# Patient Record
Sex: FEMALE | Race: WHITE | Employment: OTHER | URBAN - METROPOLITAN AREA
[De-identification: names, ages, dates, MRNs, and addresses within clinical notes are randomized per-mention and may not be internally consistent; named-entity substitution may affect disease eponyms.]

---

## 2017-05-17 ENCOUNTER — APPOINTMENT (OUTPATIENT)
Dept: CT IMAGING | Facility: HOSPITAL | Age: 77
DRG: 444 | End: 2017-05-17
Attending: EMERGENCY MEDICINE
Payer: MEDICARE

## 2017-05-17 ENCOUNTER — HOSPITAL ENCOUNTER (INPATIENT)
Facility: HOSPITAL | Age: 77
LOS: 4 days | Discharge: HOME | DRG: 444 | End: 2017-05-22
Attending: EMERGENCY MEDICINE | Admitting: INTERNAL MEDICINE
Payer: MEDICARE

## 2017-05-17 ENCOUNTER — APPOINTMENT (OUTPATIENT)
Dept: ULTRASOUND IMAGING | Facility: HOSPITAL | Age: 77
DRG: 444 | End: 2017-05-17
Attending: EMERGENCY MEDICINE
Payer: MEDICARE

## 2017-05-17 DIAGNOSIS — K80.50 CHOLEDOCHOLITHIASIS: ICD-10-CM

## 2017-05-17 DIAGNOSIS — K85.10 ACUTE BILIARY PANCREATITIS, UNSPECIFIED COMPLICATION STATUS: Primary | ICD-10-CM

## 2017-05-17 PROCEDURE — 80048 BASIC METABOLIC PNL TOTAL CA: CPT | Performed by: EMERGENCY MEDICINE

## 2017-05-17 PROCEDURE — 80076 HEPATIC FUNCTION PANEL: CPT | Performed by: EMERGENCY MEDICINE

## 2017-05-17 PROCEDURE — 99285 EMERGENCY DEPT VISIT HI MDM: CPT

## 2017-05-17 PROCEDURE — 93005 ELECTROCARDIOGRAM TRACING: CPT

## 2017-05-17 PROCEDURE — 96376 TX/PRO/DX INJ SAME DRUG ADON: CPT

## 2017-05-17 PROCEDURE — 96365 THER/PROPH/DIAG IV INF INIT: CPT

## 2017-05-17 PROCEDURE — 86850 RBC ANTIBODY SCREEN: CPT | Performed by: EMERGENCY MEDICINE

## 2017-05-17 PROCEDURE — 83690 ASSAY OF LIPASE: CPT | Performed by: EMERGENCY MEDICINE

## 2017-05-17 PROCEDURE — 86900 BLOOD TYPING SEROLOGIC ABO: CPT | Performed by: EMERGENCY MEDICINE

## 2017-05-17 PROCEDURE — 74177 CT ABD & PELVIS W/CONTRAST: CPT | Performed by: EMERGENCY MEDICINE

## 2017-05-17 PROCEDURE — 86901 BLOOD TYPING SEROLOGIC RH(D): CPT | Performed by: EMERGENCY MEDICINE

## 2017-05-17 PROCEDURE — 96375 TX/PRO/DX INJ NEW DRUG ADDON: CPT

## 2017-05-17 PROCEDURE — 93010 ELECTROCARDIOGRAM REPORT: CPT | Performed by: EMERGENCY MEDICINE

## 2017-05-17 PROCEDURE — 76705 ECHO EXAM OF ABDOMEN: CPT | Performed by: EMERGENCY MEDICINE

## 2017-05-17 PROCEDURE — 85025 COMPLETE CBC W/AUTO DIFF WBC: CPT | Performed by: EMERGENCY MEDICINE

## 2017-05-17 PROCEDURE — 96361 HYDRATE IV INFUSION ADD-ON: CPT

## 2017-05-17 RX ORDER — SODIUM CHLORIDE, SODIUM LACTATE, POTASSIUM CHLORIDE, CALCIUM CHLORIDE 600; 310; 30; 20 MG/100ML; MG/100ML; MG/100ML; MG/100ML
INJECTION, SOLUTION INTRAVENOUS ONCE
Status: COMPLETED | OUTPATIENT
Start: 2017-05-17 | End: 2017-05-18

## 2017-05-17 RX ORDER — ONDANSETRON 2 MG/ML
4 INJECTION INTRAMUSCULAR; INTRAVENOUS ONCE
Status: COMPLETED | OUTPATIENT
Start: 2017-05-17 | End: 2017-05-17

## 2017-05-17 RX ORDER — HYDROMORPHONE HYDROCHLORIDE 1 MG/ML
0.5 INJECTION, SOLUTION INTRAMUSCULAR; INTRAVENOUS; SUBCUTANEOUS ONCE
Status: COMPLETED | OUTPATIENT
Start: 2017-05-17 | End: 2017-05-17

## 2017-05-17 RX ORDER — HYDROMORPHONE HYDROCHLORIDE 1 MG/ML
0.5 INJECTION, SOLUTION INTRAMUSCULAR; INTRAVENOUS; SUBCUTANEOUS EVERY 30 MIN PRN
Status: DISCONTINUED | OUTPATIENT
Start: 2017-05-17 | End: 2017-05-18

## 2017-05-17 RX ORDER — MORPHINE SULFATE 4 MG/ML
4 INJECTION, SOLUTION INTRAMUSCULAR; INTRAVENOUS ONCE
Status: COMPLETED | OUTPATIENT
Start: 2017-05-17 | End: 2017-05-17

## 2017-05-18 PROBLEM — K85.10 ACUTE BILIARY PANCREATITIS, UNSPECIFIED COMPLICATION STATUS: Status: ACTIVE | Noted: 2017-05-18

## 2017-05-18 PROBLEM — K85.10 ACUTE BILIARY PANCREATITIS, UNSPECIFIED COMPLICATION STATUS (HCC): Status: ACTIVE | Noted: 2017-05-18

## 2017-05-18 PROCEDURE — 82150 ASSAY OF AMYLASE: CPT | Performed by: INTERNAL MEDICINE

## 2017-05-18 PROCEDURE — C9113 INJ PANTOPRAZOLE SODIUM, VIA: HCPCS | Performed by: INTERNAL MEDICINE

## 2017-05-18 PROCEDURE — 85025 COMPLETE CBC W/AUTO DIFF WBC: CPT | Performed by: INTERNAL MEDICINE

## 2017-05-18 PROCEDURE — 80053 COMPREHEN METABOLIC PANEL: CPT | Performed by: INTERNAL MEDICINE

## 2017-05-18 PROCEDURE — 83690 ASSAY OF LIPASE: CPT | Performed by: INTERNAL MEDICINE

## 2017-05-18 RX ORDER — CLONAZEPAM 2 MG/1
1.5 TABLET ORAL NIGHTLY
COMMUNITY
End: 2018-08-07

## 2017-05-18 RX ORDER — ROSUVASTATIN CALCIUM 10 MG/1
10 TABLET, COATED ORAL DAILY
COMMUNITY
End: 2018-08-01

## 2017-05-18 RX ORDER — ONDANSETRON 2 MG/ML
4 INJECTION INTRAMUSCULAR; INTRAVENOUS ONCE
Status: COMPLETED | OUTPATIENT
Start: 2017-05-18 | End: 2017-05-18

## 2017-05-18 RX ORDER — HYDROMORPHONE HYDROCHLORIDE 1 MG/ML
0.5 INJECTION, SOLUTION INTRAMUSCULAR; INTRAVENOUS; SUBCUTANEOUS EVERY 6 HOURS PRN
Status: DISCONTINUED | OUTPATIENT
Start: 2017-05-18 | End: 2017-05-20

## 2017-05-18 RX ORDER — MULTIVIT-MIN/IRON/FOLIC ACID/K 18-600-40
2000 CAPSULE ORAL
COMMUNITY
End: 2017-06-27

## 2017-05-18 RX ORDER — DEXTROSE, SODIUM CHLORIDE, AND POTASSIUM CHLORIDE 5; .45; .15 G/100ML; G/100ML; G/100ML
INJECTION INTRAVENOUS CONTINUOUS
Status: DISCONTINUED | OUTPATIENT
Start: 2017-05-18 | End: 2017-05-20

## 2017-05-18 RX ORDER — HYDROMORPHONE HYDROCHLORIDE 1 MG/ML
0.5 INJECTION, SOLUTION INTRAMUSCULAR; INTRAVENOUS; SUBCUTANEOUS EVERY 4 HOURS PRN
Status: DISCONTINUED | OUTPATIENT
Start: 2017-05-18 | End: 2017-05-22

## 2017-05-18 RX ORDER — 0.9 % SODIUM CHLORIDE 0.9 %
VIAL (ML) INJECTION
Status: COMPLETED
Start: 2017-05-18 | End: 2017-05-18

## 2017-05-18 RX ORDER — HYDROMORPHONE HYDROCHLORIDE 1 MG/ML
1 INJECTION, SOLUTION INTRAMUSCULAR; INTRAVENOUS; SUBCUTANEOUS EVERY 4 HOURS PRN
Status: DISCONTINUED | OUTPATIENT
Start: 2017-05-18 | End: 2017-05-22

## 2017-05-18 RX ORDER — ONDANSETRON 2 MG/ML
4 INJECTION INTRAMUSCULAR; INTRAVENOUS EVERY 6 HOURS PRN
Status: DISCONTINUED | OUTPATIENT
Start: 2017-05-18 | End: 2017-05-22

## 2017-05-18 NOTE — CONSULTS
[facility]    Report of Consultation    Sona Ash Patient Status:  Inpatient    1940 MRN [id number]   Location [facility] 5SW/SE Attending Austin Mckeon, *   Hosp Day # 1 PCP Judson Webster MD     Date of negative  Genitourinary: negative  Musculoskeletal: negative  Neurological: negative  Behavioral/Psych: negative    Physical Exam:   Blood pressure 107/49, pulse 91, temperature 98.1 °F (36.7 °C), temperature source Oral, resp.  rate 18, height 5' 3\" (1.6 there is agreement without major discrepancies. Ct Abdomen Pelvis Iv Contrast, No Oral (er)    5/18/2017  CONCLUSION:  1. Acute pancreatitis. 2. Mild intra-and extrahepatic biliary ductal dilatation without evidence of choledocholithiasis.  3. 1.3 c

## 2017-05-18 NOTE — PROGRESS NOTES
05/18/17 1318   Clinical Encounter Type   Visited With Patient   Routine Visit Introduction   Continue Visiting Yes   Referral From Nurse   Referral To [address] Needs Prayer   Patient Spiritual Encounters   Spiritual A

## 2017-05-18 NOTE — PLAN OF CARE
Problem: Patient/Family Goals  Goal: Patient/Family Long Term Goal  Patient’s Long Term Goal: Go home     Interventions:  - medical intervention  - monitor labs  - medicate for pain and nausea  - advance diet when medically stable   - hydrate  - administer hydration with IV or PO as ordered and tolerated  - Evaluate effectiveness of GI medications  - Encourage mobilization and activity  - Obtain nutritional consult as needed  - Establish a toileting routine/schedule  - Consider collaborating with pharmacy to

## 2017-05-18 NOTE — CM/SW NOTE
Spoke with patient at bedside. Patient states she is home alone and her  is at [facility] for rehab.  Patient very concerned regarding the rehab facility will be discharging the patient  tomorrow and no one will be home because she

## 2017-05-18 NOTE — ED NOTES
Left upper arm and left shoulder healing bruise noted. Pt denies fall or trauma to arm. Pt states she has shown this to her PCP. Pt vomited approximately 300 cc emesis. No visual blood noted in emesis.

## 2017-05-18 NOTE — ED NOTES
Report given to Counts include 234 beds at the [facility], awaiting for room to be clean, pt and family updated.

## 2017-05-18 NOTE — ED NOTES
Pt to ER with c/o sudden onset upper mid abdominal pain. Pt states she also vomited x1 when pain started. Pt noticed mucous with streaks of blood and small blood clot in emesis. Pt vomited one other time on the way to ER. Pt c/o pain to abdomen 8/10.  Pt pa

## 2017-05-18 NOTE — ED INITIAL ASSESSMENT (HPI)
Patient c/o 1 episode vomiting blood around 6 pm----patient stated it was a large clot with mucous. C/o generalized abdominal cramping    Patient takes ASA PRN for pain, denies any other anticoagulants.  Denies rectal bleeding    Appears pale, weak

## 2017-05-18 NOTE — ED PROVIDER NOTES
Patient Seen in: [facility] AND [facility] Emergency Department    History   Patient presents with:  Hemoptysis (respiratory)    Stated Complaint: vomitting blood     HPI    26-year-old female presents for sudden onset epigastric abdominal pain with nonbilious b (5' 3\")  Wt 54.885 kg  BMI 21.44 kg/m2  SpO2 100%        Physical Exam   Constitutional: She is oriented to person, place, and time. She appears well-developed and well-nourished. HENT:   Head: Normocephalic and atraumatic.    Eyes: EOM are normal. Pupil -----------         ------                     CBC W/ DIFFERENTIAL[[id number]]          Abnormal            Final result                 Please view results for these tests on the individual orders.    URINALYSIS WITH CULTURE REFLEX   TYPE AND SCRE minimal wall prominence; small amount of pericholecystic fluid; few anechoic liver lesions are likely cysts      Case discussed with  Dr. Harpreet Anthony @ 12:45 am ET      CT ABDOMEN PELVIS WITH CONTRAST      IMPRESSION:  Extensive peripancreatic fluid and stra

## 2017-05-19 ENCOUNTER — APPOINTMENT (OUTPATIENT)
Dept: GENERAL RADIOLOGY | Facility: HOSPITAL | Age: 77
DRG: 444 | End: 2017-05-19
Attending: INTERNAL MEDICINE
Payer: MEDICARE

## 2017-05-19 PROCEDURE — 71020 XR CHEST PA + LAT CHEST (CPT=71020): CPT | Performed by: INTERNAL MEDICINE

## 2017-05-19 PROCEDURE — C9113 INJ PANTOPRAZOLE SODIUM, VIA: HCPCS | Performed by: INTERNAL MEDICINE

## 2017-05-19 PROCEDURE — 73030 X-RAY EXAM OF SHOULDER: CPT | Performed by: INTERNAL MEDICINE

## 2017-05-19 NOTE — PROGRESS NOTES
[facility]    Progress Note    Sona Padilla Patient Status:  Inpatient    1940 MRN [id number]   Location [facility] 5SW/SE Attending Trae Guerrero, [address] Day # 2 PCP Amber Angel MD       Subjective: NEED CHOLECYSTECTOMY AT SOME PT. PT SAYS SHE IS NOT READY FOR CHOLECYSTECTOMY AT PRESENT DUE TO HER 'S CONDITION.   DW PT IN DETAILS      Results:     Lab Results  Component Value Date   WBC 12.3* 05/18/2017   HGB 10.5* 05/18/2017   HCT 31.6* 05/18/2

## 2017-05-19 NOTE — PLAN OF CARE
Problem: Patient/Family Goals  Goal: Patient/Family Long Term Goal  Patient’s Long Term Goal: Go home     Interventions:  - medical intervention  - monitor labs  - medicate for pain and nausea  - advance diet when medically stable   - hydrate  - administer antiemetic medications  - Provide nonpharmacologic comfort measures as appropriate  - Advance diet as tolerated, if ordered  - Obtain nutritional consult as needed  - Evaluate fluid balance   Outcome: Progressing  Continue IV hydration.  patient to be on cl care of him     Problem: SAFETY ADULT - FALL  Goal: Free from fall injury  INTERVENTIONS:  - Assess pt frequently for physical needs  - Identify cognitive and physical deficits and behaviors that affect risk of falls.   - [place] fall precautions as indic

## 2017-05-19 NOTE — H&P
[facility]    PATIENT'S NAME: Red Womacktan   ATTENDING PHYSICIAN: León Cho MD   PATIENT ACCOUNT#:   [id number]    LOCATION:  [address] [place] [address] RECORD #:   [id number]       YOB: 1940  ADMISSION DATE:       05/ with acute abdominal pain from pancreatitis. The patient has choledocholithiasis. She is on IV fluids. She had mild intrahepatic and extrahepatic biliary duct dilatation, a 1.3 cm calculus in the right renal pelvis.   Actually, the CT scan revealed no cho

## 2017-05-20 ENCOUNTER — ANESTHESIA (OUTPATIENT)
Dept: ENDOSCOPY | Facility: HOSPITAL | Age: 77
DRG: 444 | End: 2017-05-20
Payer: MEDICARE

## 2017-05-20 ENCOUNTER — APPOINTMENT (OUTPATIENT)
Dept: GENERAL RADIOLOGY | Facility: HOSPITAL | Age: 77
DRG: 444 | End: 2017-05-20
Attending: INTERNAL MEDICINE
Payer: MEDICARE

## 2017-05-20 ENCOUNTER — ANESTHESIA EVENT (OUTPATIENT)
Dept: ENDOSCOPY | Facility: HOSPITAL | Age: 77
DRG: 444 | End: 2017-05-20
Payer: MEDICARE

## 2017-05-20 ENCOUNTER — SURGERY (OUTPATIENT)
Age: 77
End: 2017-05-20

## 2017-05-20 PROCEDURE — C9113 INJ PANTOPRAZOLE SODIUM, VIA: HCPCS | Performed by: INTERNAL MEDICINE

## 2017-05-20 PROCEDURE — 0FC98ZZ EXTIRPATION OF MATTER FROM COMMON BILE DUCT, VIA NATURAL OR ARTIFICIAL OPENING ENDOSCOPIC: ICD-10-PCS | Performed by: INTERNAL MEDICINE

## 2017-05-20 PROCEDURE — 97165 OT EVAL LOW COMPLEX 30 MIN: CPT

## 2017-05-20 PROCEDURE — 97116 GAIT TRAINING THERAPY: CPT

## 2017-05-20 PROCEDURE — BF131ZZ FLUOROSCOPY OF GALLBLADDER AND BILE DUCTS USING LOW OSMOLAR CONTRAST: ICD-10-PCS | Performed by: INTERNAL MEDICINE

## 2017-05-20 PROCEDURE — 0BH17EZ INSERTION OF ENDOTRACHEAL AIRWAY INTO TRACHEA, VIA NATURAL OR ARTIFICIAL OPENING: ICD-10-PCS | Performed by: INTERNAL MEDICINE

## 2017-05-20 PROCEDURE — 97162 PT EVAL MOD COMPLEX 30 MIN: CPT

## 2017-05-20 PROCEDURE — 74330 X-RAY BILE/PANC ENDOSCOPY: CPT | Performed by: INTERNAL MEDICINE

## 2017-05-20 PROCEDURE — 0F794DZ DILATION OF COMMON BILE DUCT WITH INTRALUMINAL DEVICE, PERCUTANEOUS ENDOSCOPIC APPROACH: ICD-10-PCS | Performed by: INTERNAL MEDICINE

## 2017-05-20 DEVICE — BILIARY STENT WITH NAVIFLEXTM RX DELIVERY SYSTEM
Type: IMPLANTABLE DEVICE | Status: FUNCTIONAL
Brand: ADVANIX™ BILIARY

## 2017-05-20 RX ORDER — SODIUM CHLORIDE, SODIUM LACTATE, POTASSIUM CHLORIDE, CALCIUM CHLORIDE 600; 310; 30; 20 MG/100ML; MG/100ML; MG/100ML; MG/100ML
INJECTION, SOLUTION INTRAVENOUS CONTINUOUS PRN
Status: DISCONTINUED | OUTPATIENT
Start: 2017-05-20 | End: 2017-05-20 | Stop reason: SURG

## 2017-05-20 RX ORDER — 0.9 % SODIUM CHLORIDE 0.9 %
VIAL (ML) INJECTION
Status: COMPLETED
Start: 2017-05-20 | End: 2017-05-20

## 2017-05-20 RX ORDER — SODIUM CHLORIDE, SODIUM LACTATE, POTASSIUM CHLORIDE, CALCIUM CHLORIDE 600; 310; 30; 20 MG/100ML; MG/100ML; MG/100ML; MG/100ML
INJECTION, SOLUTION INTRAVENOUS CONTINUOUS
Status: DISCONTINUED | OUTPATIENT
Start: 2017-05-20 | End: 2017-05-22

## 2017-05-20 RX ORDER — ROCURONIUM BROMIDE 10 MG/ML
INJECTION, SOLUTION INTRAVENOUS AS NEEDED
Status: DISCONTINUED | OUTPATIENT
Start: 2017-05-20 | End: 2017-05-20 | Stop reason: SURG

## 2017-05-20 RX ORDER — ONDANSETRON 2 MG/ML
4 INJECTION INTRAMUSCULAR; INTRAVENOUS ONCE AS NEEDED
Status: DISCONTINUED | OUTPATIENT
Start: 2017-05-20 | End: 2017-05-20 | Stop reason: HOSPADM

## 2017-05-20 RX ORDER — NEOSTIGMINE METHYLSULFATE 0.5 MG/ML
INJECTION INTRAVENOUS AS NEEDED
Status: DISCONTINUED | OUTPATIENT
Start: 2017-05-20 | End: 2017-05-20 | Stop reason: SURG

## 2017-05-20 RX ORDER — GLYCOPYRROLATE 0.2 MG/ML
INJECTION INTRAMUSCULAR; INTRAVENOUS AS NEEDED
Status: DISCONTINUED | OUTPATIENT
Start: 2017-05-20 | End: 2017-05-20 | Stop reason: SURG

## 2017-05-20 RX ADMIN — GLYCOPYRROLATE 0.2 MG: 0.2 INJECTION INTRAMUSCULAR; INTRAVENOUS at 17:38:00

## 2017-05-20 RX ADMIN — SODIUM CHLORIDE, SODIUM LACTATE, POTASSIUM CHLORIDE, CALCIUM CHLORIDE: 600; 310; 30; 20 INJECTION, SOLUTION INTRAVENOUS at 17:16:00

## 2017-05-20 RX ADMIN — SODIUM CHLORIDE, SODIUM LACTATE, POTASSIUM CHLORIDE, CALCIUM CHLORIDE: 600; 310; 30; 20 INJECTION, SOLUTION INTRAVENOUS at 17:19:00

## 2017-05-20 RX ADMIN — NEOSTIGMINE METHYLSULFATE 1 MG: 0.5 INJECTION INTRAVENOUS at 17:38:00

## 2017-05-20 RX ADMIN — SODIUM CHLORIDE, SODIUM LACTATE, POTASSIUM CHLORIDE, CALCIUM CHLORIDE: 600; 310; 30; 20 INJECTION, SOLUTION INTRAVENOUS at 16:55:00

## 2017-05-20 RX ADMIN — SODIUM CHLORIDE, SODIUM LACTATE, POTASSIUM CHLORIDE, CALCIUM CHLORIDE: 600; 310; 30; 20 INJECTION, SOLUTION INTRAVENOUS at 17:57:00

## 2017-05-20 RX ADMIN — SODIUM CHLORIDE, SODIUM LACTATE, POTASSIUM CHLORIDE, CALCIUM CHLORIDE: 600; 310; 30; 20 INJECTION, SOLUTION INTRAVENOUS at 17:32:00

## 2017-05-20 RX ADMIN — ROCURONIUM BROMIDE 30 MG: 10 INJECTION, SOLUTION INTRAVENOUS at 17:04:00

## 2017-05-20 NOTE — PROGRESS NOTES
05/19/17 2006   Clinical Encounter Type   Visited With Patient   Routine Visit Follow-up   Continue Visiting Yes   Surgical Visit Pre-op   Patient Spiritual Encounters   Spiritual Assessment Completed 1   Spiritual Needs Empathic listening and calming p

## 2017-05-20 NOTE — OPERATIVE REPORT
ERCP:    PERIAMPULLARY DIVERTICULUM  NORMAL PD  DILATED CBD  ES DONE  MULTIPLE STONES REMOVED WITH SLUDGE  10 FR 7 CM STENT INSERTED    PLAN:    PT WANTS TO HOLD OFF ALIA. FOR NOW. CLEAR LIQUID DIET  ADVANCE AS TOLERATED.   WILL REMOVE STENT AFTER ALIA

## 2017-05-20 NOTE — PLAN OF CARE
Problem: Patient/Family Goals  Goal: Patient/Family Short Term Goal  Patient’s Short Term Goal: Alleviate pain and nausea    Interventions:   - Administer pain medications and antiemetics as prescribe  - keep NPO as ordered  - Evaluate effectiveness of med decision-making at the level they choose  - Honor patient and family perspectives and choices   Outcome: Progressing    Problem: SAFETY ADULT - FALL  Goal: Free from fall injury  INTERVENTIONS:  - Assess pt frequently for physical needs  - Identify cogniti

## 2017-05-20 NOTE — ANESTHESIA PREPROCEDURE EVALUATION
Anesthesia PreOp Note    HPI:     Sona Mora is a 64year old female who presents for preoperative consultation requested by: Tonio Moya MD    Date of Surgery: 5/17/2017 - 5/20/2017    Procedure(s):  ENDOSCOPIC RETROGRADE CHOLANGIOPANCREATOGRAPHY Q4H PRN Chris Gabriel MD 1 mg at 05/20/17 0706   ClonazePAM (KLONOPIN) tab 1.5 mg 1.5 mg Oral Nightly Benny Flores MD 1.5 mg at 05/19/17 2118     No current Epic-ordered outpatient prescriptions on file.       Morphine                    Histo anesthetic complications   Airway   Mallampati: III  TM distance: >3 FB  Neck ROM: limited  Dental      Pulmonary     breath sounds clear to auscultation  (-) asthma  Cardiovascular   (-) hypertension, past MI, CAD, CABG/stent, angina    Rhythm: regular  R

## 2017-05-20 NOTE — OCCUPATIONAL THERAPY NOTE
OCCUPATIONAL THERAPY EVALUATION - INPATIENT     Room Number: 551/551-A  Evaluation Date: 5/20/2017  Type of Evaluation: Initial  Presenting Problem: pancreatitis    Physician Order: IP Consult to Occupational Therapy  Reason for Therapy: ADL/IADL Dysfuncti independent with ADLs and ambulation s/ AD. Pt's son (who is on disability) lives in the apartment attached to their home.  is currently in rehab. Pt works part time at [employer]. She drives.      SUBJECTIVE  \"I need to be okay because my  is serna ASSESSMENT  Grooming: supervision  Bathing: supervision  Toileting: supervision  Upper Extremity Dressing: supervision  Lower Extremity Dressing:  Mod I      Education Provided: role of OT   Patient End of Session: Up in chair;Needs met;Call light within re

## 2017-05-20 NOTE — PROGRESS NOTES
[facility]    Progress Note    Sona LANDIN Seb Burgos Patient Status:  Inpatient    1940 MRN [id number]   Location [facility] 5SW/SE Attending Marty Aguirre, [address] Day # 2 PCP Addi Sifuentes MD     Subjective: 05/18/2017   LIP 1945* 05/18/2017       Xr Chest Pa + Lat Chest (cpt=71020)    5/19/2017  CONCLUSION:  1. Bibasilar pleural effusions, a new finding. 2.    Associated bibasilar interstitial infiltrates/subsegmental atelectasis.         Xr Shoulder, Complete

## 2017-05-20 NOTE — PHYSICAL THERAPY NOTE
PHYSICAL THERAPY EVALUATION - INPATIENT     Room Number: 551/551-A  Evaluation Date: 5/20/2017  Type of Evaluation: Initial  Physician Order: PT Eval and Treat    Presenting Problem: Pancreatitis  Reason for Therapy: Mobility Dysfunction and Discharge Level of [place]: Fully independent; pt works part time at [employer]    SUBJECTIVE  I need to get better soon    PHYSICAL THERAPY EXAMINATION     OBJECTIVE  Precautions: Bed/chair alarm  Fall Risk: Standard fall risk    WEIGHT BEARING RESTRICTION  Weight Assessment   Gait Assistance: Supervision  Distance (ft): 400  Assistive Device: Other (Comment) (IV pole)  Pattern: Shuffle  Stoop/Curb Assistance: Not tested       Bed Mobility: SBA    Transfers: SBA    Exercise/Education Provided:  Bed mobility  Gait tr

## 2017-05-20 NOTE — ANESTHESIA POSTPROCEDURE EVALUATION
Patient: June E Nelsy Ali    Procedure Summary     Date Anesthesia Start Anesthesia Stop Room / Location    05/20/17 2436  [facility] ENDOSCOPY 01 / [facility] ENDOSCOPY       Procedure Diagnosis Surgeon Responsible Provider    ENDOSCOPIC RETROGRADE CHOLANGIOPANCREATOGRAP

## 2017-05-20 NOTE — PROGRESS NOTES
Patient returned from ERCP in stable condition. Alert and denying any pain or nausea at this time, IV fluids running 125hr, dinner tray has been ordered for clear diet. /74, HR 71, Temp 97.6, O2 sat 96% on RA.

## 2017-05-21 PROCEDURE — C9113 INJ PANTOPRAZOLE SODIUM, VIA: HCPCS | Performed by: INTERNAL MEDICINE

## 2017-05-21 PROCEDURE — 83690 ASSAY OF LIPASE: CPT | Performed by: INTERNAL MEDICINE

## 2017-05-21 PROCEDURE — 80053 COMPREHEN METABOLIC PANEL: CPT | Performed by: INTERNAL MEDICINE

## 2017-05-21 PROCEDURE — 85025 COMPLETE CBC W/AUTO DIFF WBC: CPT | Performed by: INTERNAL MEDICINE

## 2017-05-21 RX ORDER — 0.9 % SODIUM CHLORIDE 0.9 %
VIAL (ML) INJECTION
Status: COMPLETED
Start: 2017-05-21 | End: 2017-05-21

## 2017-05-21 NOTE — OPERATIVE REPORT
[facility]    PATIENT'S NAME: BERNIE LOMELI   ATTENDING PHYSICIAN: Samir Navas MD   OPERATING PHYSICIAN: Santi Coronado MD   PATIENT ACCOUNT#:   [id number]    LOCATION:  [address] [place] [address],[address] #:   [id number]       DATE OF BI tolerated the procedure well. There were no preoperative, intraoperative, or postoperative complications. PLAN:    1. Clear liquid diet. Advance tomorrow if no pain. 2.   Followup labs.   3.   The patient wants to hold off on laparoscopic cholecys

## 2017-05-21 NOTE — PROGRESS NOTES
[facility]    Progress Note    Sona Royjose franciscomando Ramirofrank Patient Status:  Inpatient    1940 MRN [id number]   Location [facility] 5SW/SE Attending Renato Lane, *   Hosp Day # 4 PCP Сергей Bhatti MD     Subjective: 05/21/2017   AST 29 05/21/2017   ALT 59* 05/21/2017   EMELYN 1992* 05/18/2017   LIP 89* 05/21/2017       Xr Chest Pa + Lat Chest (cpt=71020)    5/19/2017  CONCLUSION:  1. Bibasilar pleural effusions, a new finding.  2.    Associated bibasilar interstitial infi

## 2017-05-21 NOTE — PROGRESS NOTES
[facility]    Progress Note    Sona LANDIN Jignesh Chaitanya Patient Status:  Inpatient    1940 MRN [id number]   Location [facility] 5SW/SE Attending Warden Orourke, *   Hosp Day # 4 PCP Karlee Robb MD       Subjective: 05/21/2017    05/21/2017   CO2 25 05/21/2017   GLU 99 05/21/2017   CA 9.2 05/21/2017   ALB 2.9* 05/21/2017   ALKPHO 75 05/21/2017   BILT 0.8 05/21/2017   TP 6.2 05/21/2017   AST 29 05/21/2017   ALT 59* 05/21/2017   EMELYN 1992* 05/18/2017   LIP 89* 05/2

## 2017-05-21 NOTE — PROGRESS NOTES
[place] [facility]    Progress Note    Sona MUSHTAQ Liban Mcdonaldkunal Patient Status:  Inpatient    1940 MRN [id number]   Location [address] UNIT Attending Elyssa Lozano [facility] Day # 3 PCP Miley Lorenzana MD 05/18/2017    05/18/2017   CO2 25 05/18/2017   * 05/18/2017   CA 8.7 05/18/2017   ALB 3.3* 05/18/2017   ALKPHO 77 05/18/2017   BILT 0.6 05/18/2017   TP 5.9 05/18/2017   * 05/18/2017   * 05/18/2017   EMELYN 1992* 05/18/2017

## 2017-05-21 NOTE — PLAN OF CARE
Problem: Patient/Family Goals  Goal: Patient/Family Short Term Goal  Patient’s Short Term Goal: Alleviate pain and nausea    Interventions:   - Administer pain medications and antiemetics as prescribe  - keep NPO as ordered  - Evaluate effectiveness of med Centered Care  Goal: Patient preferences are identified and integrated in the patient’s plan of care  Interventions:  - What would you like us to know as we care for you?   - Provide timely, complete, and accurate information to patient/family  - Branden Quach

## 2017-05-22 VITALS
SYSTOLIC BLOOD PRESSURE: 145 MMHG | HEART RATE: 76 BPM | RESPIRATION RATE: 18 BRPM | WEIGHT: 121 LBS | DIASTOLIC BLOOD PRESSURE: 68 MMHG | OXYGEN SATURATION: 97 % | TEMPERATURE: 98 F | HEIGHT: 63 IN | BODY MASS INDEX: 21.44 KG/M2

## 2017-05-22 PROCEDURE — C9113 INJ PANTOPRAZOLE SODIUM, VIA: HCPCS | Performed by: INTERNAL MEDICINE

## 2017-05-22 PROCEDURE — 97110 THERAPEUTIC EXERCISES: CPT

## 2017-05-22 PROCEDURE — 97530 THERAPEUTIC ACTIVITIES: CPT

## 2017-05-22 PROCEDURE — 97116 GAIT TRAINING THERAPY: CPT

## 2017-05-22 NOTE — PLAN OF CARE
Problem: Patient/Family Goals  Goal: Patient/Family Long Term Goal  Patient’s Long Term Goal: Go home     Interventions:  - medical intervention  - monitor labs  - medicate for pain and nausea  - advance diet when medically stable   - hydrate  - administer Progressing  Goal: Maintains or returns to baseline bowel function  INTERVENTIONS:  - Assess bowel function  - Maintain adequate hydration with IV or PO as ordered and tolerated  - Evaluate effectiveness of GI medications  - Encourage mobilization and acti

## 2017-05-22 NOTE — PLAN OF CARE
Problem: Patient/Family Goals  Goal: Patient/Family Long Term Goal  Patient’s Long Term Goal: Go home     Interventions:  - medical intervention  - monitor labs  - medicate for pain and nausea  - advance diet when medically stable   - hydrate  - administer tolerated, if ordered  - Obtain nutritional consult as needed  - Evaluate fluid balance   Outcome: Progressing  Patient denies nausea  Goal: Maintains or returns to baseline bowel function  INTERVENTIONS:  - Assess bowel function  - Maintain adequate hydra cognitive and physical deficits and behaviors that affect risk of falls.   - [place] fall precautions as indicated by assessment.  - Educate pt/family on patient safety including physical limitations  - Instruct pt to call for assistance with activity bas

## 2017-05-22 NOTE — PHYSICAL THERAPY NOTE
PHYSICAL THERAPY TREATMENT NOTE - INPATIENT    Room Number: 551/551-A       Presenting Problem: Pancreatitis    Problem List  Principal Problem:    Choledocholithiasis  Active Problems:    Acute biliary pancreatitis, unspecified complication status      A PT Approx Degree of Impairment Score: 20.91%   Standardized Score (AM-PAC Scale): 53.28   CMS Modifier (G-Code): CJ    FUNCTIONAL ABILITY STATUS  Gait Assessment   Gait Assistance: Supervision  Distance (ft): 2 x 200  Assistive Device: Other (Comment); No

## 2017-05-24 NOTE — DISCHARGE SUMMARY
[facility]    PATIENT'S NAME: Kelly Ellen   ATTENDING PHYSICIAN: Samir Navas MD   PATIENT ACCOUNT#:   [id number]    LOCATION:  [address] [place] [address] RECORD #:   [id number]       YOB: 1940  ADMISSION DATE:       05/

## 2017-05-31 ENCOUNTER — LAB ENCOUNTER (OUTPATIENT)
Dept: LAB | Facility: HOSPITAL | Age: 77
End: 2017-05-31
Attending: INTERNAL MEDICINE
Payer: MEDICARE

## 2017-05-31 DIAGNOSIS — K80.00 CALCULUS OF GALLBLADDER WITH ACUTE CHOLECYSTITIS: Primary | ICD-10-CM

## 2017-05-31 PROCEDURE — 80053 COMPREHEN METABOLIC PANEL: CPT

## 2017-05-31 PROCEDURE — 85025 COMPLETE CBC W/AUTO DIFF WBC: CPT

## 2017-05-31 PROCEDURE — 36415 COLL VENOUS BLD VENIPUNCTURE: CPT

## 2017-06-02 ENCOUNTER — HOSPITAL ENCOUNTER (OUTPATIENT)
Dept: MRI IMAGING | Age: 77
Discharge: HOME | End: 2017-06-02
Payer: MEDICARE

## 2017-06-02 DIAGNOSIS — M75.102 ROTATOR CUFF TEAR, LEFT: ICD-10-CM

## 2017-06-02 PROCEDURE — 73221 MRI JOINT UPR EXTREM W/O DYE: CPT | Performed by: ORTHOPAEDIC SURGERY

## 2017-06-02 PROCEDURE — 73221 MRI JOINT UPR EXTREM W/O DYE: CPT

## 2017-06-30 ENCOUNTER — HOSPITAL ENCOUNTER (INPATIENT)
Facility: HOSPITAL | Age: 77
LOS: 2 days | Discharge: HOME | DRG: 419 | End: 2017-07-02
Attending: SURGERY | Admitting: SURGERY
Payer: MEDICARE

## 2017-06-30 ENCOUNTER — ANESTHESIA EVENT (OUTPATIENT)
Dept: SURGERY | Facility: HOSPITAL | Age: 77
DRG: 419 | End: 2017-06-30
Payer: MEDICARE

## 2017-06-30 ENCOUNTER — ANESTHESIA (OUTPATIENT)
Dept: SURGERY | Facility: HOSPITAL | Age: 77
DRG: 419 | End: 2017-06-30
Payer: MEDICARE

## 2017-06-30 ENCOUNTER — SURGERY (OUTPATIENT)
Age: 77
End: 2017-06-30

## 2017-06-30 DIAGNOSIS — K80.10 CHRONIC CHOLECYSTITIS WITH CALCULUS: Primary | ICD-10-CM

## 2017-06-30 LAB
ALBUMIN SERPL BCP-MCNC: 3 G/DL (ref 3.5–4.8)
ALBUMIN/GLOB SERPL: 1 {RATIO} (ref 1–2)
ALP SERPL-CCNC: 64 U/L (ref 32–100)
ALT SERPL-CCNC: 20 U/L (ref 14–54)
ANION GAP SERPL CALC-SCNC: 7 MMOL/L (ref 0–18)
AST SERPL-CCNC: 34 U/L (ref 15–41)
BASOPHILS # BLD: 0 K/UL (ref 0–0.2)
BASOPHILS NFR BLD: 0 %
BILIRUB SERPL-MCNC: 0.3 MG/DL (ref 0.3–1.2)
BUN SERPL-MCNC: 19 MG/DL (ref 8–20)
BUN/CREAT SERPL: 25.7 (ref 10–20)
CALCIUM SERPL-MCNC: 8.8 MG/DL (ref 8.5–10.5)
CHLORIDE SERPL-SCNC: 108 MMOL/L (ref 95–110)
CO2 SERPL-SCNC: 22 MMOL/L (ref 22–32)
CREAT SERPL-MCNC: 0.74 MG/DL (ref 0.5–1.5)
EOSINOPHIL # BLD: 0 K/UL (ref 0–0.7)
EOSINOPHIL NFR BLD: 0 %
ERYTHROCYTE [DISTWIDTH] IN BLOOD BY AUTOMATED COUNT: 13.8 % (ref 11–15)
GLOBULIN PLAS-MCNC: 2.9 G/DL (ref 2.5–3.7)
GLUCOSE SERPL-MCNC: 153 MG/DL (ref 70–99)
HCT VFR BLD AUTO: 30.1 % (ref 35–48)
HGB BLD-MCNC: 10.2 G/DL (ref 12–16)
LYMPHOCYTES # BLD: 0.8 K/UL (ref 1–4)
LYMPHOCYTES NFR BLD: 10 %
MCH RBC QN AUTO: 29.2 PG (ref 27–32)
MCHC RBC AUTO-ENTMCNC: 33.9 G/DL (ref 32–37)
MCV RBC AUTO: 86.3 FL (ref 80–100)
MONOCYTES # BLD: 0.3 K/UL (ref 0–1)
MONOCYTES NFR BLD: 3 %
NEUTROPHILS # BLD AUTO: 6.7 K/UL (ref 1.8–7.7)
NEUTROPHILS NFR BLD: 86 %
OSMOLALITY UR CALC.SUM OF ELEC: 289 MOSM/KG (ref 275–295)
PLATELET # BLD AUTO: 178 K/UL (ref 140–400)
PMV BLD AUTO: 8.5 FL (ref 7.4–10.3)
POTASSIUM SERPL-SCNC: 4.5 MMOL/L (ref 3.3–5.1)
PROT SERPL-MCNC: 5.9 G/DL (ref 5.9–8.4)
RBC # BLD AUTO: 3.49 M/UL (ref 3.7–5.4)
SODIUM SERPL-SCNC: 137 MMOL/L (ref 136–144)
WBC # BLD AUTO: 7.8 K/UL (ref 4–11)

## 2017-06-30 PROCEDURE — 88304 TISSUE EXAM BY PATHOLOGIST: CPT | Performed by: SURGERY

## 2017-06-30 PROCEDURE — 80053 COMPREHEN METABOLIC PANEL: CPT | Performed by: INTERNAL MEDICINE

## 2017-06-30 PROCEDURE — 0FT44ZZ RESECTION OF GALLBLADDER, PERCUTANEOUS ENDOSCOPIC APPROACH: ICD-10-PCS | Performed by: SURGERY

## 2017-06-30 PROCEDURE — 85025 COMPLETE CBC W/AUTO DIFF WBC: CPT | Performed by: INTERNAL MEDICINE

## 2017-06-30 RX ORDER — ROCURONIUM BROMIDE 10 MG/ML
INJECTION, SOLUTION INTRAVENOUS AS NEEDED
Status: DISCONTINUED | OUTPATIENT
Start: 2017-06-30 | End: 2017-06-30 | Stop reason: SURG

## 2017-06-30 RX ORDER — EPHEDRINE SULFATE 50 MG/ML
INJECTION, SOLUTION INTRAVENOUS AS NEEDED
Status: DISCONTINUED | OUTPATIENT
Start: 2017-06-30 | End: 2017-06-30 | Stop reason: SURG

## 2017-06-30 RX ORDER — HYDROCODONE BITARTRATE AND ACETAMINOPHEN 7.5; 325 MG/1; MG/1
2 TABLET ORAL EVERY 4 HOURS PRN
Status: DISCONTINUED | OUTPATIENT
Start: 2017-06-30 | End: 2017-07-02

## 2017-06-30 RX ORDER — DEXAMETHASONE SODIUM PHOSPHATE 4 MG/ML
VIAL (ML) INJECTION AS NEEDED
Status: DISCONTINUED | OUTPATIENT
Start: 2017-06-30 | End: 2017-06-30 | Stop reason: SURG

## 2017-06-30 RX ORDER — SODIUM CHLORIDE, SODIUM LACTATE, POTASSIUM CHLORIDE, CALCIUM CHLORIDE 600; 310; 30; 20 MG/100ML; MG/100ML; MG/100ML; MG/100ML
INJECTION, SOLUTION INTRAVENOUS CONTINUOUS
Status: DISCONTINUED | OUTPATIENT
Start: 2017-06-30 | End: 2017-06-30

## 2017-06-30 RX ORDER — ACETAMINOPHEN 325 MG/1
650 TABLET ORAL EVERY 4 HOURS PRN
Status: DISCONTINUED | OUTPATIENT
Start: 2017-06-30 | End: 2017-07-02

## 2017-06-30 RX ORDER — HYDROCODONE BITARTRATE AND ACETAMINOPHEN 5; 325 MG/1; MG/1
2 TABLET ORAL AS NEEDED
Status: DISCONTINUED | OUTPATIENT
Start: 2017-06-30 | End: 2017-06-30 | Stop reason: HOSPADM

## 2017-06-30 RX ORDER — HYDROMORPHONE HYDROCHLORIDE 1 MG/ML
0.6 INJECTION, SOLUTION INTRAMUSCULAR; INTRAVENOUS; SUBCUTANEOUS EVERY 5 MIN PRN
Status: DISCONTINUED | OUTPATIENT
Start: 2017-06-30 | End: 2017-06-30 | Stop reason: HOSPADM

## 2017-06-30 RX ORDER — NALOXONE HYDROCHLORIDE 0.4 MG/ML
80 INJECTION, SOLUTION INTRAMUSCULAR; INTRAVENOUS; SUBCUTANEOUS AS NEEDED
Status: DISCONTINUED | OUTPATIENT
Start: 2017-06-30 | End: 2017-06-30 | Stop reason: HOSPADM

## 2017-06-30 RX ORDER — HALOPERIDOL 5 MG/ML
0.25 INJECTION INTRAMUSCULAR ONCE AS NEEDED
Status: DISCONTINUED | OUTPATIENT
Start: 2017-06-30 | End: 2017-06-30 | Stop reason: HOSPADM

## 2017-06-30 RX ORDER — METOCLOPRAMIDE 10 MG/1
10 TABLET ORAL ONCE
Status: DISCONTINUED | OUTPATIENT
Start: 2017-06-30 | End: 2017-06-30 | Stop reason: HOSPADM

## 2017-06-30 RX ORDER — HYDROCODONE BITARTRATE AND ACETAMINOPHEN 7.5; 325 MG/1; MG/1
1 TABLET ORAL EVERY 4 HOURS PRN
Status: DISCONTINUED | OUTPATIENT
Start: 2017-06-30 | End: 2017-07-02

## 2017-06-30 RX ORDER — HEPARIN SODIUM 1000 [USP'U]/ML
INJECTION, SOLUTION INTRAVENOUS; SUBCUTANEOUS AS NEEDED
Status: DISCONTINUED | OUTPATIENT
Start: 2017-06-30 | End: 2017-06-30 | Stop reason: HOSPADM

## 2017-06-30 RX ORDER — MORPHINE SULFATE 2 MG/ML
2 INJECTION, SOLUTION INTRAMUSCULAR; INTRAVENOUS EVERY 10 MIN PRN
Status: DISCONTINUED | OUTPATIENT
Start: 2017-06-30 | End: 2017-06-30 | Stop reason: HOSPADM

## 2017-06-30 RX ORDER — HYDROMORPHONE HYDROCHLORIDE 1 MG/ML
0.2 INJECTION, SOLUTION INTRAMUSCULAR; INTRAVENOUS; SUBCUTANEOUS EVERY 5 MIN PRN
Status: DISCONTINUED | OUTPATIENT
Start: 2017-06-30 | End: 2017-06-30 | Stop reason: HOSPADM

## 2017-06-30 RX ORDER — LIDOCAINE HYDROCHLORIDE 10 MG/ML
INJECTION, SOLUTION EPIDURAL; INFILTRATION; INTRACAUDAL; PERINEURAL AS NEEDED
Status: DISCONTINUED | OUTPATIENT
Start: 2017-06-30 | End: 2017-06-30 | Stop reason: SURG

## 2017-06-30 RX ORDER — ONDANSETRON 2 MG/ML
4 INJECTION INTRAMUSCULAR; INTRAVENOUS EVERY 6 HOURS PRN
Status: DISCONTINUED | OUTPATIENT
Start: 2017-06-30 | End: 2017-07-02

## 2017-06-30 RX ORDER — MIDAZOLAM HYDROCHLORIDE 1 MG/ML
INJECTION INTRAMUSCULAR; INTRAVENOUS AS NEEDED
Status: DISCONTINUED | OUTPATIENT
Start: 2017-06-30 | End: 2017-06-30 | Stop reason: SURG

## 2017-06-30 RX ORDER — SODIUM CHLORIDE 9 MG/ML
INJECTION, SOLUTION INTRAVENOUS CONTINUOUS
Status: DISCONTINUED | OUTPATIENT
Start: 2017-06-30 | End: 2017-07-02

## 2017-06-30 RX ORDER — ACETAMINOPHEN 325 MG/1
650 TABLET ORAL ONCE
Status: COMPLETED | OUTPATIENT
Start: 2017-06-30 | End: 2017-06-30

## 2017-06-30 RX ORDER — ONDANSETRON 2 MG/ML
4 INJECTION INTRAMUSCULAR; INTRAVENOUS ONCE AS NEEDED
Status: DISCONTINUED | OUTPATIENT
Start: 2017-06-30 | End: 2017-06-30 | Stop reason: HOSPADM

## 2017-06-30 RX ORDER — FAMOTIDINE 20 MG/1
20 TABLET ORAL ONCE
Status: DISCONTINUED | OUTPATIENT
Start: 2017-06-30 | End: 2017-06-30 | Stop reason: HOSPADM

## 2017-06-30 RX ORDER — BUPIVACAINE HYDROCHLORIDE 2.5 MG/ML
INJECTION, SOLUTION EPIDURAL; INFILTRATION; INTRACAUDAL AS NEEDED
Status: DISCONTINUED | OUTPATIENT
Start: 2017-06-30 | End: 2017-06-30 | Stop reason: HOSPADM

## 2017-06-30 RX ORDER — GLYCOPYRROLATE 0.2 MG/ML
INJECTION INTRAMUSCULAR; INTRAVENOUS AS NEEDED
Status: DISCONTINUED | OUTPATIENT
Start: 2017-06-30 | End: 2017-06-30 | Stop reason: SURG

## 2017-06-30 RX ORDER — ROSUVASTATIN CALCIUM 10 MG/1
10 TABLET, COATED ORAL DAILY
Status: DISCONTINUED | OUTPATIENT
Start: 2017-06-30 | End: 2017-07-02

## 2017-06-30 RX ORDER — NEOSTIGMINE METHYLSULFATE 0.5 MG/ML
INJECTION INTRAVENOUS AS NEEDED
Status: DISCONTINUED | OUTPATIENT
Start: 2017-06-30 | End: 2017-06-30 | Stop reason: SURG

## 2017-06-30 RX ORDER — MORPHINE SULFATE 4 MG/ML
4 INJECTION, SOLUTION INTRAMUSCULAR; INTRAVENOUS EVERY 10 MIN PRN
Status: DISCONTINUED | OUTPATIENT
Start: 2017-06-30 | End: 2017-06-30 | Stop reason: HOSPADM

## 2017-06-30 RX ORDER — MORPHINE SULFATE 4 MG/ML
8 INJECTION, SOLUTION INTRAMUSCULAR; INTRAVENOUS EVERY 2 HOUR PRN
Status: DISCONTINUED | OUTPATIENT
Start: 2017-06-30 | End: 2017-07-02

## 2017-06-30 RX ORDER — MORPHINE SULFATE 4 MG/ML
6 INJECTION, SOLUTION INTRAMUSCULAR; INTRAVENOUS EVERY 10 MIN PRN
Status: DISCONTINUED | OUTPATIENT
Start: 2017-06-30 | End: 2017-06-30 | Stop reason: HOSPADM

## 2017-06-30 RX ORDER — HYDROMORPHONE HYDROCHLORIDE 1 MG/ML
0.4 INJECTION, SOLUTION INTRAMUSCULAR; INTRAVENOUS; SUBCUTANEOUS EVERY 5 MIN PRN
Status: DISCONTINUED | OUTPATIENT
Start: 2017-06-30 | End: 2017-06-30 | Stop reason: HOSPADM

## 2017-06-30 RX ORDER — HYDROCODONE BITARTRATE AND ACETAMINOPHEN 5; 325 MG/1; MG/1
1 TABLET ORAL AS NEEDED
Status: DISCONTINUED | OUTPATIENT
Start: 2017-06-30 | End: 2017-06-30 | Stop reason: HOSPADM

## 2017-06-30 RX ORDER — MORPHINE SULFATE 4 MG/ML
4 INJECTION, SOLUTION INTRAMUSCULAR; INTRAVENOUS EVERY 2 HOUR PRN
Status: DISCONTINUED | OUTPATIENT
Start: 2017-06-30 | End: 2017-07-02

## 2017-06-30 RX ORDER — MORPHINE SULFATE 2 MG/ML
2 INJECTION, SOLUTION INTRAMUSCULAR; INTRAVENOUS EVERY 2 HOUR PRN
Status: DISCONTINUED | OUTPATIENT
Start: 2017-06-30 | End: 2017-07-02

## 2017-06-30 RX ORDER — ONDANSETRON 2 MG/ML
INJECTION INTRAMUSCULAR; INTRAVENOUS AS NEEDED
Status: DISCONTINUED | OUTPATIENT
Start: 2017-06-30 | End: 2017-06-30 | Stop reason: SURG

## 2017-06-30 RX ADMIN — SODIUM CHLORIDE, SODIUM LACTATE, POTASSIUM CHLORIDE, CALCIUM CHLORIDE: 600; 310; 30; 20 INJECTION, SOLUTION INTRAVENOUS at 09:34:00

## 2017-06-30 RX ADMIN — GLYCOPYRROLATE 0.3 MG: 0.2 INJECTION INTRAMUSCULAR; INTRAVENOUS at 10:23:00

## 2017-06-30 RX ADMIN — MIDAZOLAM HYDROCHLORIDE 2 MG: 1 INJECTION INTRAMUSCULAR; INTRAVENOUS at 09:36:00

## 2017-06-30 RX ADMIN — EPHEDRINE SULFATE 10 MG: 50 INJECTION, SOLUTION INTRAVENOUS at 09:49:00

## 2017-06-30 RX ADMIN — NEOSTIGMINE METHYLSULFATE 1 MG: 0.5 INJECTION INTRAVENOUS at 10:25:00

## 2017-06-30 RX ADMIN — SODIUM CHLORIDE, SODIUM LACTATE, POTASSIUM CHLORIDE, CALCIUM CHLORIDE: 600; 310; 30; 20 INJECTION, SOLUTION INTRAVENOUS at 10:15:00

## 2017-06-30 RX ADMIN — SODIUM CHLORIDE, SODIUM LACTATE, POTASSIUM CHLORIDE, CALCIUM CHLORIDE: 600; 310; 30; 20 INJECTION, SOLUTION INTRAVENOUS at 10:35:00

## 2017-06-30 RX ADMIN — DEXAMETHASONE SODIUM PHOSPHATE 4 MG: 4 MG/ML VIAL (ML) INJECTION at 09:48:00

## 2017-06-30 RX ADMIN — LIDOCAINE HYDROCHLORIDE 50 MG: 10 INJECTION, SOLUTION EPIDURAL; INFILTRATION; INTRACAUDAL; PERINEURAL at 09:40:00

## 2017-06-30 RX ADMIN — ONDANSETRON 4 MG: 2 INJECTION INTRAMUSCULAR; INTRAVENOUS at 09:48:00

## 2017-06-30 RX ADMIN — ROCURONIUM BROMIDE 40 MG: 10 INJECTION, SOLUTION INTRAVENOUS at 09:40:00

## 2017-06-30 RX ADMIN — NEOSTIGMINE METHYLSULFATE 2 MG: 0.5 INJECTION INTRAVENOUS at 10:23:00

## 2017-06-30 RX ADMIN — GLYCOPYRROLATE 0.2 MG: 0.2 INJECTION INTRAMUSCULAR; INTRAVENOUS at 10:25:00

## 2017-06-30 NOTE — ANESTHESIA PREPROCEDURE EVALUATION
Anesthesia PreOp Note    HPI:     Sona Pryor is a 76year old female who presents for preoperative consultation requested by: Ashish Barragan MD    Date of Surgery: 6/30/2017    Procedure(s):  LAPAROSCOPIC CHOLECYSTECTOMY  Indication: Gallstone occasions    Drug use: No    Sexual activity: Not on file     Other Topics Concern   None on file     Social History Narrative   None on file       Available pre-op labs reviewed.     Lab Results  Component Value Date   WBC 9.7 05/31/2017   RBC 3.98 05/31/2 Patient  Discussed plan with:  CRNA, attending and surgeon  Provider Attestation (if preop done by other):  PONV,dental damages,possible reasons for ''long recovery'' and effects of anesthetics.       I have informed Sona Yan  of the nature of the ane

## 2017-06-30 NOTE — BRIEF OP NOTE
Pre-Operative Diagnosis: Gallstone pancreatitis      Post-Operative Diagnosis: Gallstone pancreatitis      Procedure Performed:   Procedure(s):  Laparoscopic CHOLYCYSTECTOMY    Surgeon(s) and Role:     * Nolvia Maddox MD - Primary    Assistant(

## 2017-06-30 NOTE — H&P
[facility]    History & Physical    [address] [place] Patient Status:  Hospital Outpatient Surgery    1940 MRN [id number]   Location [facility] Attending Prashanth Abreu MD   Hosp Day # 0 PCP CHIN LUEVANO rhythm  Abdominal: soft, non-tender; bowel sounds normal; no masses,  no organomegaly  Extremities: extremities normal, atraumatic, no cyanosis or edema    Results:     Lab Results  Component Value Date   WBC 9.7 05/31/2017   HGB 11.4 (L) 05/31/2017   HCT

## 2017-06-30 NOTE — ANESTHESIA POSTPROCEDURE EVALUATION
Patient: Sona Labm    Procedure Summary     Date:  06/30/17 Room / Location:  [address] [place] [address] MAIN OR 04 / [address] [place] [address] MAIN OR    Anesthesia Start:  6651 Anesthesia Stop:      Procedure:  LAPAROSCOPIC CHOLECYSTECTOMY (N/A ) Diagnosis:  (Gallstone pancreatitis )    Gabriel Montelongo

## 2017-07-01 VITALS
SYSTOLIC BLOOD PRESSURE: 108 MMHG | HEART RATE: 66 BPM | RESPIRATION RATE: 16 BRPM | TEMPERATURE: 98 F | HEIGHT: 63 IN | BODY MASS INDEX: 20.02 KG/M2 | OXYGEN SATURATION: 95 % | DIASTOLIC BLOOD PRESSURE: 54 MMHG | WEIGHT: 113 LBS

## 2017-07-01 RX ORDER — MORPHINE SULFATE 2 MG/ML
2 INJECTION, SOLUTION INTRAMUSCULAR; INTRAVENOUS EVERY 4 HOURS PRN
Status: DISCONTINUED | OUTPATIENT
Start: 2017-07-01 | End: 2017-07-02

## 2017-07-01 NOTE — PROGRESS NOTES
[facility]    Progress Note    Sona LANDIN Chris Ash Patient Status:  Inpatient    1940 MRN [id number]   Location [facility] 4W/SW/SE Attending Godfrey Tolbert MD   Hosp Day # 1 PCP Judson Webster MD     Subjective: 06/30/2017   CA 8.8 06/30/2017   ALB 3.0 (L) 06/30/2017   ALKPHO 64 06/30/2017   BILT 0.3 06/30/2017   TP 5.9 06/30/2017   AST 34 06/30/2017   ALT 20 06/30/2017   EMELYN 1,992 (H) 05/18/2017   LIP 89 (H) 05/21/2017                 **Certification      PHYSICI

## 2017-07-01 NOTE — OPERATIVE REPORT
[facility]    PATIENT'S NAME: YANI, [address]   ATTENDING PHYSICIAN: Ale Bustamante. MD Yumiko   OPERATING PHYSICIAN: Ale Bustamante.  Regis Simpson MD   PATIENT ACCOUNT#:   [id number]    LOCATION:  [address] [place] [address] RECORD #:   [id number]       HERIBERTO and cystic artery. Both were individually ligated with Endoclips and transected. Gallbladder removed from the liver bed with sharp dissection and occasional cautery. It was removed sterilely through the umbilical port.   We irrigated the right upper quad

## 2017-07-01 NOTE — PROGRESS NOTES
Sona IS FEELING OK TODAY  D/O GAS PAIN  SOME MINIMAL NAUSEA  NOT MUCH OF AN APPETITE  NO FEVER OR CHILLS  ABDOMEN IS SOFT AND DISTENDED  GOOD BOWEL SOUNDS  S/P LAP ALIA  PROGRESSING SLOWLY  PT ENCOURAGED TO CONTINUE AMBULATING

## 2017-07-01 NOTE — H&P
[facility]    PATIENT'S NAME: YANI, [address] [place] [address]   ATTENDING PHYSICIAN: Tony Jurado.  Virlinda Angelucci, MD   PATIENT ACCOUNT#:   [id number]    LOCATION:  [address] [place] [address] RECORD #:   [id number]       YOB: 1940  ADMISSION DATE:       06 place.  She also has some pain in the left shoulder, a rotator cuff tear in the left shoulder. NEUROLOGIC:  Cranial nerves intact. Neurologically intact. IMPRESSION AND PLAN:    1.    This is a 13-year-old lady with history of biliary pancreatitis, sta

## 2017-07-02 PROBLEM — M75.112 INCOMPLETE TEAR OF LEFT ROTATOR CUFF: Status: ACTIVE | Noted: 2017-07-02

## 2017-07-02 PROBLEM — E78.5 HYPERLIPIDEMIA LDL GOAL <130: Status: ACTIVE | Noted: 2017-07-02

## 2017-07-02 PROBLEM — Z90.49 S/P LAPAROSCOPIC CHOLECYSTECTOMY: Status: ACTIVE | Noted: 2017-07-02

## 2017-07-02 PROBLEM — F41.9 ANXIETY DISORDER: Status: ACTIVE | Noted: 2017-07-02

## 2017-07-02 RX ORDER — HYDROCODONE BITARTRATE AND ACETAMINOPHEN 7.5; 325 MG/1; MG/1
1 TABLET ORAL EVERY 4 HOURS PRN
Qty: 40 TABLET | Refills: 1 | Status: SHIPPED | OUTPATIENT
Start: 2017-07-02 | End: 2018-08-07 | Stop reason: ALTCHOICE

## 2017-07-03 NOTE — DISCHARGE SUMMARY
[facility]    PATIENT'S NAME: YANI, [address] [place] [address]   ATTENDING PHYSICIAN: Kristian Goetz MD   PATIENT ACCOUNT#:   [id number]    LOCATION:  [address] [place] [address] RECORD #:   [id number]       YOB: 1940  ADMISSION DATE:       06

## 2017-08-02 ENCOUNTER — HOSPITAL ENCOUNTER (OUTPATIENT)
Facility: HOSPITAL | Age: 77
Setting detail: HOSPITAL OUTPATIENT SURGERY
Discharge: HOME | End: 2017-08-02
Attending: INTERNAL MEDICINE | Admitting: INTERNAL MEDICINE
Payer: MEDICARE

## 2017-08-02 ENCOUNTER — ANESTHESIA (OUTPATIENT)
Dept: ENDOSCOPY | Facility: HOSPITAL | Age: 77
End: 2017-08-02

## 2017-08-02 ENCOUNTER — APPOINTMENT (OUTPATIENT)
Dept: GENERAL RADIOLOGY | Facility: HOSPITAL | Age: 77
End: 2017-08-02
Attending: INTERNAL MEDICINE
Payer: MEDICARE

## 2017-08-02 ENCOUNTER — ANESTHESIA EVENT (OUTPATIENT)
Dept: ENDOSCOPY | Facility: HOSPITAL | Age: 77
End: 2017-08-02

## 2017-08-02 ENCOUNTER — SURGERY (OUTPATIENT)
Age: 77
End: 2017-08-02

## 2017-08-02 PROCEDURE — 0FC98ZZ EXTIRPATION OF MATTER FROM COMMON BILE DUCT, VIA NATURAL OR ARTIFICIAL OPENING ENDOSCOPIC: ICD-10-PCS | Performed by: INTERNAL MEDICINE

## 2017-08-02 PROCEDURE — 74330 X-RAY BILE/PANC ENDOSCOPY: CPT | Performed by: INTERNAL MEDICINE

## 2017-08-02 PROCEDURE — 0FPB8DZ REMOVAL OF INTRALUMINAL DEVICE FROM HEPATOBILIARY DUCT, VIA NATURAL OR ARTIFICIAL OPENING ENDOSCOPIC: ICD-10-PCS | Performed by: INTERNAL MEDICINE

## 2017-08-02 RX ORDER — NALOXONE HYDROCHLORIDE 0.4 MG/ML
80 INJECTION, SOLUTION INTRAMUSCULAR; INTRAVENOUS; SUBCUTANEOUS AS NEEDED
Status: DISCONTINUED | OUTPATIENT
Start: 2017-08-02 | End: 2017-08-02

## 2017-08-02 RX ORDER — SODIUM CHLORIDE, SODIUM LACTATE, POTASSIUM CHLORIDE, CALCIUM CHLORIDE 600; 310; 30; 20 MG/100ML; MG/100ML; MG/100ML; MG/100ML
INJECTION, SOLUTION INTRAVENOUS CONTINUOUS PRN
Status: DISCONTINUED | OUTPATIENT
Start: 2017-08-02 | End: 2017-08-02 | Stop reason: SURG

## 2017-08-02 RX ORDER — SODIUM CHLORIDE, SODIUM LACTATE, POTASSIUM CHLORIDE, CALCIUM CHLORIDE 600; 310; 30; 20 MG/100ML; MG/100ML; MG/100ML; MG/100ML
INJECTION, SOLUTION INTRAVENOUS CONTINUOUS
Status: DISCONTINUED | OUTPATIENT
Start: 2017-08-02 | End: 2017-08-02

## 2017-08-02 RX ADMIN — SODIUM CHLORIDE, SODIUM LACTATE, POTASSIUM CHLORIDE, CALCIUM CHLORIDE: 600; 310; 30; 20 INJECTION, SOLUTION INTRAVENOUS at 15:45:00

## 2017-08-02 RX ADMIN — SODIUM CHLORIDE, SODIUM LACTATE, POTASSIUM CHLORIDE, CALCIUM CHLORIDE: 600; 310; 30; 20 INJECTION, SOLUTION INTRAVENOUS at 16:24:00

## 2017-08-02 NOTE — OPERATIVE REPORT
Ercp:  Old stent removed  3 stones removed with 9/12 fr balloon. Plan:  Clear liquids - advance as tolerated  Fu office in 4 weeks. #[id number].

## 2017-08-02 NOTE — ANESTHESIA PREPROCEDURE EVALUATION
Anesthesia PreOp Note    HPI:     Sona Hall is a 76year old female who presents for preoperative consultation requested by: Duyen French MD    Date of Surgery: 8/2/2017    Procedure(s):  ENDOSCOPIC RETROGRADE CHOLANGIOPANCREATOGRAPHY (ERCP)  Lyssa name: N/A    Years of education: N/A  Number of children: N/A     Occupational History  None on file     Social History Main Topics   Smoking status: Former Smoker     Smokeless tobacco: Never Used    Alcohol use Yes    Comment: occasional glass of wine on of the patient's questions were answered to the best of my ability. The patient desires the anesthetic management as planned.   Sharon Arreola  8/2/2017 3:21 PM

## 2017-08-02 NOTE — ANESTHESIA POSTPROCEDURE EVALUATION
Patient: Sona Yan    Procedure Summary     Date:  08/02/17 Room / Location:  [address] [place] [address] ENDOSCOPY 01 / [address] [place] [address] ENDOSCOPY    Anesthesia Start:  5741 Anesthesia Stop:  8229    Procedure:  ENDOSCOPIC RETROGRADE CHOLANGIOPANCREATOGRAPHY (ERCP) (N/A ) Diagnosis:

## 2017-08-02 NOTE — H&P
Pre op H&P    cbd stones and stent    meds reviewed  PE:  Lung cta  Heart S1S2 +  abd - soft, non tender  extre - no cce  A&Ox3    A/ as above  P/ ercp

## 2017-08-03 VITALS
BODY MASS INDEX: 20.02 KG/M2 | HEIGHT: 63 IN | SYSTOLIC BLOOD PRESSURE: 139 MMHG | DIASTOLIC BLOOD PRESSURE: 64 MMHG | RESPIRATION RATE: 14 BRPM | OXYGEN SATURATION: 96 % | HEART RATE: 68 BPM | WEIGHT: 113 LBS

## 2017-08-03 NOTE — OPERATIVE REPORT
[facility]    PATIENT'S NAME: BERNIE LOMELI   ATTENDING PHYSICIAN: Santi Araujo MD   OPERATING PHYSICIAN: Santi Araujo MD   PATIENT ACCOUNT#:   [id number]    LOCATION:  [facility] ROOM 2 [facility] 10  MEDICAL RECORD #:   [id number] tolerated, advance diet. 2.   Call MD in event of severe pain or bleeding. 3.   Further recommendations after the workup and treatment. Dictated By Cammie Perry.  Kelsea Reis MD  d: 08/02/2017 16:37:47  t: 08/02/2017 19:27:48  [facility] [id number]  UPP/

## 2018-06-27 ENCOUNTER — LAB ENCOUNTER (OUTPATIENT)
Dept: LAB | Facility: HOSPITAL | Age: 78
End: 2018-06-27
Attending: INTERNAL MEDICINE
Payer: MEDICARE

## 2018-06-27 DIAGNOSIS — E78.2 MIXED HYPERLIPIDEMIA: ICD-10-CM

## 2018-06-27 DIAGNOSIS — D50.9 IRON DEFICIENCY ANEMIA, UNSPECIFIED: Primary | ICD-10-CM

## 2018-06-27 DIAGNOSIS — N30.90 DIVERTICULITIS OF BLADDER: ICD-10-CM

## 2018-06-27 PROCEDURE — 36415 COLL VENOUS BLD VENIPUNCTURE: CPT

## 2018-06-27 PROCEDURE — 83540 ASSAY OF IRON: CPT

## 2018-06-27 PROCEDURE — 85025 COMPLETE CBC W/AUTO DIFF WBC: CPT

## 2018-06-27 PROCEDURE — 80053 COMPREHEN METABOLIC PANEL: CPT

## 2018-06-27 PROCEDURE — 81001 URINALYSIS AUTO W/SCOPE: CPT

## 2018-06-27 PROCEDURE — 80061 LIPID PANEL: CPT

## 2018-06-27 PROCEDURE — 84466 ASSAY OF TRANSFERRIN: CPT

## 2018-07-06 ENCOUNTER — LAB ENCOUNTER (OUTPATIENT)
Dept: LAB | Facility: HOSPITAL | Age: 78
End: 2018-07-06
Attending: INTERNAL MEDICINE
Payer: MEDICARE

## 2018-07-06 DIAGNOSIS — N30.00 ACUTE CYSTITIS: Primary | ICD-10-CM

## 2018-07-06 LAB
BACTERIA UR QL AUTO: NEGATIVE /HPF
BILIRUB UR QL: NEGATIVE
COLOR UR: YELLOW
GLUCOSE UR-MCNC: NEGATIVE MG/DL
KETONES UR-MCNC: NEGATIVE MG/DL
NITRITE UR QL STRIP.AUTO: POSITIVE
PH UR: 5 [PH] (ref 5–8)
PROT UR-MCNC: 100 MG/DL
RBC #/AREA URNS AUTO: <1 /HPF
SP GR UR STRIP: 1.02 (ref 1–1.03)
UROBILINOGEN UR STRIP-ACNC: <2
VIT C UR-MCNC: NEGATIVE MG/DL
WBC #/AREA URNS AUTO: 1 /HPF

## 2018-07-06 PROCEDURE — 87077 CULTURE AEROBIC IDENTIFY: CPT

## 2018-07-06 PROCEDURE — 87086 URINE CULTURE/COLONY COUNT: CPT

## 2018-07-06 PROCEDURE — 87186 SC STD MICRODIL/AGAR DIL: CPT

## 2018-07-06 PROCEDURE — 81001 URINALYSIS AUTO W/SCOPE: CPT

## 2018-07-21 ENCOUNTER — HOSPITAL ENCOUNTER (OUTPATIENT)
Dept: MAMMOGRAPHY | Facility: HOSPITAL | Age: 78
Discharge: HOME | End: 2018-07-21
Attending: INTERNAL MEDICINE
Payer: MEDICARE

## 2018-07-21 DIAGNOSIS — Z12.31 ENCOUNTER FOR SCREENING MAMMOGRAM FOR MALIGNANT NEOPLASM OF BREAST: ICD-10-CM

## 2018-07-21 PROCEDURE — 77067 SCR MAMMO BI INCL CAD: CPT | Performed by: INTERNAL MEDICINE

## 2018-07-24 ENCOUNTER — HOSPITAL ENCOUNTER (OUTPATIENT)
Dept: ULTRASOUND IMAGING | Facility: HOSPITAL | Age: 78
Discharge: HOME | End: 2018-07-24
Attending: INTERNAL MEDICINE
Payer: MEDICARE

## 2018-07-24 ENCOUNTER — HOSPITAL ENCOUNTER (OUTPATIENT)
Dept: MAMMOGRAPHY | Facility: HOSPITAL | Age: 78
Discharge: HOME | End: 2018-07-24
Attending: INTERNAL MEDICINE
Payer: MEDICARE

## 2018-07-24 ENCOUNTER — TELEPHONE (OUTPATIENT)
Dept: MAMMOGRAPHY | Facility: HOSPITAL | Age: 78
End: 2018-07-24

## 2018-07-24 ENCOUNTER — NURSE NAVIGATOR ENCOUNTER (OUTPATIENT)
Dept: HEMATOLOGY/ONCOLOGY | Facility: HOSPITAL | Age: 78
End: 2018-07-24

## 2018-07-24 DIAGNOSIS — R92.8 ABNORMAL MAMMOGRAM: ICD-10-CM

## 2018-07-24 PROCEDURE — 77065 DX MAMMO INCL CAD UNI: CPT | Performed by: INTERNAL MEDICINE

## 2018-07-24 PROCEDURE — 76642 ULTRASOUND BREAST LIMITED: CPT | Performed by: INTERNAL MEDICINE

## 2018-07-24 NOTE — TELEPHONE ENCOUNTER
LM to call back (Anne's number). Attempting to reach pt to discuss Ultrasound guided breast biopsy recommendation from Dr. Bob Razo today.

## 2018-07-25 NOTE — PROGRESS NOTES
Phoned patient and introduced myself as Breast RN Navigator.   Patient presented 7/24/18 for breast imaging and was recommended for a right breast US guided biopsy for the following findings:    CONCLUSION:    The 1 cm mass in the right breast at 9-10 o'janey hold these medications for 5 days prior to biopsy. Reviewed US guided biopsy procedure, as below. You will be lying on your back, potentially slightly toward one side, for this procedure.   The US technician will use the ultrasound machine to locate cancer: None

## 2018-07-27 ENCOUNTER — HOSPITAL ENCOUNTER (OUTPATIENT)
Dept: MAMMOGRAPHY | Facility: HOSPITAL | Age: 78
Discharge: HOME | End: 2018-07-27
Attending: INTERNAL MEDICINE
Payer: MEDICARE

## 2018-07-27 ENCOUNTER — HOSPITAL ENCOUNTER (OUTPATIENT)
Dept: ULTRASOUND IMAGING | Facility: HOSPITAL | Age: 78
Discharge: HOME | End: 2018-07-27
Attending: INTERNAL MEDICINE
Payer: MEDICARE

## 2018-07-27 VITALS — DIASTOLIC BLOOD PRESSURE: 55 MMHG | RESPIRATION RATE: 16 BRPM | SYSTOLIC BLOOD PRESSURE: 128 MMHG | HEART RATE: 68 BPM

## 2018-07-27 DIAGNOSIS — R92.8 OTH ABN AND INCONCLUSIVE FINDINGS ON DX IMAGING OF BREAST: ICD-10-CM

## 2018-07-27 DIAGNOSIS — N63.10 MASS OF RIGHT BREAST: ICD-10-CM

## 2018-07-27 PROCEDURE — 88305 TISSUE EXAM BY PATHOLOGIST: CPT | Performed by: INTERNAL MEDICINE

## 2018-07-27 PROCEDURE — 19083 BX BREAST 1ST LESION US IMAG: CPT | Performed by: INTERNAL MEDICINE

## 2018-07-27 PROCEDURE — 88342 IMHCHEM/IMCYTCHM 1ST ANTB: CPT | Performed by: INTERNAL MEDICINE

## 2018-07-27 PROCEDURE — 77065 DX MAMMO INCL CAD UNI: CPT | Performed by: INTERNAL MEDICINE

## 2018-07-27 PROCEDURE — 88360 TUMOR IMMUNOHISTOCHEM/MANUAL: CPT | Performed by: INTERNAL MEDICINE

## 2018-07-27 NOTE — PROCEDURES
[place] [facility] - [facility]  Procedure Note    Sona LANDIN Filippo Sood Patient Status:  Outpatient    1940 MRN [id number]   Location [facility] Attending Pat Steven, *   Hosp Day # 0 PCP Bekah Arana MD     Pro

## 2018-07-27 NOTE — IMAGING NOTE
PT ARRIVED TO ROOM 4.    SCANS BY NGOC ORTEGA  TECH    HX TAKEN PROCEDURE EXPLAINED QUESTIONS ANSWERED    PT CONSENTED AT 0800    SCOUTS SCANS COMPLETED BY   [address] [place] [address] CLEANED [address] [place] [address] PREP TO SITE STERILE DRAPE TO SITE PATHOLOGY NOTIFIED  SITE

## 2018-07-31 ENCOUNTER — TELEPHONE (OUTPATIENT)
Dept: HEMATOLOGY/ONCOLOGY | Facility: HOSPITAL | Age: 78
End: 2018-07-31

## 2018-07-31 NOTE — TELEPHONE ENCOUNTER
Call received from Dr. Yoli Lombardo regarding patient. Patient is aware of her malignant pathology s/p right breast biopsy, and has been referred to Dr. Dana Cormier and Dr. Didi Perkins for further management. Spoke with patient, who is known to me previously.   Eileen Edwards

## 2018-08-01 ENCOUNTER — OFFICE VISIT (OUTPATIENT)
Dept: SURGERY | Facility: CLINIC | Age: 78
End: 2018-08-01
Payer: MEDICARE

## 2018-08-01 ENCOUNTER — HOSPITAL ENCOUNTER (OUTPATIENT)
Facility: HOSPITAL | Age: 78
Discharge: HOME | End: 2018-08-01
Attending: SURGERY
Payer: MEDICARE

## 2018-08-01 VITALS
SYSTOLIC BLOOD PRESSURE: 159 MMHG | BODY MASS INDEX: 20.37 KG/M2 | DIASTOLIC BLOOD PRESSURE: 76 MMHG | HEIGHT: 63 IN | WEIGHT: 115 LBS | OXYGEN SATURATION: 98 % | HEART RATE: 70 BPM | RESPIRATION RATE: 18 BRPM

## 2018-08-01 DIAGNOSIS — C50.411 MALIGNANT NEOPLASM OF UPPER-OUTER QUADRANT OF RIGHT BREAST IN FEMALE, ESTROGEN RECEPTOR POSITIVE (HCC): Primary | ICD-10-CM

## 2018-08-01 DIAGNOSIS — Z17.0 MALIGNANT NEOPLASM OF UPPER-OUTER QUADRANT OF RIGHT BREAST IN FEMALE, ESTROGEN RECEPTOR POSITIVE (HCC): Primary | ICD-10-CM

## 2018-08-01 PROCEDURE — 99211 OFF/OP EST MAY X REQ PHY/QHP: CPT | Performed by: SURGERY

## 2018-08-01 PROCEDURE — 99205 OFFICE O/P NEW HI 60 MIN: CPT | Performed by: SURGERY

## 2018-08-01 RX ORDER — ATORVASTATIN CALCIUM 20 MG/1
TABLET, FILM COATED ORAL
Refills: 1 | COMMUNITY
Start: 2018-07-06 | End: 2022-01-05

## 2018-08-01 RX ORDER — CLONAZEPAM 0.5 MG/1
TABLET ORAL
Refills: 2 | COMMUNITY
Start: 2018-07-22 | End: 2021-04-27

## 2018-08-01 RX ORDER — SULFAMETHOXAZOLE AND TRIMETHOPRIM 800; 160 MG/1; MG/1
TABLET ORAL
Refills: 1 | COMMUNITY
Start: 2018-07-22 | End: 2018-08-07 | Stop reason: ALTCHOICE

## 2018-08-01 NOTE — PROGRESS NOTES
Education Record    Learner:  Patient    Disease / Diagnosis:surgical instructions    Barriers / Limitations:  None   Comments:    Method:  Discussion and Printed material   Comments:    General Topics:  Precautions, Procedure and Plan of care reviewed   C

## 2018-08-02 ENCOUNTER — OFFICE VISIT (OUTPATIENT)
Dept: HEMATOLOGY/ONCOLOGY | Facility: HOSPITAL | Age: 78
End: 2018-08-02
Attending: INTERNAL MEDICINE
Payer: MEDICARE

## 2018-08-02 ENCOUNTER — LAB ENCOUNTER (OUTPATIENT)
Dept: LAB | Facility: HOSPITAL | Age: 78
End: 2018-08-02
Attending: INTERNAL MEDICINE
Payer: MEDICARE

## 2018-08-02 VITALS
SYSTOLIC BLOOD PRESSURE: 121 MMHG | BODY MASS INDEX: 22.5 KG/M2 | HEIGHT: 63 IN | TEMPERATURE: 98 F | RESPIRATION RATE: 18 BRPM | HEART RATE: 70 BPM | WEIGHT: 127 LBS | DIASTOLIC BLOOD PRESSURE: 68 MMHG

## 2018-08-02 DIAGNOSIS — N30.00 ACUTE CYSTITIS: Primary | ICD-10-CM

## 2018-08-02 DIAGNOSIS — C50.911 MALIGNANT NEOPLASM OF RIGHT BREAST IN FEMALE, ESTROGEN RECEPTOR POSITIVE, UNSPECIFIED SITE OF BREAST (HCC): Primary | ICD-10-CM

## 2018-08-02 DIAGNOSIS — Z17.0 MALIGNANT NEOPLASM OF RIGHT BREAST IN FEMALE, ESTROGEN RECEPTOR POSITIVE, UNSPECIFIED SITE OF BREAST (HCC): Primary | ICD-10-CM

## 2018-08-02 LAB
BILIRUB UR QL: NEGATIVE
COLOR UR: YELLOW
GLUCOSE UR-MCNC: NEGATIVE MG/DL
KETONES UR-MCNC: NEGATIVE MG/DL
NITRITE UR QL STRIP.AUTO: POSITIVE
PH UR: 6 [PH] (ref 5–8)
PROT UR-MCNC: 30 MG/DL
RBC #/AREA URNS AUTO: 33 /HPF
SP GR UR STRIP: 1.01 (ref 1–1.03)
UROBILINOGEN UR STRIP-ACNC: <2
VIT C UR-MCNC: NEGATIVE MG/DL
WBC #/AREA URNS AUTO: 417 /HPF

## 2018-08-02 PROCEDURE — 87086 URINE CULTURE/COLONY COUNT: CPT

## 2018-08-02 PROCEDURE — 87186 SC STD MICRODIL/AGAR DIL: CPT

## 2018-08-02 PROCEDURE — 99205 OFFICE O/P NEW HI 60 MIN: CPT | Performed by: INTERNAL MEDICINE

## 2018-08-02 PROCEDURE — 81001 URINALYSIS AUTO W/SCOPE: CPT

## 2018-08-02 PROCEDURE — 87077 CULTURE AEROBIC IDENTIFY: CPT

## 2018-08-02 NOTE — PROGRESS NOTES
Breast Surgery New Patient Consultation    This is the first visit for this [de-identified]year old woman, referred by Dr. Joanne Walter, who presents for evaluation of breast cancer.     History of Present Illness:   Ms. Sona Rodgers is a [de-identified]year old woman who presen denies infertility treatment to achieve pregnancy.     Medications:      atorvastatin 20 MG Oral Tab TK 1 T PO QD Disp:  Rfl: 1   Sulfamethoxazole-TMP -160 MG Oral Tab per tablet TK 1 T PO BID FOR 5 DAYS Disp:  Rfl: 1   Ascorbic Acid (VITAMIN C OR) Ta difficulty breathing when lying flat, SOB/Coughing at night, swelling of the legs or chest pain while walking.     Breasts:  See history of present illness    Gastrointestinal:     There is no history of difficulty or pain with swallowing, reflux symptoms, woman who appears her stated age. Her speech patterns and movements are normal. Her affect is appropriate. HEENT: The head is normocephalic. The neck is supple. The thyroid is not enlarged and is without palpable masses/nodules.  There are no palpable ma imaging and pathology we discussed this at length.     The natural history and evolution of breast cancer were discussed with Ms. Sona Tubbs and her family, including the difference between in-situ and invasive carcinoma, and the distinction between loc

## 2018-08-02 NOTE — CONSULTS
[facility]    PATIENT'S NAME: Katelin LOMELI PHYSICIAN: Edmundo Delgado MD   PATIENT ACCOUNT #: [de-identified] LOCATION: [address] [place] [address] RECORD #: [id number] YOB: 1940   CONSULTATION DATE: 08/02/2018       CANCER CENT PHYSICAL EXAMINATION:    GENERAL:  No acute distress. Alert and oriented  VITAL SIGNS:  ECOG performance status is 0. Temperature is 36.8, pulse 70, respiratory rate 18, blood pressure 121/68. Weight is 57.6 kg. HEENT:  Moist mucous membranes.   Namrata Au MD  d: 08/02/2018 12:15:48  t: 08/02/2018 12:25:59  [facility] [id number]  BC/    cc: MD Alonso Horton.  Hollie Avendano MD

## 2018-08-13 ENCOUNTER — ANESTHESIA EVENT (OUTPATIENT)
Dept: SURGERY | Facility: HOSPITAL | Age: 78
End: 2018-08-13
Payer: MEDICARE

## 2018-08-13 ENCOUNTER — HOSPITAL ENCOUNTER (OUTPATIENT)
Facility: HOSPITAL | Age: 78
Setting detail: HOSPITAL OUTPATIENT SURGERY
Discharge: HOME | End: 2018-08-13
Attending: SURGERY | Admitting: SURGERY
Payer: MEDICARE

## 2018-08-13 ENCOUNTER — SURGERY (OUTPATIENT)
Age: 78
End: 2018-08-13

## 2018-08-13 ENCOUNTER — APPOINTMENT (OUTPATIENT)
Dept: MAMMOGRAPHY | Facility: HOSPITAL | Age: 78
End: 2018-08-13
Attending: SURGERY
Payer: MEDICARE

## 2018-08-13 ENCOUNTER — ANESTHESIA (OUTPATIENT)
Dept: SURGERY | Facility: HOSPITAL | Age: 78
End: 2018-08-13
Payer: MEDICARE

## 2018-08-13 ENCOUNTER — HOSPITAL ENCOUNTER (OUTPATIENT)
Dept: ULTRASOUND IMAGING | Facility: HOSPITAL | Age: 78
Discharge: HOME | End: 2018-08-13
Attending: SURGERY
Payer: MEDICARE

## 2018-08-13 ENCOUNTER — HOSPITAL ENCOUNTER (OUTPATIENT)
Dept: MAMMOGRAPHY | Facility: HOSPITAL | Age: 78
Discharge: HOME | End: 2018-08-13
Attending: SURGERY
Payer: MEDICARE

## 2018-08-13 VITALS
RESPIRATION RATE: 14 BRPM | WEIGHT: 127 LBS | TEMPERATURE: 97 F | HEIGHT: 63 IN | OXYGEN SATURATION: 97 % | HEART RATE: 59 BPM | BODY MASS INDEX: 22.5 KG/M2 | SYSTOLIC BLOOD PRESSURE: 126 MMHG | DIASTOLIC BLOOD PRESSURE: 58 MMHG

## 2018-08-13 VITALS — HEART RATE: 62 BPM | DIASTOLIC BLOOD PRESSURE: 64 MMHG | OXYGEN SATURATION: 98 % | SYSTOLIC BLOOD PRESSURE: 126 MMHG

## 2018-08-13 DIAGNOSIS — C50.911 MALIGNANT NEOPLASM OF RIGHT BREAST IN FEMALE, ESTROGEN RECEPTOR NEGATIVE, UNSPECIFIED SITE OF BREAST (HCC): ICD-10-CM

## 2018-08-13 DIAGNOSIS — C50.911 MALIGNANT NEOPLASM OF RIGHT BREAST IN FEMALE, ESTROGEN RECEPTOR POSITIVE, UNSPECIFIED SITE OF BREAST (HCC): ICD-10-CM

## 2018-08-13 DIAGNOSIS — Z17.0 MALIGNANT NEOPLASM OF RIGHT BREAST IN FEMALE, ESTROGEN RECEPTOR POSITIVE, UNSPECIFIED SITE OF BREAST (HCC): ICD-10-CM

## 2018-08-13 DIAGNOSIS — Z17.1 MALIGNANT NEOPLASM OF RIGHT BREAST IN FEMALE, ESTROGEN RECEPTOR NEGATIVE, UNSPECIFIED SITE OF BREAST (HCC): ICD-10-CM

## 2018-08-13 PROCEDURE — 0HBT0ZZ EXCISION OF RIGHT BREAST, OPEN APPROACH: ICD-10-PCS | Performed by: SURGERY

## 2018-08-13 PROCEDURE — 88307 TISSUE EXAM BY PATHOLOGIST: CPT | Performed by: SURGERY

## 2018-08-13 PROCEDURE — 77065 DX MAMMO INCL CAD UNI: CPT | Performed by: SURGERY

## 2018-08-13 PROCEDURE — 19285 PERQ DEV BREAST 1ST US IMAG: CPT | Performed by: SURGERY

## 2018-08-13 PROCEDURE — 76098 X-RAY EXAM SURGICAL SPECIMEN: CPT | Performed by: SURGERY

## 2018-08-13 PROCEDURE — 88360 TUMOR IMMUNOHISTOCHEM/MANUAL: CPT | Performed by: SURGERY

## 2018-08-13 RX ORDER — MAGNESIUM HYDROXIDE 1200 MG/15ML
LIQUID ORAL CONTINUOUS PRN
Status: DISCONTINUED | OUTPATIENT
Start: 2018-08-13 | End: 2018-08-13

## 2018-08-13 RX ORDER — METOCLOPRAMIDE 10 MG/1
10 TABLET ORAL ONCE
Status: DISCONTINUED | OUTPATIENT
Start: 2018-08-13 | End: 2018-08-13 | Stop reason: HOSPADM

## 2018-08-13 RX ORDER — NALOXONE HYDROCHLORIDE 0.4 MG/ML
80 INJECTION, SOLUTION INTRAMUSCULAR; INTRAVENOUS; SUBCUTANEOUS AS NEEDED
Status: DISCONTINUED | OUTPATIENT
Start: 2018-08-13 | End: 2018-08-13

## 2018-08-13 RX ORDER — CEFAZOLIN SODIUM/WATER 2 G/20 ML
2 SYRINGE (ML) INTRAVENOUS ONCE
Status: COMPLETED | OUTPATIENT
Start: 2018-08-13 | End: 2018-08-13

## 2018-08-13 RX ORDER — FAMOTIDINE 20 MG/1
20 TABLET ORAL ONCE
Status: DISCONTINUED | OUTPATIENT
Start: 2018-08-13 | End: 2018-08-13 | Stop reason: HOSPADM

## 2018-08-13 RX ORDER — ONDANSETRON 2 MG/ML
4 INJECTION INTRAMUSCULAR; INTRAVENOUS ONCE AS NEEDED
Status: DISCONTINUED | OUTPATIENT
Start: 2018-08-13 | End: 2018-08-13

## 2018-08-13 RX ORDER — HYDROCODONE BITARTRATE AND ACETAMINOPHEN 5; 325 MG/1; MG/1
1 TABLET ORAL AS NEEDED
Status: DISCONTINUED | OUTPATIENT
Start: 2018-08-13 | End: 2018-08-13

## 2018-08-13 RX ORDER — BUPIVACAINE HYDROCHLORIDE 5 MG/ML
INJECTION, SOLUTION EPIDURAL; INTRACAUDAL AS NEEDED
Status: DISCONTINUED | OUTPATIENT
Start: 2018-08-13 | End: 2018-08-13 | Stop reason: HOSPADM

## 2018-08-13 RX ORDER — HYDROCODONE BITARTRATE AND ACETAMINOPHEN 5; 325 MG/1; MG/1
2 TABLET ORAL AS NEEDED
Status: DISCONTINUED | OUTPATIENT
Start: 2018-08-13 | End: 2018-08-13

## 2018-08-13 RX ORDER — MORPHINE SULFATE 4 MG/ML
4 INJECTION, SOLUTION INTRAMUSCULAR; INTRAVENOUS EVERY 10 MIN PRN
Status: DISCONTINUED | OUTPATIENT
Start: 2018-08-13 | End: 2018-08-13

## 2018-08-13 RX ORDER — ACETAMINOPHEN 500 MG
1000 TABLET ORAL ONCE
Status: COMPLETED | OUTPATIENT
Start: 2018-08-13 | End: 2018-08-13

## 2018-08-13 RX ORDER — LIDOCAINE HYDROCHLORIDE AND EPINEPHRINE 10; 10 MG/ML; UG/ML
INJECTION, SOLUTION INFILTRATION; PERINEURAL AS NEEDED
Status: DISCONTINUED | OUTPATIENT
Start: 2018-08-13 | End: 2018-08-13 | Stop reason: HOSPADM

## 2018-08-13 RX ORDER — MIDAZOLAM HYDROCHLORIDE 1 MG/ML
INJECTION INTRAMUSCULAR; INTRAVENOUS AS NEEDED
Status: DISCONTINUED | OUTPATIENT
Start: 2018-08-13 | End: 2018-08-13 | Stop reason: SURG

## 2018-08-13 RX ORDER — LIDOCAINE HYDROCHLORIDE 10 MG/ML
INJECTION, SOLUTION EPIDURAL; INFILTRATION; INTRACAUDAL; PERINEURAL AS NEEDED
Status: DISCONTINUED | OUTPATIENT
Start: 2018-08-13 | End: 2018-08-13 | Stop reason: SURG

## 2018-08-13 RX ORDER — MORPHINE SULFATE 4 MG/ML
2 INJECTION, SOLUTION INTRAMUSCULAR; INTRAVENOUS EVERY 10 MIN PRN
Status: DISCONTINUED | OUTPATIENT
Start: 2018-08-13 | End: 2018-08-13

## 2018-08-13 RX ORDER — SODIUM CHLORIDE, SODIUM LACTATE, POTASSIUM CHLORIDE, CALCIUM CHLORIDE 600; 310; 30; 20 MG/100ML; MG/100ML; MG/100ML; MG/100ML
INJECTION, SOLUTION INTRAVENOUS CONTINUOUS
Status: DISCONTINUED | OUTPATIENT
Start: 2018-08-13 | End: 2018-08-13

## 2018-08-13 RX ORDER — MORPHINE SULFATE 10 MG/ML
6 INJECTION, SOLUTION INTRAMUSCULAR; INTRAVENOUS EVERY 10 MIN PRN
Status: DISCONTINUED | OUTPATIENT
Start: 2018-08-13 | End: 2018-08-13

## 2018-08-13 RX ORDER — HYDROCODONE BITARTRATE AND ACETAMINOPHEN 5; 325 MG/1; MG/1
1-2 TABLET ORAL EVERY 6 HOURS PRN
Qty: 20 TABLET | Refills: 0 | Status: SHIPPED | OUTPATIENT
Start: 2018-08-13 | End: 2018-08-22 | Stop reason: ALTCHOICE

## 2018-08-13 RX ADMIN — LIDOCAINE HYDROCHLORIDE 50 MG: 10 INJECTION, SOLUTION EPIDURAL; INFILTRATION; INTRACAUDAL; PERINEURAL at 08:33:00

## 2018-08-13 RX ADMIN — SODIUM CHLORIDE, SODIUM LACTATE, POTASSIUM CHLORIDE, CALCIUM CHLORIDE: 600; 310; 30; 20 INJECTION, SOLUTION INTRAVENOUS at 08:29:00

## 2018-08-13 RX ADMIN — SODIUM CHLORIDE, SODIUM LACTATE, POTASSIUM CHLORIDE, CALCIUM CHLORIDE: 600; 310; 30; 20 INJECTION, SOLUTION INTRAVENOUS at 08:45:00

## 2018-08-13 RX ADMIN — MIDAZOLAM HYDROCHLORIDE 2 MG: 1 INJECTION INTRAMUSCULAR; INTRAVENOUS at 08:29:00

## 2018-08-13 RX ADMIN — CEFAZOLIN SODIUM/WATER 2 G: 2 G/20 ML SYRINGE (ML) INTRAVENOUS at 08:36:00

## 2018-08-13 NOTE — OPERATIVE REPORT
[facility]    PATIENT'S NAME: YANI, [address] [facility] [place]   ATTENDING PHYSICIAN: Piyush Stroud. Mariana Mcgowan MD   OPERATING PHYSICIAN: Piyush Stroud.  Mariana Mcgowan MD   PATIENT ACCOUNT#:   [id number]    LOCATION:  [address] 10  MEDICAL RECORD #:   [id number] the procedure were explained to the patient including, but not limited to, infection, bleeding, injury to surrounding structures, the possible need for reoperation, and she agreed to the proposed surgery.     PROCEDURE:  The patient was brought to the imagi quadrant thought to impair ultimate wound healing and cosmesis. This measured approximately 20 sq cm in size. Therefore, an advancement flap mastopexy was performed.   This required a counter incision be placed at the immediately inferior lateral breast p

## 2018-08-13 NOTE — BRIEF OP NOTE
Pre-Operative Diagnosis: Malignant neoplasm of right breast in female, estrogen receptor positive, unspecified site of [facility] [C50.911, Z17.0]     Post-Operative Diagnosis: Malignant neoplasm of right breast in female, estrogen receptor positive, uns

## 2018-08-13 NOTE — H&P
History of Present Illness:   Ms. Sona Jaquez is a [de-identified]year old woman who presents with imaging detected right breast cancer. The patient denies any palpable masses, nipple discharge, skin changes or axillary symptoms.   She has no prior history of breas Oral Tab per tablet TK 1 T PO BID FOR 5 DAYS Disp:  Rfl: 1   Ascorbic Acid (VITAMIN C OR) Take 1 tablet by mouth daily. Disp:  Rfl:    VITAMIN D, CHOLECALCIFEROL, OR Take 1 tablet by mouth daily.  Disp:  Rfl:    hydrocodone-acetaminophen 7.5-325 MG Oral Tab present illness     Gastrointestinal:     There is no history of difficulty or pain with swallowing, reflux symptoms, vomiting, dark or bloody stools, constipation, yellowing of the skin, indigestion, nausea, change in bowel habits, diarrhea, abdominal maury normocephalic. The neck is supple. The thyroid is not enlarged and is without palpable masses/nodules. There are no palpable masses. The trachea is in the midline. Conjunctiva are clear, non-icteric.     Chest: The chest expands symmetrically.  The lungs ar with Ms. Sona Mcfarlane and her family, including the difference between in-situ and invasive carcinoma, and the distinction between local and systemic disease and local and systemic therapy.  For local treatment options, I explained the risks and benefits [C50.911, Z17.0]    The above referenced H&P was reviewed by Sheldon Mukherjee MD on 8/13/2018, the patient was examined and no significant changes have occurred in the patient's condition since the H&P was performed.   I discussed with the patient and/or l

## 2018-08-13 NOTE — ANESTHESIA PREPROCEDURE EVALUATION
Anesthesia PreOp Note    HPI:     Sona Angeles is a [de-identified]year old female who presents for preoperative consultation requested by: Monae Reyes MD    Date of Surgery: 8/13/2018    Procedure(s):  BREAST LUMPECTOMY  Indication: Malignant neoplasm of rig lactated ringers infusion  Intravenous Continuous Sarah Alex MD Last Rate: 20 mL/hr at 08/13/18 1874   famoTIDine (PEPCID) tab 20 mg 20 mg Oral Once Sarah Alex MD    Metoclopramide HCl (REGLAN) tab 10 mg 10 mg Oral Once TIFFANIE Olivas Resp:  16   Temp:  98 °F (36.7 °C)   TempSrc:  Oral   SpO2:  99%   Weight: 56.7 kg (125 lb) 57.6 kg (127 lb)   Height: 1.6 m (5' 3\") 1.6 m (5' 3\")        Anesthesia ROS/Med Hx and Physical Exam     Patient summary reviewed and Nursing notes reviewed    N Informed Consent Plan and Risks Discussed With:  Patient  Discussed plan with:  CRNA      I have informed Sona Restrepostefanie Tayloraver and/or legal guardian or family member of the nature of the anesthetic plan, benefits, risks including possible dental damage if releva

## 2018-08-13 NOTE — PROCEDURES
[facility] HOSP - [facility]  Procedure Note    Sona High Patient Status:  Outpatient    1940 MRN [id number]   [place] [address] Attending Margareth Bernal MD   Hosp Day # 0 PCP Jacob Bass MD     Procedure: Denise Moreno

## 2018-08-13 NOTE — ANESTHESIA POSTPROCEDURE EVALUATION
Patient: Sona Campbell    Procedure Summary     Date:  08/13/18 Room / Location:  [address] [place] [address] MAIN OR 08 / [address] [place] [address] OR    Anesthesia Start:  3358 Anesthesia Stop:  1468    Procedure:  BREAST LUMPECTOMY (Right Breast) Diagnosis:       Malignant neoplasm of right b

## 2018-08-15 ENCOUNTER — TELEPHONE (OUTPATIENT)
Dept: SURGERY | Facility: CLINIC | Age: 78
End: 2018-08-15

## 2018-08-15 NOTE — TELEPHONE ENCOUNTER
Pt is feeling well. Reviewed pathology showed margins are clear, and she is done with surgery. Confirmed her post op appt next week. To call sooner if needed. Questions addressed regarding further treatment.

## 2018-08-22 ENCOUNTER — OFFICE VISIT (OUTPATIENT)
Dept: SURGERY | Facility: CLINIC | Age: 78
End: 2018-08-22
Payer: MEDICARE

## 2018-08-22 VITALS
DIASTOLIC BLOOD PRESSURE: 69 MMHG | RESPIRATION RATE: 20 BRPM | SYSTOLIC BLOOD PRESSURE: 127 MMHG | BODY MASS INDEX: 22.5 KG/M2 | OXYGEN SATURATION: 98 % | HEIGHT: 63 IN | WEIGHT: 127 LBS | HEART RATE: 77 BPM

## 2018-08-22 DIAGNOSIS — Z17.0 MALIGNANT NEOPLASM OF UPPER-OUTER QUADRANT OF RIGHT BREAST IN FEMALE, ESTROGEN RECEPTOR POSITIVE (HCC): Primary | ICD-10-CM

## 2018-08-22 DIAGNOSIS — C50.411 MALIGNANT NEOPLASM OF UPPER-OUTER QUADRANT OF RIGHT BREAST IN FEMALE, ESTROGEN RECEPTOR POSITIVE (HCC): Primary | ICD-10-CM

## 2018-08-22 PROCEDURE — 99024 POSTOP FOLLOW-UP VISIT: CPT | Performed by: SURGERY

## 2018-08-23 NOTE — PROGRESS NOTES
Breast Surgery Post-Operative Visit    Diagnosis: Right breast cancer status post lumpectomy on August 13, 2018. Stage: [id number]    Disease Status:  Surgical treatment complete, Medical Oncology recommendations pending. History:    This [de-identified]year old wom time of first pregnancy. She denies any cumulative breastfeeding history. She achieved menarche at age 11   Age of Menopause: 34  Type: Hysterectomy with ovaries preserved  She has history of hormone replacement therapy for 20 years, last in 1997.   She pressure/discomfort, palpitations, irregular heartbeat, fainting or near-fainting, difficulty breathing when lying flat, SOB/Coughing at night, swelling of the legs or chest pain while walking.     Breasts:  See history of present illness    Elias Physical Examination:   Examination shows that the surgical sites are clean, dry, and healing well. Positive for small seroma near lumpectomy incision.     Impression:   Ms. Almaz Wilde is a [de-identified]year old woman presents with newly diagnosed right quinten

## 2018-08-29 ENCOUNTER — OFFICE VISIT (OUTPATIENT)
Dept: SURGERY | Facility: CLINIC | Age: 78
End: 2018-08-29
Payer: MEDICARE

## 2018-08-29 ENCOUNTER — OFFICE VISIT (OUTPATIENT)
Dept: HEMATOLOGY/ONCOLOGY | Facility: HOSPITAL | Age: 78
End: 2018-08-29
Attending: INTERNAL MEDICINE
Payer: MEDICARE

## 2018-08-29 VITALS
OXYGEN SATURATION: 98 % | RESPIRATION RATE: 20 BRPM | TEMPERATURE: 98 F | WEIGHT: 127 LBS | HEART RATE: 73 BPM | DIASTOLIC BLOOD PRESSURE: 70 MMHG | HEIGHT: 63 IN | BODY MASS INDEX: 22.5 KG/M2 | SYSTOLIC BLOOD PRESSURE: 117 MMHG

## 2018-08-29 VITALS
RESPIRATION RATE: 20 BRPM | WEIGHT: 127 LBS | BODY MASS INDEX: 22.5 KG/M2 | DIASTOLIC BLOOD PRESSURE: 70 MMHG | HEIGHT: 63 IN | OXYGEN SATURATION: 98 % | SYSTOLIC BLOOD PRESSURE: 117 MMHG | HEART RATE: 73 BPM

## 2018-08-29 DIAGNOSIS — Z17.0 MALIGNANT NEOPLASM OF UPPER-OUTER QUADRANT OF RIGHT BREAST IN FEMALE, ESTROGEN RECEPTOR POSITIVE (HCC): Primary | ICD-10-CM

## 2018-08-29 DIAGNOSIS — C50.911 MALIGNANT NEOPLASM OF RIGHT BREAST IN FEMALE, ESTROGEN RECEPTOR POSITIVE, UNSPECIFIED SITE OF BREAST (HCC): Primary | ICD-10-CM

## 2018-08-29 DIAGNOSIS — Z17.0 MALIGNANT NEOPLASM OF RIGHT BREAST IN FEMALE, ESTROGEN RECEPTOR POSITIVE, UNSPECIFIED SITE OF BREAST (HCC): Primary | ICD-10-CM

## 2018-08-29 DIAGNOSIS — Z78.0 MENOPAUSE: ICD-10-CM

## 2018-08-29 DIAGNOSIS — C50.411 MALIGNANT NEOPLASM OF UPPER-OUTER QUADRANT OF RIGHT BREAST IN FEMALE, ESTROGEN RECEPTOR POSITIVE (HCC): Primary | ICD-10-CM

## 2018-08-29 DIAGNOSIS — Z79.899 ENCOUNTER FOR MONITORING ADJUVANT HORMONAL THERAPY: ICD-10-CM

## 2018-08-29 DIAGNOSIS — Z51.81 ENCOUNTER FOR MONITORING ADJUVANT HORMONAL THERAPY: ICD-10-CM

## 2018-08-29 PROCEDURE — 99214 OFFICE O/P EST MOD 30 MIN: CPT | Performed by: INTERNAL MEDICINE

## 2018-08-29 PROCEDURE — 99024 POSTOP FOLLOW-UP VISIT: CPT | Performed by: SURGERY

## 2018-08-29 RX ORDER — ANASTROZOLE 1 MG/1
1 TABLET ORAL DAILY
Qty: 90 TABLET | Refills: 3 | Status: SHIPPED | OUTPATIENT
Start: 2018-08-29 | End: 2019-10-29

## 2018-08-29 NOTE — PROGRESS NOTES
Cancer Center Progress Note    Patient Name: Shilpa Avalos   YOB: 1940   Medical Record Number: [id number]   Attending Physician: Marleni Tobar M.D.      Chief Complaint:  hormone receptor positive right breast cancer    History of Present Illn Social History Narrative   None on file       Current Medications:    Current Outpatient Prescriptions:   •  atorvastatin 20 MG Oral Tab, TK 1 T PO QD, Disp: , Rfl: 1  •  ClonazePAM 0.5 MG Oral Tab, TAKE 2 TABLETS PO QHS, Disp: , Rfl: 2  •  Ascorbic Ac was 10%. Based on age and favorable pathologic features it was not recommended that she receive adjuvant chemotherapy or radiation. Her case was discussed at breast tumor board.   –Would recommend adjuvant hormonal therapy with an aromatase inhibitor wit

## 2018-09-05 ENCOUNTER — LAB ENCOUNTER (OUTPATIENT)
Dept: LAB | Facility: HOSPITAL | Age: 78
End: 2018-09-05
Attending: INTERNAL MEDICINE
Payer: MEDICARE

## 2018-09-05 DIAGNOSIS — N30.90 DIVERTICULITIS OF BLADDER: Primary | ICD-10-CM

## 2018-09-05 LAB
BILIRUB UR QL: NEGATIVE
COLOR UR: YELLOW
GLUCOSE UR-MCNC: NEGATIVE MG/DL
KETONES UR-MCNC: NEGATIVE MG/DL
NITRITE UR QL STRIP.AUTO: NEGATIVE
PH UR: 5 [PH] (ref 5–8)
PROT UR-MCNC: 100 MG/DL
RBC #/AREA URNS AUTO: 31 /HPF
SP GR UR STRIP: 1.02 (ref 1–1.03)
UROBILINOGEN UR STRIP-ACNC: <2
VIT C UR-MCNC: 40 MG/DL
WBC #/AREA URNS AUTO: 272 /HPF

## 2018-09-05 PROCEDURE — 87077 CULTURE AEROBIC IDENTIFY: CPT

## 2018-09-05 PROCEDURE — 81001 URINALYSIS AUTO W/SCOPE: CPT

## 2018-09-05 PROCEDURE — 87186 SC STD MICRODIL/AGAR DIL: CPT

## 2018-09-05 PROCEDURE — 87086 URINE CULTURE/COLONY COUNT: CPT

## 2018-09-11 ENCOUNTER — TELEPHONE (OUTPATIENT)
Dept: HEMATOLOGY/ONCOLOGY | Facility: HOSPITAL | Age: 78
End: 2018-09-11

## 2018-09-11 NOTE — TELEPHONE ENCOUNTER
Sona called to speak with the nurse concerning a urine track infection. She is currently taking Sulfomethate and Anastrozole. She is concerned with a medication interaction.  Sona can be reached at [phone] Please advise

## 2018-09-11 NOTE — TELEPHONE ENCOUNTER
I returned Sona's call. She stated she has a UTI. Her PCP put her on Sulfameth--160 mg. She is taking this antibiotic twice a day x 14 days.  She wanted to make sure there was not going to be a drug to drug interaction when she starts her Anastrozole

## 2018-10-10 ENCOUNTER — LAB ENCOUNTER (OUTPATIENT)
Dept: LAB | Facility: HOSPITAL | Age: 78
End: 2018-10-10
Attending: INTERNAL MEDICINE
Payer: MEDICARE

## 2018-10-10 DIAGNOSIS — N30.90 DIVERTICULITIS OF BLADDER: Primary | ICD-10-CM

## 2018-10-10 PROCEDURE — 81001 URINALYSIS AUTO W/SCOPE: CPT

## 2018-10-10 PROCEDURE — 87086 URINE CULTURE/COLONY COUNT: CPT

## 2018-10-10 PROCEDURE — 87186 SC STD MICRODIL/AGAR DIL: CPT

## 2018-10-10 PROCEDURE — 87077 CULTURE AEROBIC IDENTIFY: CPT

## 2018-10-16 NOTE — PROGRESS NOTES
Breast Surgery Surveillance Visit    Diagnosis: Right breast cancer status post lumpectomy on August 13, 2018. Stage: [id number]    Disease Status:  Surgical treatment complete, Arimidex recommended, radiation therapy declined. History:    This [de-identified] year o CHOLANGIOPANCREATOGRAPHY (ERCP) N/A 8/2/2017    Performed by Brock Loza MD at [address] [place] [address] ENDOSCOPY   • ENDOSCOPIC RETROGRADE CHOLANGIOPANCREATOGRAPHY (ERCP) N/A 5/20/2017    Performed by Brock Loza MD at [address] [place] [address] ENDOSCOPY   • [facility]      5/21/201 the ears, ear drainage, earaches, nasal congestion, nose bleeds, snoring, pain in mouth/throat, hoarseness, change in voice, facial trauma.     Respiratory:  The patient denies chronic cough, phlegm, hemoptysis, pleurisy/chest pain, pneumonia, asthma, wheez feeling of despair. Endocrine: There is no history of poor/slow wound healing, weight loss/gain, fertility or hormone problems, cold intolerance, thyroid disease.      Allergic/Immunologic:  There is no history of hives, hay fever, angioedema or anaph

## 2018-10-17 ENCOUNTER — OFFICE VISIT (OUTPATIENT)
Dept: HEMATOLOGY/ONCOLOGY | Facility: HOSPITAL | Age: 78
End: 2018-10-17
Attending: INTERNAL MEDICINE
Payer: MEDICARE

## 2018-10-17 ENCOUNTER — HOSPITAL ENCOUNTER (EMERGENCY)
Facility: HOSPITAL | Age: 78
Discharge: HOME | End: 2018-10-17
Attending: EMERGENCY MEDICINE
Payer: MEDICARE

## 2018-10-17 ENCOUNTER — APPOINTMENT (OUTPATIENT)
Dept: CT IMAGING | Facility: HOSPITAL | Age: 78
End: 2018-10-17
Attending: EMERGENCY MEDICINE
Payer: MEDICARE

## 2018-10-17 VITALS
DIASTOLIC BLOOD PRESSURE: 72 MMHG | WEIGHT: 125 LBS | HEART RATE: 81 BPM | OXYGEN SATURATION: 100 % | BODY MASS INDEX: 22.15 KG/M2 | SYSTOLIC BLOOD PRESSURE: 151 MMHG | HEIGHT: 63 IN | TEMPERATURE: 98 F | RESPIRATION RATE: 18 BRPM

## 2018-10-17 VITALS
SYSTOLIC BLOOD PRESSURE: 128 MMHG | HEART RATE: 71 BPM | TEMPERATURE: 99 F | DIASTOLIC BLOOD PRESSURE: 57 MMHG | RESPIRATION RATE: 16 BRPM | BODY MASS INDEX: 22.32 KG/M2 | WEIGHT: 126 LBS | HEIGHT: 63 IN

## 2018-10-17 DIAGNOSIS — Z51.81 ENCOUNTER FOR MONITORING ADJUVANT HORMONAL THERAPY: ICD-10-CM

## 2018-10-17 DIAGNOSIS — N30.90 CYSTITIS: Primary | ICD-10-CM

## 2018-10-17 DIAGNOSIS — Z17.0 MALIGNANT NEOPLASM OF RIGHT BREAST IN FEMALE, ESTROGEN RECEPTOR POSITIVE, UNSPECIFIED SITE OF BREAST (HCC): Primary | ICD-10-CM

## 2018-10-17 DIAGNOSIS — Z79.899 ENCOUNTER FOR MONITORING ADJUVANT HORMONAL THERAPY: ICD-10-CM

## 2018-10-17 DIAGNOSIS — Z78.0 MENOPAUSE: ICD-10-CM

## 2018-10-17 DIAGNOSIS — C50.911 MALIGNANT NEOPLASM OF RIGHT BREAST IN FEMALE, ESTROGEN RECEPTOR POSITIVE, UNSPECIFIED SITE OF BREAST (HCC): Primary | ICD-10-CM

## 2018-10-17 DIAGNOSIS — K52.9 COLITIS: ICD-10-CM

## 2018-10-17 PROCEDURE — 96361 HYDRATE IV INFUSION ADD-ON: CPT

## 2018-10-17 PROCEDURE — 87086 URINE CULTURE/COLONY COUNT: CPT | Performed by: EMERGENCY MEDICINE

## 2018-10-17 PROCEDURE — 81001 URINALYSIS AUTO W/SCOPE: CPT | Performed by: EMERGENCY MEDICINE

## 2018-10-17 PROCEDURE — 96365 THER/PROPH/DIAG IV INF INIT: CPT

## 2018-10-17 PROCEDURE — 96375 TX/PRO/DX INJ NEW DRUG ADDON: CPT

## 2018-10-17 PROCEDURE — 85025 COMPLETE CBC W/AUTO DIFF WBC: CPT | Performed by: EMERGENCY MEDICINE

## 2018-10-17 PROCEDURE — 74176 CT ABD & PELVIS W/O CONTRAST: CPT | Performed by: EMERGENCY MEDICINE

## 2018-10-17 PROCEDURE — 99214 OFFICE O/P EST MOD 30 MIN: CPT | Performed by: INTERNAL MEDICINE

## 2018-10-17 PROCEDURE — 80048 BASIC METABOLIC PNL TOTAL CA: CPT | Performed by: EMERGENCY MEDICINE

## 2018-10-17 PROCEDURE — 99285 EMERGENCY DEPT VISIT HI MDM: CPT

## 2018-10-17 PROCEDURE — 80076 HEPATIC FUNCTION PANEL: CPT | Performed by: EMERGENCY MEDICINE

## 2018-10-17 RX ORDER — KETOROLAC TROMETHAMINE 15 MG/ML
15 INJECTION, SOLUTION INTRAMUSCULAR; INTRAVENOUS ONCE
Status: COMPLETED | OUTPATIENT
Start: 2018-10-17 | End: 2018-10-17

## 2018-10-17 NOTE — ED NOTES
Patient discharged home with son with written/verbal discharge instructions which patient verbalizes understanding. Gait steady.

## 2018-10-17 NOTE — PROGRESS NOTES
Cancer Center Progress Note    Patient Name: Josh Boss   YOB: 1940   Medical Record Number: [id number]   Attending Physician: Tiffanie Baldwin M.D.      Chief Complaint:  hormone receptor positive right breast cancer    History of Present Illn Hypertension Sister    • Heart Disorder Brother        Social History:  Social History    Socioeconomic History      Marital status:       Spouse name: Not on file      Number of children: Not on file      Years of education: Not on file      [facility] Neck: No JVD. No palpable lymphadenopathy. Neck is supple. Lymphatics: There is no palpable peripheral lymphadenopathy   Chest: Symmetric expansion, nonlabored breathing  Cardiovascular: Regular with palpable distal pulses   Abdomen: Soft, non tender. discussed with the pateint and family.          Phuong Johnson MD

## 2018-10-17 NOTE — ED INITIAL ASSESSMENT (HPI)
Pt reports having one hour of left lower quadrant pain. Denies N/V/D. States she is currently taking an antibiotic for a UTI.

## 2018-10-17 NOTE — ED PROVIDER NOTES
Patient Seen in: [facility] AND [facility] Emergency Department    History   Patient presents with:  Abdomen/Flank Pain (GI/)    Stated Complaint: LLQ abd pain     HPI    Pt is [de-identified] yo F who p/w 1-2 hours of sharp LLQ abdominal pain.  No exacerbating or relieving SpO2 98 %   O2 Device None (Room air)       Current:/85   Pulse 82   Temp 97.9 °F (36.6 °C) (Oral)   Resp 18   Ht 160 cm (5' 3\")   Wt 56.7 kg   SpO2 100%   BMI 22.14 kg/m²         Physical Exam    GENERAL: moderate painful distress, awake and aler There is a nonobstructing 12 mm diameter stone in the pelvis of the right kidney. There is periureteral/peripelvic fat stranding surrounding this stone, raising the possibility of superimposed urinary infection or a nonspecific ureteritis.   Correlate for diagnosis)  Colitis    Disposition:  Discharge  10/17/2018  4:19 pm    Follow-up:  Elyssa Lozano MD  [address]  [phone]    In 3 days      We recommend that you schedule follow up care with a primary care provider wi

## 2018-10-18 ENCOUNTER — HOSPITAL ENCOUNTER (EMERGENCY)
Facility: HOSPITAL | Age: 78
Discharge: HOME | End: 2018-10-18
Attending: EMERGENCY MEDICINE
Payer: MEDICARE

## 2018-10-18 VITALS
HEART RATE: 78 BPM | RESPIRATION RATE: 18 BRPM | DIASTOLIC BLOOD PRESSURE: 59 MMHG | SYSTOLIC BLOOD PRESSURE: 128 MMHG | TEMPERATURE: 98 F | OXYGEN SATURATION: 95 %

## 2018-10-18 DIAGNOSIS — N30.00 ACUTE CYSTITIS WITHOUT HEMATURIA: ICD-10-CM

## 2018-10-18 DIAGNOSIS — K52.9 COLITIS: Primary | ICD-10-CM

## 2018-10-18 PROCEDURE — 96372 THER/PROPH/DIAG INJ SC/IM: CPT

## 2018-10-18 PROCEDURE — 99284 EMERGENCY DEPT VISIT MOD MDM: CPT

## 2018-10-18 RX ORDER — KETOROLAC TROMETHAMINE 30 MG/ML
30 INJECTION, SOLUTION INTRAMUSCULAR; INTRAVENOUS ONCE
Status: COMPLETED | OUTPATIENT
Start: 2018-10-18 | End: 2018-10-18

## 2018-10-18 RX ORDER — POLYETHYLENE GLYCOL 3350 17 G/17G
17 POWDER, FOR SOLUTION ORAL DAILY PRN
Qty: 12 EACH | Refills: 0 | Status: ON HOLD | OUTPATIENT
Start: 2018-10-18 | End: 2018-10-22

## 2018-10-18 RX ORDER — MORPHINE SULFATE 4 MG/ML
4 INJECTION, SOLUTION INTRAMUSCULAR; INTRAVENOUS ONCE
Status: DISCONTINUED | OUTPATIENT
Start: 2018-10-18 | End: 2018-10-18

## 2018-10-18 RX ORDER — HYDROCODONE BITARTRATE AND ACETAMINOPHEN 5; 325 MG/1; MG/1
1 TABLET ORAL EVERY 4 HOURS PRN
Qty: 12 TABLET | Refills: 0 | Status: ON HOLD | OUTPATIENT
Start: 2018-10-18 | End: 2018-10-22

## 2018-10-18 NOTE — ED INITIAL ASSESSMENT (HPI)
Pt states she was here yesterday and dx with a kidney infection, was not sent home with pain medicine and is still having kidney pain.

## 2018-10-18 NOTE — ED NOTES
Patient given discharge instructions and all questions answered. Patient is dressed and ambulatory with steady gait to exit. Prescription given as well as education on new medicine therapy.

## 2018-10-18 NOTE — ED PROVIDER NOTES
Patient Seen in: [facility] AND [facility] Emergency Department    History   Patient presents with:  Urinary Symptoms (urologic)    Stated Complaint: kidney infection    HPI    57-year-old female with past medical history significant for breast cancer, anxiety, History    Tobacco Use      Smoking status: Former Smoker        Quit date:         Years since quittin.8      Smokeless tobacco: Never Used    Alcohol use: Yes      Alcohol/week: 0.6 oz      Types: 1 Glasses of wine per week    Drug use:  No periureteral/peripelvic fat stranding surrounding this stone, raising the possibility of superimposed urinary infection or a nonspecific ureteritis. Correlate for right-sided pyelonephritis.   There is an additional nonobstructing 4 mm stone in a lower josé miguel diagnosis)  Acute cystitis without hematuria    Disposition:  Discharge  10/18/2018  3:21 am    Follow-up:  Hilda Villegas MD  [address]  [phone]    In 1 day      We recommend that you schedule follow up care with a

## 2018-10-18 NOTE — ED NOTES
Resumed care of patient who was seen in the er yesterday for the same symptoms and was dx with a left sided kidney infections and given 1 dose of iv abx with instructions to continue po abx the next days with follow up w cpc.  Pt presents here today because

## 2018-10-19 ENCOUNTER — APPOINTMENT (OUTPATIENT)
Dept: GENERAL RADIOLOGY | Facility: HOSPITAL | Age: 78
End: 2018-10-19
Attending: UROLOGY
Payer: MEDICARE

## 2018-10-19 ENCOUNTER — HOSPITAL ENCOUNTER (OUTPATIENT)
Facility: HOSPITAL | Age: 78
Setting detail: OBSERVATION
Discharge: HOME | End: 2018-10-22
Attending: INTERNAL MEDICINE | Admitting: INTERNAL MEDICINE
Payer: MEDICARE

## 2018-10-19 PROCEDURE — 74018 RADEX ABDOMEN 1 VIEW: CPT | Performed by: UROLOGY

## 2018-10-19 RX ORDER — DEXTROSE, SODIUM CHLORIDE, AND POTASSIUM CHLORIDE 5; .9; .15 G/100ML; G/100ML; G/100ML
INJECTION INTRAVENOUS CONTINUOUS
Status: DISCONTINUED | OUTPATIENT
Start: 2018-10-19 | End: 2018-10-22

## 2018-10-19 RX ORDER — POLYETHYLENE GLYCOL 3350 17 G/17G
17 POWDER, FOR SOLUTION ORAL DAILY PRN
Status: DISCONTINUED | OUTPATIENT
Start: 2018-10-19 | End: 2018-10-21

## 2018-10-19 RX ORDER — METRONIDAZOLE 500 MG/1
250 TABLET ORAL 4 TIMES DAILY
Status: ON HOLD | COMMUNITY
Start: 2018-10-19 | End: 2018-10-22

## 2018-10-19 RX ORDER — CLONAZEPAM 1 MG/1
1 TABLET ORAL NIGHTLY
Status: DISCONTINUED | OUTPATIENT
Start: 2018-10-19 | End: 2018-10-22

## 2018-10-19 RX ORDER — ONDANSETRON 2 MG/ML
4 INJECTION INTRAMUSCULAR; INTRAVENOUS EVERY 6 HOURS PRN
Status: DISCONTINUED | OUTPATIENT
Start: 2018-10-19 | End: 2018-10-22

## 2018-10-19 RX ORDER — CLONAZEPAM 0.5 MG/1
0.5 TABLET ORAL DAILY
Status: DISCONTINUED | OUTPATIENT
Start: 2018-10-20 | End: 2018-10-22

## 2018-10-19 RX ORDER — METRONIDAZOLE 500 MG/100ML
500 INJECTION, SOLUTION INTRAVENOUS EVERY 8 HOURS
Status: DISCONTINUED | OUTPATIENT
Start: 2018-10-19 | End: 2018-10-21

## 2018-10-19 RX ORDER — ANASTROZOLE 1 MG/1
1 TABLET ORAL DAILY
Status: DISCONTINUED | OUTPATIENT
Start: 2018-10-20 | End: 2018-10-22

## 2018-10-19 RX ORDER — HYDROCODONE BITARTRATE AND ACETAMINOPHEN 5; 325 MG/1; MG/1
1 TABLET ORAL EVERY 6 HOURS PRN
Status: DISCONTINUED | OUTPATIENT
Start: 2018-10-19 | End: 2018-10-22

## 2018-10-19 NOTE — H&P
[facility]    PATIENT'S NAME: Asia Tovar   ATTENDING PHYSICIAN: Donald Coyne MD   PATIENT ACCOUNT#:   [id number]    LOCATION:  [address] [place] [address] RECORD #:   [id number]       YOB: 1940  ADMISSION DATE:       10 HEENT:  PERRL. Conjunctivae are nonicteric. NECK:  Supple. No lymphadenopathy or thyromegaly. Carotid with no bruits. LUNGS:  Clear. HEART:  Regular. BREASTS:  Right breast lumpectomy. ABDOMEN:  Soft, nontender. No masses.   No organomeg

## 2018-10-19 NOTE — PLAN OF CARE
Gastrointestinal    • Maintains or returns to baseline bowel function Progressing        Patient Centered Care    • Patient preferences are identified and integrated in the patient's plan of care Progressing            June was directly admitted to the hos

## 2018-10-20 PROCEDURE — 99203 OFFICE O/P NEW LOW 30 MIN: CPT | Performed by: UROLOGY

## 2018-10-20 RX ORDER — 0.9 % SODIUM CHLORIDE 0.9 %
VIAL (ML) INJECTION
Status: COMPLETED
Start: 2018-10-20 | End: 2018-10-20

## 2018-10-20 NOTE — PLAN OF CARE
ANXIETY    • Will report anxiety at manageable levels Not Progressing        COPING    • Pt/Family able to verbalize concerns and demonstrate effective coping strategies Not Progressing          DISCHARGE PLANNING    • Discharge to home or other facility w

## 2018-10-20 NOTE — CONSULTS
[facility] Urology  Report of Initial Consultation    Sona Yan Patient Status:  Observation    1940 MRN [id number]   Location [facility] 4W/SW/SE Attending Asha Griffin, *   Hosp Day # 0 PCP A Date   • BREAST LUMPECTOMY Right 8/13/2018    Performed by Julio Wilson MD at [address] [place] [address] OR   • CHOLECYSTECTOMY      6/30/2017   • COLONOSCOPY  2016   • ENDOSCOPIC RETROGRADE CHOLANGIOPANCREATOGRAPHY (ERCP) N/A 8/2/2017    Performed by Cici Macedo, and face: negative for sore throat  Respiratory: negative for pleurisy/chest pain  Cardiovascular: negative for chest pain  Gastrointestinal: positive for abdominal pain  Genitourinary:negative for dysuria, frequency and hematuria  Neurological: negative f diameter stone in the pelvis of the right kidney. There is periureteral / peripelvic fat stranding surrounding this stone, raising the possibility of superimposed urinary infection or a nonspecific ureteritis. Correlate for right-sided pyelonephritis.   The clinically. Given the increase in size of the stones since her last imaging in 2017 and the likely infectious nature of the stones, intervention for treatment may be indicated. We discussed the different treatment options including stented Rt.  Extracor

## 2018-10-21 NOTE — PROGRESS NOTES
[facility]    Progress Note    Sona Shabazz Patient Status:  Observation    1940 MRN [id number]   Location [facility] 4W/SW/SE Attending Kedar Mcneal, *   Hosp Day # 0 PCP Tanya Momin MD        Subject 10/19/2018    CREATSERUM 0.78 10/19/2018    BUN 10 10/19/2018     10/19/2018    K 4.0 10/19/2018     10/19/2018    CO2 25 10/19/2018    GLU 94 10/19/2018    CA 9.6 10/19/2018    ALB 4.2 10/17/2018    ALKPHO 83 10/17/2018    BILT 0.7 10/17/2018

## 2018-10-21 NOTE — PROGRESS NOTES
[facility]    Progress Note    Sona Boyd Patient Status:  Observation    1940 MRN [id number]   Location [facility] 4W/SW/SE Attending Jim Arnold, *   Hosp Day # 0 PCP Destiny Mohan MD        Subject (L) 10/21/2018     10/21/2018    K 3.9 10/21/2018     (H) 10/21/2018    CO2 25 10/21/2018     (H) 10/21/2018    CA 9.0 10/21/2018    ALB 3.0 (L) 10/21/2018    ALKPHO 68 10/21/2018    BILT 0.2 (L) 10/21/2018    TP 5.7 (L) 10/21/2018    AS

## 2018-10-22 VITALS
HEART RATE: 67 BPM | BODY MASS INDEX: 22.61 KG/M2 | SYSTOLIC BLOOD PRESSURE: 142 MMHG | TEMPERATURE: 98 F | OXYGEN SATURATION: 96 % | HEIGHT: 63 IN | WEIGHT: 127.63 LBS | DIASTOLIC BLOOD PRESSURE: 66 MMHG | RESPIRATION RATE: 18 BRPM

## 2018-10-22 NOTE — PLAN OF CARE
ANXIETY    • Will report anxiety at manageable levels Adequate for Discharge        COPING    • Pt/Family able to verbalize concerns and demonstrate effective coping strategies Adequate for Discharge        DISCHARGE PLANNING    • Discharge to home or othe

## 2018-10-24 NOTE — DISCHARGE SUMMARY
[facility]    PATIENT'S NAME: Tanya Malloy   ATTENDING PHYSICIAN: Keily Eric MD   PATIENT ACCOUNT#:   [id number]    LOCATION:  [address] [place] [address] RECORD #:   [id number]       YOB: 1940  ADMISSION DATE:       10

## 2018-10-29 ENCOUNTER — TELEPHONE (OUTPATIENT)
Dept: SURGERY | Facility: CLINIC | Age: 78
End: 2018-10-29

## 2018-10-29 NOTE — TELEPHONE ENCOUNTER
Pt calling to schedule hosp f/u for infected kidney stones - per Dr Ara Griffin in the [facility] 231 -  - also per PCP  Dr Kirk Ramirez - [phone]

## 2018-10-30 NOTE — TELEPHONE ENCOUNTER
Patient states spoke with PCP and was told to have Dr Sherie Da Silva contact him on his cell phone [phone]. States that per Dr Sherie Da Silva and PCP needs to have surgery asap. States had consult with Feliciano during her admission at [facility]. Please call.  Thank you

## 2018-10-30 NOTE — TELEPHONE ENCOUNTER
Per Dr. Anderson Duron 10/20/18 Hospital consult recommendations; (copied and pasted below);      2. Recommend outpatient follow-up to further discuss management of her right nephrolithiasis with likely infectious stones.      Dr. Vivi Caba, please advise when t

## 2018-10-31 NOTE — TELEPHONE ENCOUNTER
Please offer appointment on Friday 11/2 at 3 PM. Otherwise Tuesday 11/6 11:00 AM or 4:30 PM.    Thank you.  ZH

## 2018-10-31 NOTE — TELEPHONE ENCOUNTER
Called patient made appointment for 11/2/2018 @ 3 pm per Dr Terrence Gotti Patient accepted appointment

## 2018-11-02 NOTE — TELEPHONE ENCOUNTER
----- Message from Mor Sanches MD sent at 11/1/2018  6:38 PM CDT -----  Regarding: Reschedule  Can you guys please call patient and reschedule her appointment to Tuesday 11/6 at 9:00 AM?    ThanksBethany

## 2018-11-02 NOTE — TELEPHONE ENCOUNTER
Patient returned call. Informed patient that per Dr. Joe Orourke, we need to reschedule her 11/2/18 appointment to 11/6/18 at 9 am. Patient expressed concern and stated, \"I believe my stones are infected and do not want to go into sepsis. \" Patient stated th

## 2018-11-02 NOTE — TELEPHONE ENCOUNTER
----- Message from Colby Oscar MD sent at 11/1/2018  6:38 PM CDT -----  Regarding: Reschedule  Can you guys please call patient and reschedule her appointment to Tuesday 11/6 at 9:00 AM?    Thanks,  Krzysztof Smiley

## 2018-11-06 ENCOUNTER — APPOINTMENT (OUTPATIENT)
Dept: LAB | Facility: HOSPITAL | Age: 78
End: 2018-11-06
Attending: UROLOGY
Payer: MEDICARE

## 2018-11-06 ENCOUNTER — TELEPHONE (OUTPATIENT)
Dept: SURGERY | Facility: CLINIC | Age: 78
End: 2018-11-06

## 2018-11-06 ENCOUNTER — HOSPITAL ENCOUNTER (OUTPATIENT)
Dept: GENERAL RADIOLOGY | Facility: HOSPITAL | Age: 78
Discharge: HOME | End: 2018-11-06
Attending: UROLOGY | Admitting: UROLOGY
Payer: MEDICARE

## 2018-11-06 ENCOUNTER — OFFICE VISIT (OUTPATIENT)
Dept: SURGERY | Facility: CLINIC | Age: 78
End: 2018-11-06
Payer: MEDICARE

## 2018-11-06 VITALS
DIASTOLIC BLOOD PRESSURE: 64 MMHG | WEIGHT: 127 LBS | BODY MASS INDEX: 22.5 KG/M2 | SYSTOLIC BLOOD PRESSURE: 124 MMHG | HEIGHT: 63 IN

## 2018-11-06 DIAGNOSIS — Z01.818 PREOP EXAMINATION: ICD-10-CM

## 2018-11-06 DIAGNOSIS — Z79.01 MONITORING FOR ANTICOAGULANT USE: ICD-10-CM

## 2018-11-06 DIAGNOSIS — R39.9 SYMPTOM INVOLVING BLADDER: ICD-10-CM

## 2018-11-06 DIAGNOSIS — N20.0 KIDNEY STONES: Primary | ICD-10-CM

## 2018-11-06 DIAGNOSIS — R82.71 ASYMPTOMATIC BACTERIURIA: ICD-10-CM

## 2018-11-06 DIAGNOSIS — N20.0 RIGHT KIDNEY STONE: Primary | ICD-10-CM

## 2018-11-06 DIAGNOSIS — Z51.81 MONITORING FOR ANTICOAGULANT USE: ICD-10-CM

## 2018-11-06 PROCEDURE — 85730 THROMBOPLASTIN TIME PARTIAL: CPT

## 2018-11-06 PROCEDURE — 99213 OFFICE O/P EST LOW 20 MIN: CPT | Performed by: UROLOGY

## 2018-11-06 PROCEDURE — G0463 HOSPITAL OUTPT CLINIC VISIT: HCPCS | Performed by: UROLOGY

## 2018-11-06 PROCEDURE — 93005 ELECTROCARDIOGRAM TRACING: CPT

## 2018-11-06 PROCEDURE — 85610 PROTHROMBIN TIME: CPT

## 2018-11-06 PROCEDURE — 36415 COLL VENOUS BLD VENIPUNCTURE: CPT

## 2018-11-06 PROCEDURE — 81001 URINALYSIS AUTO W/SCOPE: CPT

## 2018-11-06 PROCEDURE — 93010 ELECTROCARDIOGRAM REPORT: CPT | Performed by: UROLOGY

## 2018-11-06 PROCEDURE — 71046 X-RAY EXAM CHEST 2 VIEWS: CPT | Performed by: UROLOGY

## 2018-11-06 PROCEDURE — 87086 URINE CULTURE/COLONY COUNT: CPT

## 2018-11-06 NOTE — PROGRESS NOTES
[facility] Urology  Follow-Up Visit    HPI: Sona Hall is a [de-identified]year old female presents for a follow up visit. She was seen at [address] [place] [address] on 10/20/2018.       1. Right Kidney Stones / Recurrent Asymptomatic Bacteriuria:  - Recurrent episodes of asymptomat Cardiovascular: Normal rate. Pulmonary/Chest: Effort normal.   Abdominal: Soft. Musculoskeletal: Normal range of motion. Neurological: She is alert and oriented to person, place, and time. Skin: Skin is warm and dry.    Psychiatric: Her mood appe hydroureteronephrosis.      2.  Cortical based high-density subcentimeter foci (x3) within the left kidney, most likely representing hemorrhagic/proteinaceous cysts however they are not fully characterized with this exam.  Recommend correlation with renal u

## 2018-11-06 NOTE — TELEPHONE ENCOUNTER
Patient seen in office, scheduled for cystoscopy, right double j stent placement, right extracorporeal shockwave lithotripsy. , went over labs/pre-op with patient verbalized understanding, all questions answered.

## 2018-11-12 RX ORDER — SODIUM CHLORIDE, SODIUM LACTATE, POTASSIUM CHLORIDE, CALCIUM CHLORIDE 600; 310; 30; 20 MG/100ML; MG/100ML; MG/100ML; MG/100ML
INJECTION, SOLUTION INTRAVENOUS CONTINUOUS
Status: DISCONTINUED | OUTPATIENT
Start: 2018-11-12 | End: 2018-11-12

## 2018-11-16 ENCOUNTER — ANESTHESIA EVENT (OUTPATIENT)
Dept: SURGERY | Facility: HOSPITAL | Age: 78
End: 2018-11-16
Payer: MEDICARE

## 2018-11-16 ENCOUNTER — ANESTHESIA (OUTPATIENT)
Dept: SURGERY | Facility: HOSPITAL | Age: 78
End: 2018-11-16
Payer: MEDICARE

## 2018-11-16 ENCOUNTER — HOSPITAL ENCOUNTER (OUTPATIENT)
Facility: HOSPITAL | Age: 78
Setting detail: HOSPITAL OUTPATIENT SURGERY
Discharge: HOME | End: 2018-11-16
Attending: UROLOGY | Admitting: UROLOGY
Payer: MEDICARE

## 2018-11-16 VITALS
SYSTOLIC BLOOD PRESSURE: 139 MMHG | HEIGHT: 63 IN | WEIGHT: 126 LBS | DIASTOLIC BLOOD PRESSURE: 60 MMHG | RESPIRATION RATE: 14 BRPM | HEART RATE: 67 BPM | TEMPERATURE: 98 F | OXYGEN SATURATION: 100 % | BODY MASS INDEX: 22.32 KG/M2

## 2018-11-16 DIAGNOSIS — N20.0 KIDNEY STONES: ICD-10-CM

## 2018-11-16 PROCEDURE — 0TF3XZZ FRAGMENTATION IN RIGHT KIDNEY PELVIS, EXTERNAL APPROACH: ICD-10-PCS | Performed by: UROLOGY

## 2018-11-16 PROCEDURE — 52332 CYSTOSCOPY AND TREATMENT: CPT | Performed by: UROLOGY

## 2018-11-16 PROCEDURE — 50590 FRAGMENTING OF KIDNEY STONE: CPT | Performed by: UROLOGY

## 2018-11-16 PROCEDURE — 0T768DZ DILATION OF RIGHT URETER WITH INTRALUMINAL DEVICE, VIA NATURAL OR ARTIFICIAL OPENING ENDOSCOPIC: ICD-10-PCS | Performed by: UROLOGY

## 2018-11-16 DEVICE — STENT URET 6F 24CM ULSMTH: Type: IMPLANTABLE DEVICE | Site: URETER | Status: FUNCTIONAL

## 2018-11-16 RX ORDER — PHENAZOPYRIDINE HYDROCHLORIDE 200 MG/1
200 TABLET, FILM COATED ORAL ONCE AS NEEDED
Status: CANCELLED | OUTPATIENT
Start: 2018-11-16 | End: 2018-11-16

## 2018-11-16 RX ORDER — SODIUM CHLORIDE 9 MG/ML
INJECTION, SOLUTION INTRAVENOUS CONTINUOUS
Status: DISCONTINUED | OUTPATIENT
Start: 2018-11-16 | End: 2018-11-16

## 2018-11-16 RX ORDER — CEFTRIAXONE 1 G/1
INJECTION, POWDER, FOR SOLUTION INTRAMUSCULAR; INTRAVENOUS AS NEEDED
Status: DISCONTINUED | OUTPATIENT
Start: 2018-11-16 | End: 2018-11-16 | Stop reason: SURG

## 2018-11-16 RX ORDER — HYDROCODONE BITARTRATE AND ACETAMINOPHEN 5; 325 MG/1; MG/1
2 TABLET ORAL EVERY 4 HOURS PRN
Status: CANCELLED | OUTPATIENT
Start: 2018-11-16

## 2018-11-16 RX ORDER — HYDROCODONE BITARTRATE AND ACETAMINOPHEN 5; 325 MG/1; MG/1
2 TABLET ORAL AS NEEDED
Status: DISCONTINUED | OUTPATIENT
Start: 2018-11-16 | End: 2018-11-16

## 2018-11-16 RX ORDER — EPHEDRINE SULFATE 50 MG/ML
INJECTION, SOLUTION INTRAVENOUS AS NEEDED
Status: DISCONTINUED | OUTPATIENT
Start: 2018-11-16 | End: 2018-11-16 | Stop reason: SURG

## 2018-11-16 RX ORDER — MORPHINE SULFATE 4 MG/ML
4 INJECTION, SOLUTION INTRAMUSCULAR; INTRAVENOUS EVERY 10 MIN PRN
Status: DISCONTINUED | OUTPATIENT
Start: 2018-11-16 | End: 2018-11-16

## 2018-11-16 RX ORDER — TAMSULOSIN HYDROCHLORIDE 0.4 MG/1
0.4 CAPSULE ORAL NIGHTLY
Qty: 30 CAPSULE | Refills: 0 | Status: SHIPPED | OUTPATIENT
Start: 2018-11-16 | End: 2018-12-17

## 2018-11-16 RX ORDER — ACETAMINOPHEN 500 MG
1000 TABLET ORAL ONCE
Status: COMPLETED | OUTPATIENT
Start: 2018-11-16 | End: 2018-11-16

## 2018-11-16 RX ORDER — ACETAMINOPHEN 325 MG/1
650 TABLET ORAL EVERY 4 HOURS PRN
Status: CANCELLED | OUTPATIENT
Start: 2018-11-16

## 2018-11-16 RX ORDER — ONDANSETRON 2 MG/ML
INJECTION INTRAMUSCULAR; INTRAVENOUS AS NEEDED
Status: DISCONTINUED | OUTPATIENT
Start: 2018-11-16 | End: 2018-11-16 | Stop reason: SURG

## 2018-11-16 RX ORDER — MORPHINE SULFATE 10 MG/ML
6 INJECTION, SOLUTION INTRAMUSCULAR; INTRAVENOUS EVERY 10 MIN PRN
Status: DISCONTINUED | OUTPATIENT
Start: 2018-11-16 | End: 2018-11-16

## 2018-11-16 RX ORDER — METOCLOPRAMIDE 10 MG/1
10 TABLET ORAL ONCE
Status: DISCONTINUED | OUTPATIENT
Start: 2018-11-16 | End: 2018-11-16 | Stop reason: HOSPADM

## 2018-11-16 RX ORDER — LIDOCAINE HYDROCHLORIDE 10 MG/ML
INJECTION, SOLUTION EPIDURAL; INFILTRATION; INTRACAUDAL; PERINEURAL AS NEEDED
Status: DISCONTINUED | OUTPATIENT
Start: 2018-11-16 | End: 2018-11-16 | Stop reason: SURG

## 2018-11-16 RX ORDER — SODIUM CHLORIDE, SODIUM LACTATE, POTASSIUM CHLORIDE, CALCIUM CHLORIDE 600; 310; 30; 20 MG/100ML; MG/100ML; MG/100ML; MG/100ML
INJECTION, SOLUTION INTRAVENOUS CONTINUOUS
Status: DISCONTINUED | OUTPATIENT
Start: 2018-11-16 | End: 2018-11-16

## 2018-11-16 RX ORDER — ONDANSETRON 2 MG/ML
4 INJECTION INTRAMUSCULAR; INTRAVENOUS ONCE AS NEEDED
Status: COMPLETED | OUTPATIENT
Start: 2018-11-16 | End: 2018-11-16

## 2018-11-16 RX ORDER — HYDROCODONE BITARTRATE AND ACETAMINOPHEN 5; 325 MG/1; MG/1
1 TABLET ORAL AS NEEDED
Status: DISCONTINUED | OUTPATIENT
Start: 2018-11-16 | End: 2018-11-16

## 2018-11-16 RX ORDER — FAMOTIDINE 20 MG/1
20 TABLET ORAL ONCE
Status: DISCONTINUED | OUTPATIENT
Start: 2018-11-16 | End: 2018-11-16 | Stop reason: HOSPADM

## 2018-11-16 RX ORDER — HYDROCODONE BITARTRATE AND ACETAMINOPHEN 5; 325 MG/1; MG/1
1 TABLET ORAL EVERY 4 HOURS PRN
Status: CANCELLED | OUTPATIENT
Start: 2018-11-16

## 2018-11-16 RX ORDER — NALOXONE HYDROCHLORIDE 0.4 MG/ML
80 INJECTION, SOLUTION INTRAMUSCULAR; INTRAVENOUS; SUBCUTANEOUS AS NEEDED
Status: DISCONTINUED | OUTPATIENT
Start: 2018-11-16 | End: 2018-11-16

## 2018-11-16 RX ORDER — PHENAZOPYRIDINE HYDROCHLORIDE 100 MG/1
100 TABLET, FILM COATED ORAL 3 TIMES DAILY PRN
Qty: 9 TABLET | Refills: 0 | Status: SHIPPED | OUTPATIENT
Start: 2018-11-16 | End: 2018-12-19

## 2018-11-16 RX ORDER — EPHEDRINE SULFATE 50 MG/ML
INJECTION, SOLUTION INTRAVENOUS AS NEEDED
Status: DISCONTINUED | OUTPATIENT
Start: 2018-11-16 | End: 2018-11-16

## 2018-11-16 RX ORDER — MORPHINE SULFATE 4 MG/ML
2 INJECTION, SOLUTION INTRAMUSCULAR; INTRAVENOUS EVERY 10 MIN PRN
Status: DISCONTINUED | OUTPATIENT
Start: 2018-11-16 | End: 2018-11-16

## 2018-11-16 RX ORDER — DEXAMETHASONE SODIUM PHOSPHATE 4 MG/ML
VIAL (ML) INJECTION AS NEEDED
Status: DISCONTINUED | OUTPATIENT
Start: 2018-11-16 | End: 2018-11-16 | Stop reason: SURG

## 2018-11-16 RX ADMIN — EPHEDRINE SULFATE 10 MG: 50 INJECTION, SOLUTION INTRAVENOUS at 12:41:00

## 2018-11-16 RX ADMIN — EPHEDRINE SULFATE 10 MG: 50 INJECTION, SOLUTION INTRAVENOUS at 12:45:00

## 2018-11-16 RX ADMIN — LIDOCAINE HYDROCHLORIDE 50 MG: 10 INJECTION, SOLUTION EPIDURAL; INFILTRATION; INTRACAUDAL; PERINEURAL at 12:04:00

## 2018-11-16 RX ADMIN — SODIUM CHLORIDE: 9 INJECTION, SOLUTION INTRAVENOUS at 12:00:00

## 2018-11-16 RX ADMIN — SODIUM CHLORIDE: 9 INJECTION, SOLUTION INTRAVENOUS at 13:35:00

## 2018-11-16 RX ADMIN — DEXAMETHASONE SODIUM PHOSPHATE 4 MG: 4 MG/ML VIAL (ML) INJECTION at 12:18:00

## 2018-11-16 RX ADMIN — ONDANSETRON 4 MG: 2 INJECTION INTRAMUSCULAR; INTRAVENOUS at 12:18:00

## 2018-11-16 RX ADMIN — EPHEDRINE SULFATE 10 MG: 50 INJECTION, SOLUTION INTRAVENOUS at 12:30:00

## 2018-11-16 RX ADMIN — CEFTRIAXONE 1 G: 1 INJECTION, POWDER, FOR SOLUTION INTRAMUSCULAR; INTRAVENOUS at 12:08:00

## 2018-11-16 NOTE — ANESTHESIA PROCEDURE NOTES
ANESTHESIA INTUBATION  Urgency: elective    Airway not difficult    General Information and Staff    Patient location during procedure: OR  Anesthesiologist: Rustam Hall MD  Resident/CRNA: Rommel Ramos CRNA  Performed: anesthesiologist and

## 2018-11-16 NOTE — H&P
History & Physical Examination    Patient Name: Chantelle Fowler  MRN: [id number]  CSN: [id number]  YOB: 1940    Diagnosis: Right kidney stones    Present Illness: [de-identified]year old female with right kidney stones and recurrent asymptomatic bacteriuri Comment: occasionally      SYSTEM Check if Review is Normal Check if Physical Exam is Normal If not normal, please explain:   HEENT [X] [X]    NECK & BACK [X] [X]    HEART [X] [X]    LUNGS [X] [X]    ABDOMEN [X] [X]    UROGENITAL [X] [X]    EXTREMITIES [X

## 2018-11-16 NOTE — PROGRESS NOTES
Pt missed voiding in hat, urine bloody and in good amounts. Supplies of hat, specimen container, and strainers provided to patient and instructed on use at home. Pt verbalized that she will do this x2 weeks as md ordered.

## 2018-11-16 NOTE — OPERATIVE REPORT
[facility]    Operative Note     June E Farrah Location: [facility] [id number] MRN [id number]   Admission Date 11/16/2018 Operation Date 11/16/2018   Service Urology Surgeon Deneen Gosselin, MD      Primary Surgeon: Deneen Gosselin, MD      A components. A 22 Lao rigid cystoscope was then introduced per urethral meatus and into the urinary bladder. Pan cystoscopy was performed without evidence of any tumors, masses, stones or lesions.   The bilateral ureteral orifices were identified in t stable to PACU      Disposition: Patient will be discharged home when she meets PACU criteria. She will be instructed to strain her urine for the next 2 weeks and collect any stone fragments that she passes.   Follow-up will be scheduled in 4 weeks for cys

## 2018-11-16 NOTE — ANESTHESIA PREPROCEDURE EVALUATION
Anesthesia PreOp Note    HPI:     Sona Lott is a [de-identified]year old female who presents for preoperative consultation requested by: Pa Trujillo MD    Date of Surgery: 11/16/2018    Procedure(s):  CYSTOSCOPY  CYSTOSCOPY STENT INSERTION  EXTRACORPOREAL S 5/21/2017   • HYSTERECTOMY     • LAPAROSCOPIC CHOLECYSTECTOMY N/A 6/30/2017    Performed by Ashish Barragan MD at [address] [place] [address] OR   • LUMPECTOMY RIGHT           Medications Prior to Admission:  anastrozole 1 MG Oral Tab tab Take 1 tablet (1 mg total) by file    Tobacco Use      Smoking status: Former Smoker        Quit date:         Years since quittin.8      Smokeless tobacco: Never Used    Substance and Sexual Activity      Alcohol use:  Yes        Alcohol/week: 0.6 oz        Types: 1 Glasses of (+) blood dyscrasia (anemia),   Abdominal              Anesthesia Plan:   ASA:  2  Plan:   General  Airway:  ETT and Video laryngoscope  Informed Consent Plan and Risks Discussed With:  Patient      I have informed Sona Leon and/or legal guardian o

## 2018-11-16 NOTE — ANESTHESIA POSTPROCEDURE EVALUATION
Patient: Sona Yan    Procedure Summary     Date:  11/16/18 Room / Location:  [address] [place] [address] MAIN OR 04 / [address] [place] [address] OR    Anesthesia Start:  1200 Anesthesia Stop:      Procedure:  CYSTOSCOPY (N/A ) Diagnosis:       Kidney stones      (Kidney stones [N20.0])    Frey

## 2018-11-19 ENCOUNTER — TELEPHONE (OUTPATIENT)
Dept: SURGERY | Facility: CLINIC | Age: 78
End: 2018-11-19

## 2018-11-19 NOTE — TELEPHONE ENCOUNTER
Pt called stating pt had surgery on 11-16-18. Discharge instruction to schedule appt to have stent removed in 4 weeks and to have xray. Pt wants to have xray at the hospital.    Call pt to schedule appt.

## 2018-11-20 ENCOUNTER — LAB ENCOUNTER (OUTPATIENT)
Dept: LAB | Facility: HOSPITAL | Age: 78
End: 2018-11-20
Attending: INTERNAL MEDICINE
Payer: MEDICARE

## 2018-11-20 ENCOUNTER — TELEPHONE (OUTPATIENT)
Dept: SURGERY | Facility: CLINIC | Age: 78
End: 2018-11-20

## 2018-11-20 DIAGNOSIS — N30.90 BLADDER INFECTION: Primary | ICD-10-CM

## 2018-11-20 PROCEDURE — 87086 URINE CULTURE/COLONY COUNT: CPT

## 2018-11-20 PROCEDURE — 81001 URINALYSIS AUTO W/SCOPE: CPT

## 2018-11-20 NOTE — TELEPHONE ENCOUNTER
Pt is at the  . Pt had surgery  11/16/18 for a Kidney stone infection. Pt  Was told by her PCP to do a culture due to pain. Pt  left message to get a appt and a stomach x ray. Pt stated that this needs to be done by the  12/16/18.

## 2018-11-21 ENCOUNTER — TELEPHONE (OUTPATIENT)
Dept: SURGERY | Facility: CLINIC | Age: 78
End: 2018-11-21

## 2018-11-21 NOTE — TELEPHONE ENCOUNTER
Late entry. Pt was at , asking to schedule her post procedure kub. Informed her no appt is needed for the kub, and that she only need to complete it in time frame,as instructed by .  Provided her with the location of the check in desk, at the

## 2018-11-21 NOTE — TELEPHONE ENCOUNTER
Spoke to patient. Patient states she feels pressure in the vaginal area, constant pressure; onset since her ESWL, right stent placement 1 week ago.  Patient states she been straining her urine as instructed, but has not seen any stone fragments, particles,

## 2018-11-21 NOTE — TELEPHONE ENCOUNTER
Patient requesting to speak with clinical staff. States she has some concerns over symptoms she is experiencing but would not go into detail. Requesting to speak with RN asap. Please call. Thank you.

## 2018-11-21 NOTE — TELEPHONE ENCOUNTER
Spoke to patient. Noticed patient is scheduled for office visit in 4 weeks.   Rescheduled patient for office cysto/stent removal on 12/19 @ 10:00 am.  Informed patient to have KUB 1-2 days before scheduled office cysto; MD will review KUB and determine if s

## 2018-11-27 ENCOUNTER — TELEPHONE (OUTPATIENT)
Dept: SURGERY | Facility: CLINIC | Age: 78
End: 2018-11-27

## 2018-11-27 NOTE — TELEPHONE ENCOUNTER
Pt called stating pt spoke to the rn earlier and has not received a call back. Call transferred to the rn.

## 2018-11-27 NOTE — TELEPHONE ENCOUNTER
Spoke with pt and apologized that I have not gotten a response from [facility] for her yet and that I will send the msg again. Pt now states that she expects to hear from [facility] personally by the end of the day. Pt states that she now has a dull aching pain in the Rt.

## 2018-11-27 NOTE — TELEPHONE ENCOUNTER
Spoke with pt and determined that she was not feeling well at all last week, (fatigued), and went to f/u with her PCP and despite no c/o UTI symptoms PCP sent a UA and C&S and next day prescribed Bactrim DS BID for 7 days, d/t abn UA results.  Urine C&S cam

## 2018-11-28 NOTE — TELEPHONE ENCOUNTER
Spoke to patient over the phone. She voiced her questions and concerns and I addressed them as follows:    1.  She is concerned that she hasn't passed any stone fragments yet despite straining her urine: I reassured her and explained that this sometimes miri

## 2018-12-14 ENCOUNTER — HOSPITAL ENCOUNTER (OUTPATIENT)
Dept: GENERAL RADIOLOGY | Facility: HOSPITAL | Age: 78
Discharge: HOME | End: 2018-12-14
Attending: INTERNAL MEDICINE
Payer: MEDICARE

## 2018-12-14 DIAGNOSIS — N20.0 KIDNEY STONE: ICD-10-CM

## 2018-12-14 PROCEDURE — 74018 RADEX ABDOMEN 1 VIEW: CPT | Performed by: INTERNAL MEDICINE

## 2018-12-18 RX ORDER — TAMSULOSIN HYDROCHLORIDE 0.4 MG/1
CAPSULE ORAL
Qty: 30 CAPSULE | Refills: 0 | Status: SHIPPED | OUTPATIENT
Start: 2018-12-18 | End: 2018-12-19

## 2018-12-19 ENCOUNTER — OFFICE VISIT (OUTPATIENT)
Dept: SURGERY | Facility: CLINIC | Age: 78
End: 2018-12-19
Payer: MEDICARE

## 2018-12-19 VITALS
BODY MASS INDEX: 22.32 KG/M2 | TEMPERATURE: 98 F | HEART RATE: 66 BPM | HEIGHT: 63 IN | SYSTOLIC BLOOD PRESSURE: 146 MMHG | WEIGHT: 126 LBS | DIASTOLIC BLOOD PRESSURE: 67 MMHG

## 2018-12-19 DIAGNOSIS — N20.0 RIGHT KIDNEY STONE: Primary | ICD-10-CM

## 2018-12-19 DIAGNOSIS — R82.71 ASYMPTOMATIC BACTERIURIA: ICD-10-CM

## 2018-12-19 NOTE — PROGRESS NOTES
[facility] Urology  Follow-Up Visit    HPI: Sona Sheba Salvador is a [de-identified]year old female presents for a postoperative follow up visit. She is status post cystoscopy with right JJ stent placement and ESWL performed at [facility] on 11/16/2018.     1. Right Kidney S time.   Skin: Skin is warm and dry. Psychiatric: Her mood appears anxious. LABS:  No results found    IMAGING:    XR Abdomen KUB (12/14/2018) which I personally reviewed:    1. Status post right ureteral stent insertion.   2. Multiple stone fragments

## 2018-12-20 ENCOUNTER — TELEPHONE (OUTPATIENT)
Dept: SURGERY | Facility: CLINIC | Age: 78
End: 2018-12-20

## 2018-12-20 DIAGNOSIS — N20.0 RENAL CALCULUS, RIGHT: Primary | ICD-10-CM

## 2018-12-21 ENCOUNTER — TELEPHONE (OUTPATIENT)
Dept: SURGERY | Facility: CLINIC | Age: 78
End: 2018-12-21

## 2018-12-21 ENCOUNTER — LAB ENCOUNTER (OUTPATIENT)
Dept: LAB | Facility: HOSPITAL | Age: 78
End: 2018-12-21
Attending: INTERNAL MEDICINE
Payer: MEDICARE

## 2018-12-21 DIAGNOSIS — D64.9 ANEMIA: Primary | ICD-10-CM

## 2018-12-21 PROCEDURE — 85025 COMPLETE CBC W/AUTO DIFF WBC: CPT

## 2018-12-21 PROCEDURE — 36415 COLL VENOUS BLD VENIPUNCTURE: CPT

## 2018-12-26 RX ORDER — TAMSULOSIN HYDROCHLORIDE 0.4 MG/1
0.4 CAPSULE ORAL NIGHTLY
COMMUNITY
End: 2019-01-17

## 2019-01-04 ENCOUNTER — ANESTHESIA (OUTPATIENT)
Dept: SURGERY | Facility: HOSPITAL | Age: 79
End: 2019-01-04
Payer: MEDICARE

## 2019-01-04 ENCOUNTER — ANESTHESIA EVENT (OUTPATIENT)
Dept: SURGERY | Facility: HOSPITAL | Age: 79
End: 2019-01-04
Payer: MEDICARE

## 2019-01-04 ENCOUNTER — APPOINTMENT (OUTPATIENT)
Dept: GENERAL RADIOLOGY | Facility: HOSPITAL | Age: 79
End: 2019-01-04
Attending: UROLOGY
Payer: MEDICARE

## 2019-01-04 ENCOUNTER — HOSPITAL ENCOUNTER (OUTPATIENT)
Facility: HOSPITAL | Age: 79
Setting detail: HOSPITAL OUTPATIENT SURGERY
Discharge: HOME | End: 2019-01-04
Attending: UROLOGY | Admitting: UROLOGY
Payer: MEDICARE

## 2019-01-04 VITALS
WEIGHT: 126 LBS | TEMPERATURE: 97 F | BODY MASS INDEX: 22.32 KG/M2 | HEIGHT: 63 IN | SYSTOLIC BLOOD PRESSURE: 123 MMHG | RESPIRATION RATE: 15 BRPM | DIASTOLIC BLOOD PRESSURE: 62 MMHG | HEART RATE: 84 BPM | OXYGEN SATURATION: 99 %

## 2019-01-04 DIAGNOSIS — N20.0 KIDNEY STONE: Primary | ICD-10-CM

## 2019-01-04 DIAGNOSIS — N20.0 MULTIPLE KIDNEY STONES: Primary | ICD-10-CM

## 2019-01-04 DIAGNOSIS — N20.0 RIGHT KIDNEY STONE: ICD-10-CM

## 2019-01-04 PROCEDURE — 0TP98DZ REMOVAL OF INTRALUMINAL DEVICE FROM URETER, VIA NATURAL OR ARTIFICIAL OPENING ENDOSCOPIC: ICD-10-PCS | Performed by: UROLOGY

## 2019-01-04 PROCEDURE — 0TF38ZZ FRAGMENTATION IN RIGHT KIDNEY PELVIS, VIA NATURAL OR ARTIFICIAL OPENING ENDOSCOPIC: ICD-10-PCS | Performed by: UROLOGY

## 2019-01-04 PROCEDURE — 0T768DZ DILATION OF RIGHT URETER WITH INTRALUMINAL DEVICE, VIA NATURAL OR ARTIFICIAL OPENING ENDOSCOPIC: ICD-10-PCS | Performed by: UROLOGY

## 2019-01-04 PROCEDURE — BT1D1ZZ FLUOROSCOPY OF RIGHT KIDNEY, URETER AND BLADDER USING LOW OSMOLAR CONTRAST: ICD-10-PCS | Performed by: UROLOGY

## 2019-01-04 PROCEDURE — 52356 CYSTO/URETERO W/LITHOTRIPSY: CPT | Performed by: UROLOGY

## 2019-01-04 PROCEDURE — 82365 CALCULUS SPECTROSCOPY: CPT | Performed by: UROLOGY

## 2019-01-04 PROCEDURE — 0TC68ZZ EXTIRPATION OF MATTER FROM RIGHT URETER, VIA NATURAL OR ARTIFICIAL OPENING ENDOSCOPIC: ICD-10-PCS | Performed by: UROLOGY

## 2019-01-04 DEVICE — STENT URET 6F 24CM ULSMTH: Type: IMPLANTABLE DEVICE | Site: URETER | Status: FUNCTIONAL

## 2019-01-04 RX ORDER — SODIUM CHLORIDE, SODIUM LACTATE, POTASSIUM CHLORIDE, CALCIUM CHLORIDE 600; 310; 30; 20 MG/100ML; MG/100ML; MG/100ML; MG/100ML
INJECTION, SOLUTION INTRAVENOUS CONTINUOUS
Status: DISCONTINUED | OUTPATIENT
Start: 2019-01-04 | End: 2019-01-04

## 2019-01-04 RX ORDER — HALOPERIDOL 5 MG/ML
0.25 INJECTION INTRAMUSCULAR ONCE AS NEEDED
Status: DISCONTINUED | OUTPATIENT
Start: 2019-01-04 | End: 2019-01-04

## 2019-01-04 RX ORDER — ACETAMINOPHEN 325 MG/1
650 TABLET ORAL EVERY 4 HOURS PRN
Status: DISCONTINUED | OUTPATIENT
Start: 2019-01-04 | End: 2019-01-04

## 2019-01-04 RX ORDER — ROCURONIUM BROMIDE 10 MG/ML
INJECTION, SOLUTION INTRAVENOUS AS NEEDED
Status: DISCONTINUED | OUTPATIENT
Start: 2019-01-04 | End: 2019-01-04 | Stop reason: SURG

## 2019-01-04 RX ORDER — FAMOTIDINE 20 MG/1
20 TABLET ORAL ONCE
Status: DISCONTINUED | OUTPATIENT
Start: 2019-01-04 | End: 2019-01-04 | Stop reason: HOSPADM

## 2019-01-04 RX ORDER — HYDROCODONE BITARTRATE AND ACETAMINOPHEN 5; 325 MG/1; MG/1
2 TABLET ORAL AS NEEDED
Status: DISCONTINUED | OUTPATIENT
Start: 2019-01-04 | End: 2019-01-04

## 2019-01-04 RX ORDER — NALOXONE HYDROCHLORIDE 0.4 MG/ML
80 INJECTION, SOLUTION INTRAMUSCULAR; INTRAVENOUS; SUBCUTANEOUS AS NEEDED
Status: DISCONTINUED | OUTPATIENT
Start: 2019-01-04 | End: 2019-01-04

## 2019-01-04 RX ORDER — HYDROCODONE BITARTRATE AND ACETAMINOPHEN 5; 325 MG/1; MG/1
1 TABLET ORAL EVERY 4 HOURS PRN
Status: DISCONTINUED | OUTPATIENT
Start: 2019-01-04 | End: 2019-01-04

## 2019-01-04 RX ORDER — METOCLOPRAMIDE 10 MG/1
10 TABLET ORAL ONCE
Status: DISCONTINUED | OUTPATIENT
Start: 2019-01-04 | End: 2019-01-04 | Stop reason: HOSPADM

## 2019-01-04 RX ORDER — ACETAMINOPHEN 500 MG
1000 TABLET ORAL ONCE
Status: COMPLETED | OUTPATIENT
Start: 2019-01-04 | End: 2019-01-04

## 2019-01-04 RX ORDER — DEXAMETHASONE SODIUM PHOSPHATE 4 MG/ML
VIAL (ML) INJECTION AS NEEDED
Status: DISCONTINUED | OUTPATIENT
Start: 2019-01-04 | End: 2019-01-04 | Stop reason: SURG

## 2019-01-04 RX ORDER — CEFAZOLIN SODIUM/WATER 2 G/20 ML
2 SYRINGE (ML) INTRAVENOUS ONCE
Status: COMPLETED | OUTPATIENT
Start: 2019-01-04 | End: 2019-01-04

## 2019-01-04 RX ORDER — ONDANSETRON 2 MG/ML
INJECTION INTRAMUSCULAR; INTRAVENOUS AS NEEDED
Status: DISCONTINUED | OUTPATIENT
Start: 2019-01-04 | End: 2019-01-04 | Stop reason: SURG

## 2019-01-04 RX ORDER — SENNA AND DOCUSATE SODIUM 50; 8.6 MG/1; MG/1
2 TABLET, FILM COATED ORAL NIGHTLY PRN
Qty: 14 TABLET | Refills: 0 | Status: SHIPPED | OUTPATIENT
Start: 2019-01-04 | End: 2019-01-17

## 2019-01-04 RX ORDER — NEOSTIGMINE METHYLSULFATE 0.5 MG/ML
INJECTION INTRAVENOUS AS NEEDED
Status: DISCONTINUED | OUTPATIENT
Start: 2019-01-04 | End: 2019-01-04 | Stop reason: SURG

## 2019-01-04 RX ORDER — HYDROCODONE BITARTRATE AND ACETAMINOPHEN 5; 325 MG/1; MG/1
1 TABLET ORAL AS NEEDED
Status: DISCONTINUED | OUTPATIENT
Start: 2019-01-04 | End: 2019-01-04

## 2019-01-04 RX ORDER — LIDOCAINE HYDROCHLORIDE 10 MG/ML
INJECTION, SOLUTION EPIDURAL; INFILTRATION; INTRACAUDAL; PERINEURAL AS NEEDED
Status: DISCONTINUED | OUTPATIENT
Start: 2019-01-04 | End: 2019-01-04 | Stop reason: SURG

## 2019-01-04 RX ORDER — PHENAZOPYRIDINE HYDROCHLORIDE 200 MG/1
200 TABLET, FILM COATED ORAL ONCE AS NEEDED
Status: COMPLETED | OUTPATIENT
Start: 2019-01-04 | End: 2019-01-04

## 2019-01-04 RX ORDER — GLYCOPYRROLATE 0.2 MG/ML
INJECTION INTRAMUSCULAR; INTRAVENOUS AS NEEDED
Status: DISCONTINUED | OUTPATIENT
Start: 2019-01-04 | End: 2019-01-04 | Stop reason: SURG

## 2019-01-04 RX ORDER — PHENAZOPYRIDINE HYDROCHLORIDE 100 MG/1
100 TABLET, FILM COATED ORAL 3 TIMES DAILY PRN
Qty: 12 TABLET | Refills: 0 | Status: SHIPPED | OUTPATIENT
Start: 2019-01-04 | End: 2019-01-17

## 2019-01-04 RX ORDER — SULFAMETHOXAZOLE AND TRIMETHOPRIM 800; 160 MG/1; MG/1
1 TABLET ORAL 2 TIMES DAILY
Qty: 6 TABLET | Refills: 0 | Status: SHIPPED | OUTPATIENT
Start: 2019-01-04 | End: 2019-01-07

## 2019-01-04 RX ORDER — ONDANSETRON 2 MG/ML
4 INJECTION INTRAMUSCULAR; INTRAVENOUS ONCE AS NEEDED
Status: DISCONTINUED | OUTPATIENT
Start: 2019-01-04 | End: 2019-01-04

## 2019-01-04 RX ORDER — EPHEDRINE SULFATE 50 MG/ML
INJECTION, SOLUTION INTRAVENOUS AS NEEDED
Status: DISCONTINUED | OUTPATIENT
Start: 2019-01-04 | End: 2019-01-04 | Stop reason: SURG

## 2019-01-04 RX ORDER — HYDROCODONE BITARTRATE AND ACETAMINOPHEN 5; 325 MG/1; MG/1
2 TABLET ORAL EVERY 4 HOURS PRN
Status: DISCONTINUED | OUTPATIENT
Start: 2019-01-04 | End: 2019-01-04

## 2019-01-04 RX ADMIN — CEFAZOLIN SODIUM/WATER 2 G: 2 G/20 ML SYRINGE (ML) INTRAVENOUS at 10:44:00

## 2019-01-04 RX ADMIN — EPHEDRINE SULFATE 10 MG: 50 INJECTION, SOLUTION INTRAVENOUS at 10:45:00

## 2019-01-04 RX ADMIN — GLYCOPYRROLATE 0.6 MG: 0.2 INJECTION INTRAMUSCULAR; INTRAVENOUS at 12:40:00

## 2019-01-04 RX ADMIN — ROCURONIUM BROMIDE 40 MG: 10 INJECTION, SOLUTION INTRAVENOUS at 10:43:00

## 2019-01-04 RX ADMIN — DEXAMETHASONE SODIUM PHOSPHATE 4 MG: 4 MG/ML VIAL (ML) INJECTION at 10:43:00

## 2019-01-04 RX ADMIN — SODIUM CHLORIDE, SODIUM LACTATE, POTASSIUM CHLORIDE, CALCIUM CHLORIDE: 600; 310; 30; 20 INJECTION, SOLUTION INTRAVENOUS at 12:45:00

## 2019-01-04 RX ADMIN — ONDANSETRON 4 MG: 2 INJECTION INTRAMUSCULAR; INTRAVENOUS at 10:43:00

## 2019-01-04 RX ADMIN — SODIUM CHLORIDE, SODIUM LACTATE, POTASSIUM CHLORIDE, CALCIUM CHLORIDE: 600; 310; 30; 20 INJECTION, SOLUTION INTRAVENOUS at 10:43:00

## 2019-01-04 RX ADMIN — NEOSTIGMINE METHYLSULFATE 3 MG: 0.5 INJECTION INTRAVENOUS at 12:40:00

## 2019-01-04 RX ADMIN — LIDOCAINE HYDROCHLORIDE 50 MG: 10 INJECTION, SOLUTION EPIDURAL; INFILTRATION; INTRACAUDAL; PERINEURAL at 10:43:00

## 2019-01-04 NOTE — ANESTHESIA PREPROCEDURE EVALUATION
Anesthesia PreOp Note    HPI:     Sona Iglesias is a [de-identified]year old female who presents for preoperative consultation requested by: Becky Guidry MD    Date of Surgery: 1/4/2019    Procedure(s):  CYSTOSCOPY URETEROSCOPY  LASER HOLMIUM LITHOTRIPSY  CYSTOS CHOLANGIOPANCREATOGRAPHY (ERCP) N/A 8/2/2017    Performed by Leatha Rawls MD at [address] [place] [address] ENDOSCOPY   • ENDOSCOPIC RETROGRADE CHOLANGIOPANCREATOGRAPHY (ERCP) N/A 5/20/2017    Performed by Leatha Rawls MD at [address] [place] [address] ENDOSCOPY   • [facility]      5/21/201 Hypertension Sister    • Heart Disorder Brother      Social History    Socioeconomic History      Marital status:        Spouse name: Not on file      Number of children: Not on file      Years of education: Not on file      Highest education level: Weight: 55.8 kg (123 lb) 57.2 kg (126 lb)   Height: 1.6 m (5' 3\") 1.6 m (5' 3\")        Anesthesia ROS/Med Hx and Physical Exam     Patient summary reviewed and Nursing notes reviewed    Airway   Mallampati: II  TM distance: <3 FB  Neck ROM: limited  Regi Potts

## 2019-01-04 NOTE — ANESTHESIA PROCEDURE NOTES
ANESTHESIA INTUBATION  Date/Time: 1/4/2019 10:50 AM  Urgency: elective    Difficult airway    General Information and Staff    Patient location during procedure: OR  Anesthesiologist: Joseph Galvez MD  Performed: anesthesiologist     Indications and Mary

## 2019-01-04 NOTE — OPERATIVE REPORT
[facility]    Operative Note     June E Cristal Peeling Location: [facility] [id number] MRN [id number]   Admission Date 1/4/2019 Operation Date 1/4/2019   Service Urology Surgeon Jose Quinonez MD      Primary Surgeon: MD Shavon Roman anesthesia was achieved. Perioperative Ancef and gentamicin were administered IV for prophylaxis. The patient was then positioned in dorsal lithotomy with her legs in [address] [place] [address] and all pressure areas and bony prominences padded appropriately.   Surgi lower pole calyces of the kidney. No evidence of tumors, masses or lesions were noted. A 200 µm fiber was then introduced through the ureteroscope and holmium laser lithotripsy with pop-corning was performed of all residual big fragments.   All residual f criteria. Prescriptions provided for Pyridium as needed, Bactrim DS p.o. twice daily x 3 days, Ultracet as needed and Senokot-S as needed. Patient will increase her fluid intake and strain her urine.   She will be instructed on inversion therapy by my off

## 2019-01-04 NOTE — ANESTHESIA POSTPROCEDURE EVALUATION
Patient: Sona García    Procedure Summary     Date:  01/04/19 Room / Location:  [facility] OR  / [facility] OR    Anesthesia Start:  5720 Anesthesia Stop:      Procedure:  CYSTOSCOPY URETEROSCOPY (Right ) Diagnosis:  (Kidney stone)    Surgeon:  Gill Salazar

## 2019-01-04 NOTE — H&P
History & Physical Examination    Patient Name: Almaz Wilde  MRN: [id number]  CSN: [id number]  YOB: 1940    Diagnosis: right kidney stones    Present Illness: [de-identified]year old female with recurrent asymptomatic bacteriuria and a 15 x 11 mm right FRAGMENTING OF KIDNEY STONE Right 11/16/2018    Rt.  ESWL with pre-stenting, Dr. Ara Griffin @ [facility]   • HYSTERECTOMY     • LAPAROSCOPIC CHOLECYSTECTOMY N/A 6/30/2017    Performed by Kaila Ross MD at [facility] OR   • LUMPECTOMY RIGHT       Family Hist

## 2019-01-06 LAB — CALCULI MASS: 162 MG

## 2019-01-09 ENCOUNTER — TELEPHONE (OUTPATIENT)
Dept: SURGERY | Facility: CLINIC | Age: 79
End: 2019-01-09

## 2019-01-09 NOTE — TELEPHONE ENCOUNTER
Patient contacted. Patient states her biggest concern after her second cysto, right ureteroscopy with laser lithotripsy, is that she is not collecting or passing any stone fragments, or if any, it is very little.   Patient states she cannot do inversions e

## 2019-01-09 NOTE — TELEPHONE ENCOUNTER
Pt had sx on 1/4, pt states bleeding has subsided but has concerns she wants to address with RN. Pls call thank you.

## 2019-01-09 NOTE — TELEPHONE ENCOUNTER
Patient contacted. Relayed Dr. Camryn Tsang message below to the patient. Patient verbalized understanding. All questions answered.

## 2019-01-15 ENCOUNTER — HOSPITAL ENCOUNTER (OUTPATIENT)
Dept: GENERAL RADIOLOGY | Facility: HOSPITAL | Age: 79
Discharge: HOME | End: 2019-01-15
Attending: UROLOGY
Payer: MEDICARE

## 2019-01-15 DIAGNOSIS — N20.0 MULTIPLE KIDNEY STONES: ICD-10-CM

## 2019-01-15 DIAGNOSIS — N20.0 RIGHT KIDNEY STONE: ICD-10-CM

## 2019-01-15 PROCEDURE — 74018 RADEX ABDOMEN 1 VIEW: CPT | Performed by: UROLOGY

## 2019-01-17 ENCOUNTER — OFFICE VISIT (OUTPATIENT)
Dept: SURGERY | Facility: CLINIC | Age: 79
End: 2019-01-17
Payer: MEDICARE

## 2019-01-17 VITALS
HEART RATE: 67 BPM | DIASTOLIC BLOOD PRESSURE: 72 MMHG | SYSTOLIC BLOOD PRESSURE: 126 MMHG | WEIGHT: 126 LBS | BODY MASS INDEX: 22 KG/M2

## 2019-01-17 DIAGNOSIS — Z96.0 RETAINED URETERAL STENT: Primary | ICD-10-CM

## 2019-01-17 DIAGNOSIS — N20.0 RIGHT KIDNEY STONE: ICD-10-CM

## 2019-01-17 PROCEDURE — 52310 CYSTOSCOPY AND TREATMENT: CPT | Performed by: UROLOGY

## 2019-01-17 PROCEDURE — 99024 POSTOP FOLLOW-UP VISIT: CPT | Performed by: UROLOGY

## 2019-01-17 RX ORDER — CIPROFLOXACIN 500 MG/1
500 TABLET, FILM COATED ORAL ONCE
Status: DISCONTINUED | OUTPATIENT
Start: 2019-01-17 | End: 2021-01-07

## 2019-01-17 NOTE — PROGRESS NOTES
[facility] Urology  Follow-Up Visit    HPI: Sona Pérez is a [de-identified]year old female presents for a postoperative follow up visit.   She is status post cystoscopy with right ureteroscopy, laser lithotripsy, stone basketing and JJ stent exchange performed exercises. Has not passed many stone fragments. Here for JJ stent removal 1/17/2019. Reviewed past medical, surgical, family, and social history. Reviewed med list and allergies.     REVIEW OF SYSTEMS:  As per HPI    EXAM:  /72 (BP Location: [place] Findings and impression with the patient. She is expected to continue passing stone fragments over the next few months. She was instructed to increase her water intake with a goal urine output of 2-3 L of clear see-through urine per day.     I would like

## 2019-01-17 NOTE — PROCEDURES
Sandra Ocampo  : 1940  Referring Physician: Kaycee Brown MD     Patient presents with:  Kidney Problem: patient presents for cysto right stent removal      CYSTOURETHROSCOPY WITH STENT REMOVAL    Anesthesia:  2% lidocaine gel    Urethra:

## 2019-02-19 ENCOUNTER — HOSPITAL ENCOUNTER (OUTPATIENT)
Dept: BONE DENSITY | Facility: HOSPITAL | Age: 79
Discharge: HOME | End: 2019-02-19
Attending: INTERNAL MEDICINE
Payer: MEDICARE

## 2019-02-19 DIAGNOSIS — Z78.0 MENOPAUSE: ICD-10-CM

## 2019-02-19 PROCEDURE — 77080 DXA BONE DENSITY AXIAL: CPT | Performed by: INTERNAL MEDICINE

## 2019-02-20 ENCOUNTER — APPOINTMENT (OUTPATIENT)
Dept: HEMATOLOGY/ONCOLOGY | Facility: HOSPITAL | Age: 79
End: 2019-02-20
Payer: MEDICARE

## 2019-02-25 NOTE — PROGRESS NOTES
Breast Surgery Surveillance    Diagnosis: Right breast cancer status post lumpectomy on August 13, 2018.     Stage: Cancer Staging  Malignant neoplasm of right breast in female, estrogen receptor positive ([facility] [address]  Staging form: Breast, AJCC 8 kidney    • Cancer [facility]) 08/2018    right breast Ca   • Colitis     completed antibiotic therapy for C. Diff colitis   • Colon polyps    • Depression    • High cholesterol    • Incontinence    • Other general symptoms(780.99)     torn rotator cuff left arm metRONIDAZOLE 250 MG Oral Tab TK 1 T PO QID Disp:  Rfl: 0   Sertraline HCl 25 MG Oral Tab TK ONE T PO D Disp:  Rfl: 1   anastrozole 1 MG Oral Tab tab Take 1 tablet (1 mg total) by mouth daily.  Disp: 90 tablet Rfl: 3   atorvastatin 20 MG Oral Tab TK 1 T P Cardiovascular: There is no history of chest pain, chest pressure/discomfort, palpitations, irregular heartbeat, fainting or near-fainting, difficulty breathing when lying flat, SOB/Coughing at night, swelling of the legs or chest pain while walking. Physical Examination: This is a well-nourished, alert and oriented woman. There is not palpable cervical, supraclavicular or axillary adenopathy. The neck is supple with a midline trachea and no thyromegaly.  Range of motion is good at both shoulders

## 2019-02-27 ENCOUNTER — OFFICE VISIT (OUTPATIENT)
Dept: SURGERY | Facility: CLINIC | Age: 79
End: 2019-02-27
Payer: MEDICARE

## 2019-02-27 ENCOUNTER — OFFICE VISIT (OUTPATIENT)
Dept: HEMATOLOGY/ONCOLOGY | Facility: HOSPITAL | Age: 79
End: 2019-02-27
Attending: INTERNAL MEDICINE
Payer: MEDICARE

## 2019-02-27 ENCOUNTER — HOSPITAL ENCOUNTER (OUTPATIENT)
Dept: MAMMOGRAPHY | Facility: HOSPITAL | Age: 79
Discharge: HOME | End: 2019-02-27
Attending: SURGERY
Payer: MEDICARE

## 2019-02-27 VITALS
HEIGHT: 63 IN | RESPIRATION RATE: 16 BRPM | SYSTOLIC BLOOD PRESSURE: 145 MMHG | WEIGHT: 127 LBS | DIASTOLIC BLOOD PRESSURE: 67 MMHG | HEART RATE: 63 BPM | BODY MASS INDEX: 22.5 KG/M2

## 2019-02-27 VITALS
SYSTOLIC BLOOD PRESSURE: 145 MMHG | DIASTOLIC BLOOD PRESSURE: 67 MMHG | RESPIRATION RATE: 16 BRPM | OXYGEN SATURATION: 99 % | HEART RATE: 63 BPM

## 2019-02-27 DIAGNOSIS — Z17.0 MALIGNANT NEOPLASM OF RIGHT BREAST IN FEMALE, ESTROGEN RECEPTOR POSITIVE, UNSPECIFIED SITE OF BREAST (HCC): Primary | ICD-10-CM

## 2019-02-27 DIAGNOSIS — Z51.81 ENCOUNTER FOR MONITORING ADJUVANT HORMONAL THERAPY: ICD-10-CM

## 2019-02-27 DIAGNOSIS — Z17.0 MALIGNANT NEOPLASM OF UPPER-OUTER QUADRANT OF RIGHT BREAST IN FEMALE, ESTROGEN RECEPTOR POSITIVE (HCC): Primary | ICD-10-CM

## 2019-02-27 DIAGNOSIS — Z79.899 ENCOUNTER FOR MONITORING ADJUVANT HORMONAL THERAPY: ICD-10-CM

## 2019-02-27 DIAGNOSIS — M81.0 OSTEOPOROSIS, UNSPECIFIED OSTEOPOROSIS TYPE, UNSPECIFIED PATHOLOGICAL FRACTURE PRESENCE: ICD-10-CM

## 2019-02-27 DIAGNOSIS — C50.911 MALIGNANT NEOPLASM OF RIGHT BREAST IN FEMALE, ESTROGEN RECEPTOR POSITIVE, UNSPECIFIED SITE OF BREAST (HCC): Primary | ICD-10-CM

## 2019-02-27 DIAGNOSIS — C50.411 MALIGNANT NEOPLASM OF UPPER-OUTER QUADRANT OF RIGHT BREAST IN FEMALE, ESTROGEN RECEPTOR POSITIVE (HCC): Primary | ICD-10-CM

## 2019-02-27 DIAGNOSIS — C50.411 MALIGNANT NEOPLASM OF UPPER-OUTER QUADRANT OF RIGHT BREAST IN FEMALE, ESTROGEN RECEPTOR POSITIVE (HCC): ICD-10-CM

## 2019-02-27 DIAGNOSIS — Z17.0 MALIGNANT NEOPLASM OF UPPER-OUTER QUADRANT OF RIGHT BREAST IN FEMALE, ESTROGEN RECEPTOR POSITIVE (HCC): ICD-10-CM

## 2019-02-27 PROCEDURE — 77065 DX MAMMO INCL CAD UNI: CPT | Performed by: SURGERY

## 2019-02-27 PROCEDURE — 77061 BREAST TOMOSYNTHESIS UNI: CPT | Performed by: SURGERY

## 2019-02-27 PROCEDURE — 99214 OFFICE O/P EST MOD 30 MIN: CPT | Performed by: INTERNAL MEDICINE

## 2019-02-27 PROCEDURE — 99214 OFFICE O/P EST MOD 30 MIN: CPT | Performed by: SURGERY

## 2019-02-27 RX ORDER — SERTRALINE HYDROCHLORIDE 25 MG/1
TABLET, FILM COATED ORAL
Refills: 1 | COMMUNITY
Start: 2019-02-16 | End: 2019-08-28 | Stop reason: ALTCHOICE

## 2019-02-27 RX ORDER — SENNOSIDES 8.6 MG
TABLET ORAL
Refills: 0 | COMMUNITY
Start: 2019-01-04 | End: 2019-08-28 | Stop reason: ALTCHOICE

## 2019-02-27 RX ORDER — METRONIDAZOLE 250 MG/1
TABLET ORAL
Refills: 0 | COMMUNITY
Start: 2018-10-18 | End: 2019-08-28 | Stop reason: ALTCHOICE

## 2019-02-27 NOTE — PROGRESS NOTES
Cancer Center Progress Note    Patient Name: Marlen Jones   YOB: 1940   Medical Record Number: [id number]   Attending Physician: Sybil Contreras M.D.      Chief Complaint:  hormone receptor positive right breast cancer    History of Present Illn 8/2/2017    Performed by Nina Brar MD at [address] [place] [address] ENDOSCOPY   • ENDOSCOPIC RETROGRADE CHOLANGIOPANCREATOGRAPHY (ERCP) N/A 5/20/2017    Performed by Nina Brar MD at [address] [place] [address] ENDOSCOPY   • ERCP,DIAGNOSTIC      5/21/2017   • FRAGMENTING OF KIDNEY STONE Ri Relationship status: Not on file      Intimate partner violence:        Fear of current or ex partner: Not on file        Emotionally abused: Not on file        Physically abused: Not on file        Forced sexual activity: Not on file    Other Topics Date     (H) 10/21/2018    BUN 5 (L) 10/21/2018    BUNCREA 9.1 (L) 10/21/2018    CREATSERUM 0.55 10/21/2018    ANIONGAP 3 10/21/2018    GFRNAA >60 10/21/2018    GFRAA >60 10/21/2018    CA 9.0 10/21/2018    OSMOCALC 290 10/21/2018    ALKPHO 68 10/21/

## 2019-03-01 ENCOUNTER — HOSPITAL ENCOUNTER (OUTPATIENT)
Dept: GENERAL RADIOLOGY | Facility: HOSPITAL | Age: 79
Discharge: HOME | End: 2019-03-01
Attending: ORTHOPAEDIC SURGERY
Payer: MEDICARE

## 2019-03-01 ENCOUNTER — HOSPITAL ENCOUNTER (OUTPATIENT)
Dept: GENERAL RADIOLOGY | Facility: HOSPITAL | Age: 79
Discharge: HOME | End: 2019-03-01
Attending: ORTHOPAEDIC SURGERY | Admitting: ORTHOPAEDIC SURGERY
Payer: MEDICARE

## 2019-03-01 ENCOUNTER — OFFICE VISIT (OUTPATIENT)
Dept: ORTHOPEDICS CLINIC | Facility: CLINIC | Age: 79
End: 2019-03-01
Payer: MEDICARE

## 2019-03-01 VITALS — HEART RATE: 69 BPM | SYSTOLIC BLOOD PRESSURE: 128 MMHG | DIASTOLIC BLOOD PRESSURE: 73 MMHG | RESPIRATION RATE: 14 BRPM

## 2019-03-01 DIAGNOSIS — M25.512 BILATERAL SHOULDER PAIN, UNSPECIFIED CHRONICITY: ICD-10-CM

## 2019-03-01 DIAGNOSIS — M25.511 BILATERAL SHOULDER PAIN, UNSPECIFIED CHRONICITY: ICD-10-CM

## 2019-03-01 DIAGNOSIS — M75.81 RIGHT ROTATOR CUFF TENDONITIS: Primary | ICD-10-CM

## 2019-03-01 DIAGNOSIS — M75.22 BILATERAL BICEPS TENDONITIS: ICD-10-CM

## 2019-03-01 DIAGNOSIS — M75.21 BILATERAL BICEPS TENDONITIS: ICD-10-CM

## 2019-03-01 PROCEDURE — 73030 X-RAY EXAM OF SHOULDER: CPT | Performed by: ORTHOPAEDIC SURGERY

## 2019-03-01 PROCEDURE — 20610 DRAIN/INJ JOINT/BURSA W/O US: CPT | Performed by: ORTHOPAEDIC SURGERY

## 2019-03-01 PROCEDURE — 99203 OFFICE O/P NEW LOW 30 MIN: CPT | Performed by: ORTHOPAEDIC SURGERY

## 2019-03-01 NOTE — PROGRESS NOTES
3/1/2019  Sona MUSHTAQ Larson  101/1940  [de-identified]year old   female  Douglas Moody MD    HPI:   Patient presents with:  Consult: Bilateral shoulder pain- pt states pain started 2 wks ago after shoveling. pt work 8 hrs a wk at [employer] as a .  pt using joint cholesterol    • Incontinence    • Other general symptoms(780.99)     torn rotator cuff left arm   • Visual impairment       Past Surgical History:   Procedure Laterality Date   • BREAST LUMPECTOMY Right 8/13/2018    Performed by Jacob Mckeon MD at [facility]. The patient is non-tender and atraumatic with the exception of their bilateral shoulders. The patient's skin is intact and compartments are soft.   The patient's upper extremities are warm and pink with brisk capillary refill and 2+ radial

## 2019-03-01 NOTE — PROCEDURES
After the patient's informed consent was obtained, an informational brochure was given to the patient for review. The patient's left shoulder was sterilely prepped and a combination of 20 mg of Kenalog and 4 cc 1% lidocaine was injected into the shoulder.

## 2019-03-01 NOTE — PROGRESS NOTES
Per verbal order from [facility], draw up 2 syringes, each with 4ml of 1% lidocaine and 2ml of Kenalog 10 for cortisone injection to bilateral shoulder Koko Camara RN

## 2019-03-05 ENCOUNTER — APPOINTMENT (OUTPATIENT)
Dept: PHYSICAL THERAPY | Facility: HOSPITAL | Age: 79
End: 2019-03-05
Attending: ORTHOPAEDIC SURGERY
Payer: MEDICARE

## 2019-03-11 ENCOUNTER — TELEPHONE (OUTPATIENT)
Dept: SURGERY | Facility: CLINIC | Age: 79
End: 2019-03-11

## 2019-03-11 NOTE — TELEPHONE ENCOUNTER
Pt had additional concerns and worry after receiving letter regarding her last mammogram.   Reassurance given, that Dr Cayla Beckham reviewed it, and the order for her next follow up mammogram is appropriate.  Reassured that she is doing all the correct recommenda

## 2019-04-18 ENCOUNTER — TELEPHONE (OUTPATIENT)
Dept: SURGERY | Facility: CLINIC | Age: 79
End: 2019-04-18

## 2019-05-10 ENCOUNTER — LAB ENCOUNTER (OUTPATIENT)
Dept: LAB | Facility: HOSPITAL | Age: 79
End: 2019-05-10
Attending: INTERNAL MEDICINE
Payer: MEDICARE

## 2019-05-10 DIAGNOSIS — M54.50 LUMBAGO: ICD-10-CM

## 2019-05-10 DIAGNOSIS — T78.2XXA GENERALIZED ANAPHYLAXIS: ICD-10-CM

## 2019-05-10 DIAGNOSIS — R07.89 OTHER CHEST PAIN: Primary | ICD-10-CM

## 2019-05-10 DIAGNOSIS — R07.89 OTHER CHEST PAIN: ICD-10-CM

## 2019-05-10 PROCEDURE — 80053 COMPREHEN METABOLIC PANEL: CPT

## 2019-05-10 PROCEDURE — 85025 COMPLETE CBC W/AUTO DIFF WBC: CPT

## 2019-05-10 PROCEDURE — 93005 ELECTROCARDIOGRAM TRACING: CPT

## 2019-05-10 PROCEDURE — 36415 COLL VENOUS BLD VENIPUNCTURE: CPT

## 2019-05-10 PROCEDURE — 80061 LIPID PANEL: CPT

## 2019-05-10 PROCEDURE — 93010 ELECTROCARDIOGRAM REPORT: CPT | Performed by: INTERNAL MEDICINE

## 2019-05-10 PROCEDURE — 81001 URINALYSIS AUTO W/SCOPE: CPT

## 2019-05-14 ENCOUNTER — LAB ENCOUNTER (OUTPATIENT)
Dept: LAB | Facility: HOSPITAL | Age: 79
End: 2019-05-14
Attending: INTERNAL MEDICINE
Payer: MEDICARE

## 2019-05-14 ENCOUNTER — TELEPHONE (OUTPATIENT)
Dept: CARDIOLOGY | Age: 79
End: 2019-05-14

## 2019-05-14 DIAGNOSIS — D64.89 OTHER SPECIFIED ANEMIAS: Primary | ICD-10-CM

## 2019-05-14 PROCEDURE — 82607 VITAMIN B-12: CPT

## 2019-05-14 PROCEDURE — 84466 ASSAY OF TRANSFERRIN: CPT

## 2019-05-14 PROCEDURE — 83540 ASSAY OF IRON: CPT

## 2019-05-14 PROCEDURE — 36415 COLL VENOUS BLD VENIPUNCTURE: CPT

## 2019-05-14 PROCEDURE — 82728 ASSAY OF FERRITIN: CPT

## 2019-05-14 PROCEDURE — 84165 PROTEIN E-PHORESIS SERUM: CPT

## 2019-05-16 ENCOUNTER — OFFICE VISIT (OUTPATIENT)
Dept: CARDIOLOGY | Age: 79
End: 2019-05-16

## 2019-05-16 VITALS
HEIGHT: 63 IN | BODY MASS INDEX: 21.62 KG/M2 | SYSTOLIC BLOOD PRESSURE: 120 MMHG | WEIGHT: 122 LBS | DIASTOLIC BLOOD PRESSURE: 60 MMHG | HEART RATE: 60 BPM | OXYGEN SATURATION: 98 %

## 2019-05-16 DIAGNOSIS — E78.00 PURE HYPERCHOLESTEROLEMIA: ICD-10-CM

## 2019-05-16 DIAGNOSIS — R07.2 PRECORDIAL PAIN: Primary | ICD-10-CM

## 2019-05-16 PROCEDURE — 99204 OFFICE O/P NEW MOD 45 MIN: CPT | Performed by: INTERNAL MEDICINE

## 2019-05-16 RX ORDER — CIPROFLOXACIN 500 MG/1
500 TABLET, FILM COATED ORAL
COMMUNITY
Start: 2019-01-17

## 2019-05-16 RX ORDER — FAMOTIDINE 20 MG
1 TABLET ORAL
COMMUNITY

## 2019-05-16 RX ORDER — SENNOSIDES 8.6 MG
8.6 TABLET ORAL DAILY
COMMUNITY
Start: 2019-01-04

## 2019-05-16 RX ORDER — ATORVASTATIN CALCIUM 20 MG/1
TABLET, FILM COATED ORAL
COMMUNITY
Start: 2018-07-06

## 2019-05-16 RX ORDER — METRONIDAZOLE 250 MG/1
250 TABLET ORAL 4 TIMES DAILY
COMMUNITY
Start: 2018-10-18

## 2019-05-16 RX ORDER — CLONAZEPAM 0.5 MG/1
TABLET ORAL
COMMUNITY
Start: 2018-07-22

## 2019-05-16 RX ORDER — ANASTROZOLE 1 MG/1
1 TABLET ORAL
COMMUNITY
Start: 2018-08-29

## 2019-05-21 ENCOUNTER — LAB ENCOUNTER (OUTPATIENT)
Dept: LAB | Facility: HOSPITAL | Age: 79
End: 2019-05-21
Attending: INTERNAL MEDICINE
Payer: MEDICARE

## 2019-05-21 DIAGNOSIS — D47.2 MONOCLONAL PARAPROTEINEMIA: Primary | ICD-10-CM

## 2019-05-21 DIAGNOSIS — D64.9 ANEMIA, UNSPECIFIED: ICD-10-CM

## 2019-05-21 PROCEDURE — 86334 IMMUNOFIX E-PHORESIS SERUM: CPT

## 2019-05-21 PROCEDURE — 83883 ASSAY NEPHELOMETRY NOT SPEC: CPT

## 2019-05-21 PROCEDURE — 82784 ASSAY IGA/IGD/IGG/IGM EACH: CPT

## 2019-05-21 PROCEDURE — 84166 PROTEIN E-PHORESIS/URINE/CSF: CPT

## 2019-05-21 PROCEDURE — 86335 IMMUNFIX E-PHORSIS/URINE/CSF: CPT

## 2019-05-21 PROCEDURE — 36415 COLL VENOUS BLD VENIPUNCTURE: CPT

## 2019-05-31 ENCOUNTER — HOSPITAL ENCOUNTER (OUTPATIENT)
Dept: GENERAL RADIOLOGY | Facility: HOSPITAL | Age: 79
Discharge: HOME | End: 2019-05-31
Attending: INTERNAL MEDICINE
Payer: MEDICARE

## 2019-05-31 DIAGNOSIS — D64.9 ANEMIA, UNSPECIFIED: ICD-10-CM

## 2019-05-31 DIAGNOSIS — E61.1 DIETARY IRON DEFICIENCY: ICD-10-CM

## 2019-05-31 DIAGNOSIS — Z85.3 PERSONAL HISTORY OF MALIGNANT NEOPLASM OF BREAST: ICD-10-CM

## 2019-05-31 DIAGNOSIS — D47.2 MONOCLONAL PARAPROTEINEMIA: ICD-10-CM

## 2019-05-31 PROCEDURE — 77075 RADEX OSSEOUS SURVEY COMPL: CPT | Performed by: INTERNAL MEDICINE

## 2019-07-23 ENCOUNTER — ANCILLARY PROCEDURE (OUTPATIENT)
Dept: CARDIOLOGY | Age: 79
End: 2019-07-23
Attending: INTERNAL MEDICINE

## 2019-07-23 VITALS
SYSTOLIC BLOOD PRESSURE: 110 MMHG | DIASTOLIC BLOOD PRESSURE: 74 MMHG | HEART RATE: 67 BPM | WEIGHT: 122 LBS | HEIGHT: 63 IN | BODY MASS INDEX: 21.62 KG/M2

## 2019-07-23 DIAGNOSIS — R07.2 PRECORDIAL PAIN: ICD-10-CM

## 2019-07-23 PROCEDURE — 93017 CV STRESS TEST TRACING ONLY: CPT | Performed by: INTERNAL MEDICINE

## 2019-07-23 PROCEDURE — 93016 CV STRESS TEST SUPVJ ONLY: CPT | Performed by: INTERNAL MEDICINE

## 2019-07-23 PROCEDURE — A9502 TC99M TETROFOSMIN: HCPCS | Performed by: INTERNAL MEDICINE

## 2019-07-23 PROCEDURE — 93018 CV STRESS TEST I&R ONLY: CPT | Performed by: INTERNAL MEDICINE

## 2019-07-23 PROCEDURE — 78452 HT MUSCLE IMAGE SPECT MULT: CPT | Performed by: INTERNAL MEDICINE

## 2019-07-23 PROCEDURE — X1094 NO CHARGE VISIT: HCPCS

## 2019-07-23 PROCEDURE — 93306 TTE W/DOPPLER COMPLETE: CPT | Performed by: INTERNAL MEDICINE

## 2019-07-23 ASSESSMENT — EXERCISE STRESS TEST
STAGE_CATEGORIES: RECOVERY 0
PEAK_RPP: 11096
GRADE: 0
PEAK_BP: 150/74
STOPPAGE_REASON: GENERAL FATIGUE
STAGE_CATEGORIES: 1
GRADE: 10
PEAK_BP: 132/68
MPH: 2.5
PEAK_HR: 129
COMMENTS: 6 MIN RECOVERY
MPH: 1.7
GRADE: 12
PEAK_HR: 124
PEAK_BP: 160/70
PEAK_RPP: 9768
PEAK_RPP: 20832
PEAK_BP: 146/80
PEAK_HR: 86
PEAK_HR: 74
PEAK_RPP: 11760
STAGE_CATEGORIES: RECOVERY 1
PEAK_HR: 67
STAGE_CATEGORIES: RECOVERY 3
PEAK_BP: 110/74
PEAK_HR: 98
PEAK_HR: 76
STAGE_CATEGORIES: 2
PEAK_BP: 168/72
COMMENTS: 2 MIN RECOVERY
PEAK_RPP: 19350
STAGE_CATEGORIES: RESTING
MPH: 1.2
PEAK_RPP: 13760
STAGE_CATEGORIES: RECOVERY 2
PEAK_RPP: 7370
PEAK_BP: 120/70

## 2019-07-24 LAB
HEART RATE RESERVE PREDICTED: 7.19 BPM
LV EF: 85 %
PEAK HR ACHIEVED: 129 BPM
RESTING HR ACHIEVED: 67 BPM
STRESS BASELINE BP: NORMAL MMHG
STRESS PERCENT HR: 93 %
STRESS POST ESTIMATED WORKLOAD: 5.1 METS
STRESS POST EXERCISE DUR MIN: 4 MIN
STRESS POST EXERCISE DUR SEC: 38 SEC
STRESS POST PEAK BP: NORMAL MMHG
STRESS TARGET HR: 139 BPM

## 2019-07-26 ENCOUNTER — TELEPHONE (OUTPATIENT)
Dept: CARDIOLOGY | Age: 79
End: 2019-07-26

## 2019-08-19 PROBLEM — E78.5 HYPERLIPIDEMIA: Status: ACTIVE | Noted: 2019-08-19

## 2019-08-28 ENCOUNTER — OFFICE VISIT (OUTPATIENT)
Dept: SURGERY | Facility: CLINIC | Age: 79
End: 2019-08-28
Payer: MEDICARE

## 2019-08-28 ENCOUNTER — OFFICE VISIT (OUTPATIENT)
Dept: HEMATOLOGY/ONCOLOGY | Facility: HOSPITAL | Age: 79
End: 2019-08-28
Attending: INTERNAL MEDICINE
Payer: MEDICARE

## 2019-08-28 ENCOUNTER — HOSPITAL ENCOUNTER (OUTPATIENT)
Dept: MAMMOGRAPHY | Facility: HOSPITAL | Age: 79
Discharge: HOME | End: 2019-08-28
Attending: SURGERY
Payer: MEDICARE

## 2019-08-28 VITALS
SYSTOLIC BLOOD PRESSURE: 145 MMHG | BODY MASS INDEX: 22.47 KG/M2 | WEIGHT: 126.81 LBS | OXYGEN SATURATION: 98 % | HEART RATE: 69 BPM | RESPIRATION RATE: 16 BRPM | DIASTOLIC BLOOD PRESSURE: 72 MMHG | HEIGHT: 63 IN

## 2019-08-28 DIAGNOSIS — D47.2 MONOCLONAL GAMMOPATHY: ICD-10-CM

## 2019-08-28 DIAGNOSIS — M81.0 OSTEOPOROSIS, UNSPECIFIED OSTEOPOROSIS TYPE, UNSPECIFIED PATHOLOGICAL FRACTURE PRESENCE: ICD-10-CM

## 2019-08-28 DIAGNOSIS — Z17.0 MALIGNANT NEOPLASM OF RIGHT BREAST IN FEMALE, ESTROGEN RECEPTOR POSITIVE, UNSPECIFIED SITE OF BREAST (HCC): Primary | ICD-10-CM

## 2019-08-28 DIAGNOSIS — C50.911 MALIGNANT NEOPLASM OF RIGHT BREAST IN FEMALE, ESTROGEN RECEPTOR POSITIVE, UNSPECIFIED SITE OF BREAST (HCC): Primary | ICD-10-CM

## 2019-08-28 DIAGNOSIS — Z51.81 ENCOUNTER FOR MONITORING ADJUVANT HORMONAL THERAPY: ICD-10-CM

## 2019-08-28 DIAGNOSIS — Z17.0 MALIGNANT NEOPLASM OF UPPER-OUTER QUADRANT OF RIGHT BREAST IN FEMALE, ESTROGEN RECEPTOR POSITIVE (HCC): ICD-10-CM

## 2019-08-28 DIAGNOSIS — C50.411 MALIGNANT NEOPLASM OF UPPER-OUTER QUADRANT OF RIGHT BREAST IN FEMALE, ESTROGEN RECEPTOR POSITIVE (HCC): ICD-10-CM

## 2019-08-28 DIAGNOSIS — Z17.0 MALIGNANT NEOPLASM OF UPPER-OUTER QUADRANT OF RIGHT BREAST IN FEMALE, ESTROGEN RECEPTOR POSITIVE (HCC): Primary | ICD-10-CM

## 2019-08-28 DIAGNOSIS — C50.411 MALIGNANT NEOPLASM OF UPPER-OUTER QUADRANT OF RIGHT BREAST IN FEMALE, ESTROGEN RECEPTOR POSITIVE (HCC): Primary | ICD-10-CM

## 2019-08-28 DIAGNOSIS — Z79.899 ENCOUNTER FOR MONITORING ADJUVANT HORMONAL THERAPY: ICD-10-CM

## 2019-08-28 PROCEDURE — 99214 OFFICE O/P EST MOD 30 MIN: CPT | Performed by: INTERNAL MEDICINE

## 2019-08-28 PROCEDURE — 99214 OFFICE O/P EST MOD 30 MIN: CPT | Performed by: SURGERY

## 2019-08-28 PROCEDURE — 77062 BREAST TOMOSYNTHESIS BI: CPT | Performed by: SURGERY

## 2019-08-28 PROCEDURE — 77066 DX MAMMO INCL CAD BI: CPT | Performed by: SURGERY

## 2019-08-28 NOTE — PROGRESS NOTES
Cancer Center Progress Note    Patient Name: Shilpa Avalos   YOB: 1940   Medical Record Number: [id number]   Attending Physician: Marleni Tobar M.D.      Chief Complaint:  hormone receptor positive right breast cancer    History of Present Illn URETEROSCOPY Right 1/4/2019    Performed by Shawna Argueta MD at [address] [place] [address] MAIN OR   • CYSTOSCOPY,INSERT URETERAL STENT     • ENDOSCOPIC RETROGRADE CHOLANGIOPANCREATOGRAPHY (ERCP) N/A 8/2/2017    Performed by David Cartagena MD at [address] [place] [address] ENDOSCOPY   • ENDOSCOPI Social connections:        Talks on phone: Not on file        Gets together: Not on file        Attends Christianity service: Not on file        Active member of club or organization: Not on file        Attends meetings of clubs or organizations: Not on fi 05/10/2019    BUNCREA 23.0 (H) 05/10/2019    CREATSERUM 0.74 05/10/2019    ANIONGAP 5 05/10/2019    GFRNAA 77 05/10/2019    GFRAA 88 05/10/2019    CA 9.6 05/10/2019    OSMOCALC 295 05/10/2019    ALKPHO 84 05/10/2019    AST 25 05/10/2019    ALT 24 05/10/201

## 2019-08-28 NOTE — PROGRESS NOTES
Breast Surgery Surveillance    Diagnosis: Right breast cancer status post lumpectomy on August 13, 2018.     Stage: Cancer Staging  Malignant neoplasm of right breast in female, estrogen receptor positive ([facility] [address]  Staging form: Breast, AJCC 8 kidney    • Cancer [facility]) 08/2018    right breast Ca   • Colitis     completed antibiotic therapy for C. Diff colitis   • Colon polyps    • Depression    • High cholesterol    • Incontinence    • Other general symptoms(780.99)     torn rotator cuff left arm 2 tabs in the evening Disp:  Rfl: 2   VITAMIN D, CHOLECALCIFEROL, OR Take 1 tablet by mouth daily.  Disp:  Rfl:      Ciprofloxacin HCl (CIPRO) tab 500 mg 500 mg Oral Once Omid Feliciano MD       Allergies:      Morphine                OTHER (SEE COMMENTS walking.     Breasts:  See history of present illness    Gastrointestinal:     There is no history of difficulty or pain with swallowing, reflux symptoms, vomiting, dark or bloody stools, constipation, yellowing of the skin, indigestion, nausea, change in b midline trachea and no thyromegaly. Range of motion is good at both shoulders. Breasts are symmetrical. The tumorectomy site is in the upper outer right  breast and shows no evidence of local recurrence. Both nipples are everted with no discharge.  There is

## 2019-10-29 RX ORDER — ANASTROZOLE 1 MG/1
TABLET ORAL
Qty: 90 TABLET | Refills: 3 | Status: SHIPPED | OUTPATIENT
Start: 2019-10-29 | End: 2020-05-05 | Stop reason: SINTOL

## 2019-12-07 ENCOUNTER — LAB ENCOUNTER (OUTPATIENT)
Dept: LAB | Facility: HOSPITAL | Age: 79
End: 2019-12-07
Attending: INTERNAL MEDICINE
Payer: MEDICARE

## 2019-12-07 DIAGNOSIS — D64.9 ANEMIA, UNSPECIFIED: Primary | ICD-10-CM

## 2019-12-07 DIAGNOSIS — Z85.3 PERSONAL HISTORY OF MALIGNANT NEOPLASM OF BREAST: ICD-10-CM

## 2019-12-07 DIAGNOSIS — E61.1 IRON DEFICIENCY: ICD-10-CM

## 2019-12-07 DIAGNOSIS — D47.2 MONOCLONAL PARAPROTEINEMIA: ICD-10-CM

## 2019-12-07 PROCEDURE — 82728 ASSAY OF FERRITIN: CPT

## 2019-12-07 PROCEDURE — 85025 COMPLETE CBC W/AUTO DIFF WBC: CPT

## 2019-12-07 PROCEDURE — 83883 ASSAY NEPHELOMETRY NOT SPEC: CPT

## 2019-12-07 PROCEDURE — 36415 COLL VENOUS BLD VENIPUNCTURE: CPT

## 2019-12-07 PROCEDURE — 84466 ASSAY OF TRANSFERRIN: CPT

## 2019-12-07 PROCEDURE — 80053 COMPREHEN METABOLIC PANEL: CPT

## 2019-12-07 PROCEDURE — 83540 ASSAY OF IRON: CPT

## 2019-12-07 PROCEDURE — 84165 PROTEIN E-PHORESIS SERUM: CPT

## 2019-12-07 PROCEDURE — 86334 IMMUNOFIX E-PHORESIS SERUM: CPT

## 2020-02-25 ENCOUNTER — HOSPITAL ENCOUNTER (EMERGENCY)
Facility: HOSPITAL | Age: 80
Discharge: HOME | End: 2020-02-25
Attending: EMERGENCY MEDICINE
Payer: MEDICARE

## 2020-02-25 ENCOUNTER — TELEPHONE (OUTPATIENT)
Dept: HEMATOLOGY/ONCOLOGY | Facility: HOSPITAL | Age: 80
End: 2020-02-25

## 2020-02-25 VITALS
HEART RATE: 80 BPM | BODY MASS INDEX: 22.15 KG/M2 | SYSTOLIC BLOOD PRESSURE: 124 MMHG | RESPIRATION RATE: 20 BRPM | HEIGHT: 63 IN | WEIGHT: 125 LBS | OXYGEN SATURATION: 99 % | TEMPERATURE: 98 F | DIASTOLIC BLOOD PRESSURE: 74 MMHG

## 2020-02-25 DIAGNOSIS — A08.4 VIRAL GASTROENTERITIS: Primary | ICD-10-CM

## 2020-02-25 LAB
ANION GAP SERPL CALC-SCNC: 7 MMOL/L (ref 0–18)
BACTERIA UR QL AUTO: NEGATIVE /HPF
BASOPHILS # BLD AUTO: 0.02 X10(3) UL (ref 0–0.2)
BASOPHILS NFR BLD AUTO: 0.1 %
BILIRUB UR QL: NEGATIVE
BUN BLD-MCNC: 37 MG/DL (ref 7–18)
BUN/CREAT SERPL: 47.4 (ref 10–20)
CALCIUM BLD-MCNC: 9.1 MG/DL (ref 8.5–10.1)
CHLORIDE SERPL-SCNC: 110 MMOL/L (ref 98–112)
CLARITY UR: CLEAR
CO2 SERPL-SCNC: 23 MMOL/L (ref 21–32)
COLOR UR: YELLOW
CREAT BLD-MCNC: 0.78 MG/DL (ref 0.55–1.02)
DEPRECATED RDW RBC AUTO: 41.9 FL (ref 35.1–46.3)
EOSINOPHIL # BLD AUTO: 0.03 X10(3) UL (ref 0–0.7)
EOSINOPHIL NFR BLD AUTO: 0.2 %
ERYTHROCYTE [DISTWIDTH] IN BLOOD BY AUTOMATED COUNT: 12.8 % (ref 11–15)
GLUCOSE BLD-MCNC: 118 MG/DL (ref 70–99)
GLUCOSE UR-MCNC: NEGATIVE MG/DL
HCT VFR BLD AUTO: 40.3 % (ref 35–48)
HGB BLD-MCNC: 13 G/DL (ref 12–16)
IMM GRANULOCYTES # BLD AUTO: 0.05 X10(3) UL (ref 0–1)
IMM GRANULOCYTES NFR BLD: 0.4 %
LIPASE SERPL-CCNC: 96 U/L (ref 73–393)
LYMPHOCYTES # BLD AUTO: 1.04 X10(3) UL (ref 1–4)
LYMPHOCYTES NFR BLD AUTO: 7.6 %
MCH RBC QN AUTO: 28.8 PG (ref 26–34)
MCHC RBC AUTO-ENTMCNC: 32.3 G/DL (ref 31–37)
MCV RBC AUTO: 89.4 FL (ref 80–100)
MONOCYTES # BLD AUTO: 0.73 X10(3) UL (ref 0.1–1)
MONOCYTES NFR BLD AUTO: 5.3 %
NEUTROPHILS # BLD AUTO: 11.78 X10 (3) UL (ref 1.5–7.7)
NEUTROPHILS # BLD AUTO: 11.78 X10(3) UL (ref 1.5–7.7)
NEUTROPHILS NFR BLD AUTO: 86.4 %
NITRITE UR QL STRIP.AUTO: NEGATIVE
OSMOLALITY SERPL CALC.SUM OF ELEC: 300 MOSM/KG (ref 275–295)
PH UR: 5 [PH] (ref 5–8)
PLATELET # BLD AUTO: 224 10(3)UL (ref 150–450)
POTASSIUM SERPL-SCNC: 3.8 MMOL/L (ref 3.5–5.1)
PROT UR-MCNC: NEGATIVE MG/DL
RBC # BLD AUTO: 4.51 X10(6)UL (ref 3.8–5.3)
RBC #/AREA URNS AUTO: 4 /HPF
SODIUM SERPL-SCNC: 140 MMOL/L (ref 136–145)
SP GR UR STRIP: 1.03 (ref 1–1.03)
UROBILINOGEN UR STRIP-ACNC: <2
WBC # BLD AUTO: 13.7 X10(3) UL (ref 4–11)
WBC #/AREA URNS AUTO: 1 /HPF

## 2020-02-25 PROCEDURE — 96374 THER/PROPH/DIAG INJ IV PUSH: CPT

## 2020-02-25 PROCEDURE — 83690 ASSAY OF LIPASE: CPT | Performed by: EMERGENCY MEDICINE

## 2020-02-25 PROCEDURE — 96361 HYDRATE IV INFUSION ADD-ON: CPT

## 2020-02-25 PROCEDURE — 80048 BASIC METABOLIC PNL TOTAL CA: CPT | Performed by: EMERGENCY MEDICINE

## 2020-02-25 PROCEDURE — 85025 COMPLETE CBC W/AUTO DIFF WBC: CPT | Performed by: EMERGENCY MEDICINE

## 2020-02-25 PROCEDURE — S0028 INJECTION, FAMOTIDINE, 20 MG: HCPCS | Performed by: EMERGENCY MEDICINE

## 2020-02-25 PROCEDURE — 99284 EMERGENCY DEPT VISIT MOD MDM: CPT

## 2020-02-25 PROCEDURE — 81001 URINALYSIS AUTO W/SCOPE: CPT | Performed by: EMERGENCY MEDICINE

## 2020-02-25 PROCEDURE — 96375 TX/PRO/DX INJ NEW DRUG ADDON: CPT

## 2020-02-25 RX ORDER — ONDANSETRON 4 MG/1
4 TABLET, ORALLY DISINTEGRATING ORAL EVERY 4 HOURS PRN
Qty: 10 TABLET | Refills: 0 | Status: SHIPPED | OUTPATIENT
Start: 2020-02-25 | End: 2020-03-03

## 2020-02-25 RX ORDER — LOPERAMIDE HYDROCHLORIDE 2 MG/1
2 TABLET ORAL AS NEEDED
Qty: 20 TABLET | Refills: 0 | Status: SHIPPED | OUTPATIENT
Start: 2020-02-25 | End: 2020-03-26

## 2020-02-25 RX ORDER — ONDANSETRON 2 MG/ML
4 INJECTION INTRAMUSCULAR; INTRAVENOUS ONCE
Status: COMPLETED | OUTPATIENT
Start: 2020-02-25 | End: 2020-02-25

## 2020-02-25 RX ORDER — FAMOTIDINE 10 MG/ML
20 INJECTION, SOLUTION INTRAVENOUS ONCE
Status: COMPLETED | OUTPATIENT
Start: 2020-02-25 | End: 2020-02-25

## 2020-02-25 RX ORDER — LOPERAMIDE HYDROCHLORIDE 2 MG/1
4 CAPSULE ORAL ONCE
Status: COMPLETED | OUTPATIENT
Start: 2020-02-25 | End: 2020-02-25

## 2020-02-25 NOTE — TELEPHONE ENCOUNTER
Sona just got back from the [facility]. She was diagnosed with viral gastroenteritis. She cancelled her mammogram appt & the appt to see Dr Radha Moralez tomorrow. She will reschedule once she is feeling better.  She will be scheduling her f/u appt with Dr Radha Moralez around t

## 2020-02-25 NOTE — ED PROVIDER NOTES
Patient Seen in: [facility] AND [facility] Emergency Department      History   Patient presents with:  Nausea/Vomiting/Diarrhea    Stated Complaint: nausea and vomiting    HPI    55-year-old female with past medical history significant for kidney stones, breast 5/21/2017   • FRAGMENTING OF KIDNEY STONE Right 11/16/2018    Rt.  ESWL with pre-stenting, Dr. Regulo Gomez @ [address] [place] [address]   • HYSTERECTOMY      40   • LAPAROSCOPIC CHOLECYSTECTOMY N/A 6/30/2017    Performed by Rich Solis MD at [address] [place] [address] MAIN OR   • LUMPECTOMY RIG URINALYSIS WITH CULTURE REFLEX - Abnormal; Notable for the following components:       Result Value    Ketones Urine Trace (*)     Blood Urine Small (*)     Leukocyte Esterase Urine Small (*)     RBC URINE 4 (*)     All other components within normal carranza months to obtain basic health screening including reassessment of your blood pressure.     Medications Prescribed:  Current Discharge Medication List    START taking these medications    ondansetron 4 MG Oral Tablet Dispersible  Take 1 tablet (4 mg total) b

## 2020-02-25 NOTE — ED INITIAL ASSESSMENT (HPI)
Vomiting and nausea since 0230 today- slight diarrhea- states hx colitis. Denies fevers. Denies pain.

## 2020-02-26 ENCOUNTER — TELEPHONE (OUTPATIENT)
Dept: HEMATOLOGY/ONCOLOGY | Facility: HOSPITAL | Age: 80
End: 2020-02-26

## 2020-02-26 ENCOUNTER — APPOINTMENT (OUTPATIENT)
Dept: HEMATOLOGY/ONCOLOGY | Facility: HOSPITAL | Age: 80
End: 2020-02-26
Attending: INTERNAL MEDICINE
Payer: MEDICARE

## 2020-02-26 NOTE — TELEPHONE ENCOUNTER
Sona called to schedule a follow up with  for 4/22 at 12:45. She is seeing  at 12:30. She wanted to make sure this would be enough time.

## 2020-04-17 ENCOUNTER — TELEPHONE (OUTPATIENT)
Dept: SURGERY | Facility: CLINIC | Age: 80
End: 2020-04-17

## 2020-04-17 NOTE — TELEPHONE ENCOUNTER
Calling to reschedule patient due to Covid. BUT patient is keeping her MedOnc and Imaging appointment on 4/22, and was adamant about keeping her appt with Dr. Melanie Langley as well. Stated she want them all done in one day. Informed Dr of Pt choice.

## 2020-04-22 ENCOUNTER — OFFICE VISIT (OUTPATIENT)
Dept: SURGERY | Facility: CLINIC | Age: 80
End: 2020-04-22
Payer: MEDICARE

## 2020-04-22 ENCOUNTER — HOSPITAL ENCOUNTER (OUTPATIENT)
Dept: MAMMOGRAPHY | Facility: HOSPITAL | Age: 80
Discharge: HOME | End: 2020-04-22
Attending: SURGERY
Payer: MEDICARE

## 2020-04-22 ENCOUNTER — OFFICE VISIT (OUTPATIENT)
Dept: HEMATOLOGY/ONCOLOGY | Facility: HOSPITAL | Age: 80
End: 2020-04-22
Attending: INTERNAL MEDICINE
Payer: MEDICARE

## 2020-04-22 VITALS
WEIGHT: 127.81 LBS | DIASTOLIC BLOOD PRESSURE: 73 MMHG | OXYGEN SATURATION: 97 % | HEIGHT: 63 IN | BODY MASS INDEX: 22.64 KG/M2 | SYSTOLIC BLOOD PRESSURE: 141 MMHG | RESPIRATION RATE: 16 BRPM | HEART RATE: 72 BPM

## 2020-04-22 DIAGNOSIS — Z17.0 MALIGNANT NEOPLASM OF UPPER-OUTER QUADRANT OF RIGHT BREAST IN FEMALE, ESTROGEN RECEPTOR POSITIVE (HCC): ICD-10-CM

## 2020-04-22 DIAGNOSIS — C50.411 MALIGNANT NEOPLASM OF UPPER-OUTER QUADRANT OF RIGHT BREAST IN FEMALE, ESTROGEN RECEPTOR POSITIVE (HCC): Primary | ICD-10-CM

## 2020-04-22 DIAGNOSIS — C50.911 MALIGNANT NEOPLASM OF RIGHT BREAST IN FEMALE, ESTROGEN RECEPTOR POSITIVE, UNSPECIFIED SITE OF BREAST (HCC): ICD-10-CM

## 2020-04-22 DIAGNOSIS — C50.411 MALIGNANT NEOPLASM OF UPPER-OUTER QUADRANT OF RIGHT BREAST IN FEMALE, ESTROGEN RECEPTOR POSITIVE (HCC): ICD-10-CM

## 2020-04-22 DIAGNOSIS — D47.2 MONOCLONAL GAMMOPATHY: Primary | ICD-10-CM

## 2020-04-22 DIAGNOSIS — Z17.0 MALIGNANT NEOPLASM OF RIGHT BREAST IN FEMALE, ESTROGEN RECEPTOR POSITIVE, UNSPECIFIED SITE OF BREAST (HCC): ICD-10-CM

## 2020-04-22 DIAGNOSIS — Z17.0 MALIGNANT NEOPLASM OF UPPER-OUTER QUADRANT OF RIGHT BREAST IN FEMALE, ESTROGEN RECEPTOR POSITIVE (HCC): Primary | ICD-10-CM

## 2020-04-22 DIAGNOSIS — M81.0 OSTEOPOROSIS, UNSPECIFIED OSTEOPOROSIS TYPE, UNSPECIFIED PATHOLOGICAL FRACTURE PRESENCE: ICD-10-CM

## 2020-04-22 PROCEDURE — 99214 OFFICE O/P EST MOD 30 MIN: CPT | Performed by: INTERNAL MEDICINE

## 2020-04-22 PROCEDURE — 99214 OFFICE O/P EST MOD 30 MIN: CPT | Performed by: SURGERY

## 2020-04-22 PROCEDURE — 77061 BREAST TOMOSYNTHESIS UNI: CPT | Performed by: SURGERY

## 2020-04-22 PROCEDURE — 77065 DX MAMMO INCL CAD UNI: CPT | Performed by: SURGERY

## 2020-04-22 NOTE — PROGRESS NOTES
Breast Surgery Surveillance    Diagnosis: Right breast cancer status post lumpectomy on August 13, 2018.     Stage: Cancer Staging  Malignant neoplasm of right breast in female, estrogen receptor positive ([facility] [address]  Staging form: Breast, AJCC 8 kidney    • Cancer [facility]) 08/2018    right breast Ca   • Colitis     completed antibiotic therapy for C. Diff colitis   • Colon polyps    • Depression    • High cholesterol    • Incontinence    • Other general symptoms(780.99)     torn rotator cuff left arm 3  atorvastatin 20 MG Oral Tab, TK 1 T PO QD, Disp: , Rfl: 1  ClonazePAM 0.5 MG Oral Tab, pt takes 1 tab in the morning and 2 tabs in the evening, Disp: , Rfl: 2  VITAMIN D, CHOLECALCIFEROL, OR, Take 1 tablet by mouth daily. , Disp: , Rfl:     Ciprofloxacin or near-fainting, difficulty breathing when lying flat, SOB/Coughing at night, swelling of the legs or chest pain while walking.     Breasts:  See history of present illness    Gastrointestinal:     There is no history of difficulty or pain with swallowing, oriented woman. There is not palpable cervical, supraclavicular or axillary adenopathy. The neck is supple with a midline trachea and no thyromegaly. Range of motion is good at both shoulders.  Breasts are symmetrical. The tumorectomy site is in the upper

## 2020-04-22 NOTE — PROGRESS NOTES
Cancer Center Progress Note    Patient Name: Benedicto Aj   YOB: 1940   Medical Record Number: [id number]   Attending Physician: Bravo Godoy M.D.      Chief Complaint:  hormone receptor positive right breast cancer    History of Present Illn URETEROSCOPY Right 1/4/2019    Performed by Sylvia Sosa MD at [address] [place] [address] MAIN OR   • CYSTOSCOPY,INSERT URETERAL STENT     • ENDOSCOPIC RETROGRADE CHOLANGIOPANCREATOGRAPHY (ERCP) N/A 8/2/2017    Performed by Juan Jose Bruce MD at [address] [place] [address] ENDOSCOPY   • ENDOSCOPI Social connections:        Talks on phone: Not on file        Gets together: Not on file        Attends Jehovah's witness service: Not on file        Active member of club or organization: Not on file        Attends meetings of clubs or organizations: Not on fi Results   Component Value Date     (H) 02/25/2020    BUN 37 (H) 02/25/2020    BUNCREA 47.4 (H) 02/25/2020    CREATSERUM 0.78 02/25/2020    ANIONGAP 7 02/25/2020    GFRNAA 72 02/25/2020    GFRAA 82 02/25/2020    CA 9.1 02/25/2020    OSMOCALC 300 (H) MD

## 2020-05-04 ENCOUNTER — TELEPHONE (OUTPATIENT)
Dept: HEMATOLOGY/ONCOLOGY | Facility: HOSPITAL | Age: 80
End: 2020-05-04

## 2020-05-04 NOTE — TELEPHONE ENCOUNTER
Sona called and was looking to speak with a Dr or nurse about her anastrozole. She asked that she please get a call back to go over this.

## 2020-05-04 NOTE — TELEPHONE ENCOUNTER
Attempted to call Sona back, phone rings a few times and goes to fast busy signal.  Will try again later.

## 2020-05-05 ENCOUNTER — TELEPHONE (OUTPATIENT)
Dept: HEMATOLOGY/ONCOLOGY | Facility: HOSPITAL | Age: 80
End: 2020-05-05

## 2020-05-05 RX ORDER — LETROZOLE 2.5 MG/1
2.5 TABLET, FILM COATED ORAL DAILY
Qty: 90 TABLET | Refills: 1 | Status: SHIPPED | OUTPATIENT
Start: 2020-05-05 | End: 2020-11-20

## 2020-05-05 NOTE — TELEPHONE ENCOUNTER
Called Sona back, she reports that the last time she saw Dr Shanita Barnett, she expressed concerns about the increased muscular aches and pains she's been having. She has held her anastazole since her last visit, and reports her symptoms have improved.   I updated D

## 2020-05-05 NOTE — TELEPHONE ENCOUNTER
Sona called and said [facility]. She stated that she will be in the kitchen the next couple of hours and asked that she get a call back.

## 2020-05-11 ENCOUNTER — TELEPHONE (OUTPATIENT)
Dept: HEMATOLOGY/ONCOLOGY | Facility: HOSPITAL | Age: 80
End: 2020-05-11

## 2020-07-15 ENCOUNTER — TELEPHONE (OUTPATIENT)
Dept: HEMATOLOGY/ONCOLOGY | Facility: HOSPITAL | Age: 80
End: 2020-07-15

## 2020-07-15 NOTE — TELEPHONE ENCOUNTER
Toxicities: Letrozole 2.5 mg by mouth daily    Arthralgias    Arthralgias (Patient reports she is having pain in her legs, knees, both arms, elbows, and lower back. Over the last two weeks the pain has gotten more intense then when she was on Anastrozole. )

## 2020-08-17 ENCOUNTER — LAB ENCOUNTER (OUTPATIENT)
Dept: LAB | Facility: HOSPITAL | Age: 80
End: 2020-08-17
Attending: INTERNAL MEDICINE
Payer: MEDICARE

## 2020-08-17 DIAGNOSIS — E78.5 HYPERLIPIDEMIA: ICD-10-CM

## 2020-08-17 DIAGNOSIS — E07.9 DISEASE OF THYROID GLAND: ICD-10-CM

## 2020-08-17 DIAGNOSIS — M13.80 MIGRATORY POLYARTHRITIS: ICD-10-CM

## 2020-08-17 DIAGNOSIS — I10 HTN (HYPERTENSION): Primary | ICD-10-CM

## 2020-08-17 DIAGNOSIS — M81.0 SENILE OSTEOPOROSIS: ICD-10-CM

## 2020-08-17 LAB
ALBUMIN SERPL-MCNC: 3.8 G/DL (ref 3.4–5)
ALBUMIN/GLOB SERPL: 1 {RATIO} (ref 1–2)
ALP LIVER SERPL-CCNC: 89 U/L (ref 55–142)
ALT SERPL-CCNC: 22 U/L (ref 13–56)
ANION GAP SERPL CALC-SCNC: 5 MMOL/L (ref 0–18)
AST SERPL-CCNC: 20 U/L (ref 15–37)
BACTERIA UR QL AUTO: NEGATIVE /HPF
BASOPHILS # BLD AUTO: 0.03 X10(3) UL (ref 0–0.2)
BASOPHILS NFR BLD AUTO: 0.5 %
BILIRUB SERPL-MCNC: 0.5 MG/DL (ref 0.1–2)
BILIRUB UR QL: NEGATIVE
BUN BLD-MCNC: 24 MG/DL (ref 7–18)
BUN/CREAT SERPL: 24.5 (ref 10–20)
CALCIUM BLD-MCNC: 9.1 MG/DL (ref 8.5–10.1)
CHLORIDE SERPL-SCNC: 107 MMOL/L (ref 98–112)
CHOLEST SMN-MCNC: 168 MG/DL (ref ?–200)
CLARITY UR: CLEAR
CO2 SERPL-SCNC: 29 MMOL/L (ref 21–32)
COLOR UR: YELLOW
CREAT BLD-MCNC: 0.98 MG/DL (ref 0.55–1.02)
CRP SERPL-MCNC: <0.29 MG/DL (ref ?–0.3)
DEPRECATED RDW RBC AUTO: 40.4 FL (ref 35.1–46.3)
EOSINOPHIL # BLD AUTO: 0.08 X10(3) UL (ref 0–0.7)
EOSINOPHIL NFR BLD AUTO: 1.3 %
ERYTHROCYTE [DISTWIDTH] IN BLOOD BY AUTOMATED COUNT: 12.6 % (ref 11–15)
GLOBULIN PLAS-MCNC: 3.8 G/DL (ref 2.8–4.4)
GLUCOSE BLD-MCNC: 90 MG/DL (ref 70–99)
GLUCOSE UR-MCNC: NEGATIVE MG/DL
HCT VFR BLD AUTO: 36.4 % (ref 35–48)
HDLC SERPL-MCNC: 64 MG/DL (ref 40–59)
HGB BLD-MCNC: 11.8 G/DL (ref 12–16)
IMM GRANULOCYTES # BLD AUTO: 0.01 X10(3) UL (ref 0–1)
IMM GRANULOCYTES NFR BLD: 0.2 %
KETONES UR-MCNC: NEGATIVE MG/DL
LDLC SERPL CALC-MCNC: 86 MG/DL (ref ?–100)
LYMPHOCYTES # BLD AUTO: 2.04 X10(3) UL (ref 1–4)
LYMPHOCYTES NFR BLD AUTO: 31.9 %
M PROTEIN MFR SERPL ELPH: 7.6 G/DL (ref 6.4–8.2)
MCH RBC QN AUTO: 28.4 PG (ref 26–34)
MCHC RBC AUTO-ENTMCNC: 32.4 G/DL (ref 31–37)
MCV RBC AUTO: 87.7 FL (ref 80–100)
MONOCYTES # BLD AUTO: 0.69 X10(3) UL (ref 0.1–1)
MONOCYTES NFR BLD AUTO: 10.8 %
NEUTROPHILS # BLD AUTO: 3.55 X10 (3) UL (ref 1.5–7.7)
NEUTROPHILS # BLD AUTO: 3.55 X10(3) UL (ref 1.5–7.7)
NEUTROPHILS NFR BLD AUTO: 55.3 %
NITRITE UR QL STRIP.AUTO: NEGATIVE
NONHDLC SERPL-MCNC: 104 MG/DL (ref ?–130)
OSMOLALITY SERPL CALC.SUM OF ELEC: 296 MOSM/KG (ref 275–295)
PATIENT FASTING Y/N/NP: YES
PATIENT FASTING Y/N/NP: YES
PH UR: 5 [PH] (ref 5–8)
PLATELET # BLD AUTO: 193 10(3)UL (ref 150–450)
POTASSIUM SERPL-SCNC: 4.5 MMOL/L (ref 3.5–5.1)
PROT UR-MCNC: NEGATIVE MG/DL
RBC # BLD AUTO: 4.15 X10(6)UL (ref 3.8–5.3)
RBC #/AREA URNS AUTO: 2 /HPF
SODIUM SERPL-SCNC: 141 MMOL/L (ref 136–145)
SP GR UR STRIP: 1.02 (ref 1–1.03)
TRIGL SERPL-MCNC: 88 MG/DL (ref 30–149)
TSI SER-ACNC: 1.58 MIU/ML (ref 0.36–3.74)
UROBILINOGEN UR STRIP-ACNC: <2
VLDLC SERPL CALC-MCNC: 18 MG/DL (ref 0–30)
WBC # BLD AUTO: 6.4 X10(3) UL (ref 4–11)
WBC #/AREA URNS AUTO: 1 /HPF

## 2020-08-17 PROCEDURE — 80061 LIPID PANEL: CPT

## 2020-08-17 PROCEDURE — 84443 ASSAY THYROID STIM HORMONE: CPT

## 2020-08-17 PROCEDURE — 86140 C-REACTIVE PROTEIN: CPT

## 2020-08-17 PROCEDURE — 80053 COMPREHEN METABOLIC PANEL: CPT

## 2020-08-17 PROCEDURE — 85025 COMPLETE CBC W/AUTO DIFF WBC: CPT

## 2020-08-17 PROCEDURE — 81001 URINALYSIS AUTO W/SCOPE: CPT

## 2020-08-17 PROCEDURE — 36415 COLL VENOUS BLD VENIPUNCTURE: CPT

## 2020-10-05 ENCOUNTER — HOSPITAL ENCOUNTER (OUTPATIENT)
Dept: MAMMOGRAPHY | Facility: HOSPITAL | Age: 80
Discharge: HOME | End: 2020-10-05
Attending: SURGERY
Payer: MEDICARE

## 2020-10-05 ENCOUNTER — APPOINTMENT (OUTPATIENT)
Dept: LAB | Facility: HOSPITAL | Age: 80
End: 2020-10-05
Attending: INTERNAL MEDICINE
Payer: MEDICARE

## 2020-10-05 ENCOUNTER — HOSPITAL ENCOUNTER (OUTPATIENT)
Dept: BONE DENSITY | Facility: HOSPITAL | Age: 80
Discharge: HOME | End: 2020-10-05
Attending: INTERNAL MEDICINE
Payer: MEDICARE

## 2020-10-05 DIAGNOSIS — M81.0 OSTEOPOROSIS, UNSPECIFIED OSTEOPOROSIS TYPE, UNSPECIFIED PATHOLOGICAL FRACTURE PRESENCE: ICD-10-CM

## 2020-10-05 DIAGNOSIS — Z17.0 MALIGNANT NEOPLASM OF UPPER-OUTER QUADRANT OF RIGHT BREAST IN FEMALE, ESTROGEN RECEPTOR POSITIVE (HCC): ICD-10-CM

## 2020-10-05 DIAGNOSIS — C50.411 MALIGNANT NEOPLASM OF UPPER-OUTER QUADRANT OF RIGHT BREAST IN FEMALE, ESTROGEN RECEPTOR POSITIVE (HCC): ICD-10-CM

## 2020-10-05 DIAGNOSIS — D64.9 ANEMIA, UNSPECIFIED: Primary | ICD-10-CM

## 2020-10-05 DIAGNOSIS — D47.2 MONOCLONAL GAMMOPATHY: ICD-10-CM

## 2020-10-05 PROCEDURE — 80053 COMPREHEN METABOLIC PANEL: CPT

## 2020-10-05 PROCEDURE — 84165 PROTEIN E-PHORESIS SERUM: CPT

## 2020-10-05 PROCEDURE — 82728 ASSAY OF FERRITIN: CPT

## 2020-10-05 PROCEDURE — 86334 IMMUNOFIX E-PHORESIS SERUM: CPT

## 2020-10-05 PROCEDURE — 82607 VITAMIN B-12: CPT

## 2020-10-05 PROCEDURE — 85025 COMPLETE CBC W/AUTO DIFF WBC: CPT

## 2020-10-05 PROCEDURE — 77066 DX MAMMO INCL CAD BI: CPT | Performed by: SURGERY

## 2020-10-05 PROCEDURE — 36415 COLL VENOUS BLD VENIPUNCTURE: CPT

## 2020-10-05 PROCEDURE — 83540 ASSAY OF IRON: CPT

## 2020-10-05 PROCEDURE — 77062 BREAST TOMOSYNTHESIS BI: CPT | Performed by: SURGERY

## 2020-10-05 PROCEDURE — 84466 ASSAY OF TRANSFERRIN: CPT

## 2020-10-05 PROCEDURE — 83883 ASSAY NEPHELOMETRY NOT SPEC: CPT

## 2020-10-07 ENCOUNTER — OFFICE VISIT (OUTPATIENT)
Dept: HEMATOLOGY/ONCOLOGY | Facility: HOSPITAL | Age: 80
End: 2020-10-07
Attending: INTERNAL MEDICINE
Payer: MEDICARE

## 2020-10-07 ENCOUNTER — OFFICE VISIT (OUTPATIENT)
Dept: SURGERY | Facility: CLINIC | Age: 80
End: 2020-10-07
Payer: MEDICARE

## 2020-10-07 VITALS
DIASTOLIC BLOOD PRESSURE: 74 MMHG | WEIGHT: 127 LBS | RESPIRATION RATE: 16 BRPM | OXYGEN SATURATION: 98 % | HEIGHT: 63 IN | TEMPERATURE: 97 F | BODY MASS INDEX: 22.5 KG/M2 | HEART RATE: 71 BPM | SYSTOLIC BLOOD PRESSURE: 130 MMHG

## 2020-10-07 VITALS
SYSTOLIC BLOOD PRESSURE: 130 MMHG | DIASTOLIC BLOOD PRESSURE: 74 MMHG | OXYGEN SATURATION: 98 % | HEART RATE: 71 BPM | RESPIRATION RATE: 16 BRPM

## 2020-10-07 DIAGNOSIS — M81.0 OSTEOPOROSIS, UNSPECIFIED OSTEOPOROSIS TYPE, UNSPECIFIED PATHOLOGICAL FRACTURE PRESENCE: ICD-10-CM

## 2020-10-07 DIAGNOSIS — Z17.0 MALIGNANT NEOPLASM OF RIGHT BREAST IN FEMALE, ESTROGEN RECEPTOR POSITIVE, UNSPECIFIED SITE OF BREAST (HCC): ICD-10-CM

## 2020-10-07 DIAGNOSIS — C50.411 MALIGNANT NEOPLASM OF UPPER-OUTER QUADRANT OF RIGHT BREAST IN FEMALE, ESTROGEN RECEPTOR POSITIVE (HCC): Primary | ICD-10-CM

## 2020-10-07 DIAGNOSIS — D47.2 MONOCLONAL GAMMOPATHY: Primary | ICD-10-CM

## 2020-10-07 DIAGNOSIS — Z79.899 ENCOUNTER FOR MONITORING ADJUVANT HORMONAL THERAPY: ICD-10-CM

## 2020-10-07 DIAGNOSIS — Z17.0 MALIGNANT NEOPLASM OF UPPER-OUTER QUADRANT OF RIGHT BREAST IN FEMALE, ESTROGEN RECEPTOR POSITIVE (HCC): Primary | ICD-10-CM

## 2020-10-07 DIAGNOSIS — C50.911 MALIGNANT NEOPLASM OF RIGHT BREAST IN FEMALE, ESTROGEN RECEPTOR POSITIVE, UNSPECIFIED SITE OF BREAST (HCC): ICD-10-CM

## 2020-10-07 DIAGNOSIS — Z51.81 ENCOUNTER FOR MONITORING ADJUVANT HORMONAL THERAPY: ICD-10-CM

## 2020-10-07 PROCEDURE — 3078F DIAST BP <80 MM HG: CPT | Performed by: INTERNAL MEDICINE

## 2020-10-07 PROCEDURE — 3008F BODY MASS INDEX DOCD: CPT | Performed by: INTERNAL MEDICINE

## 2020-10-07 PROCEDURE — 99214 OFFICE O/P EST MOD 30 MIN: CPT | Performed by: SURGERY

## 2020-10-07 PROCEDURE — 3075F SYST BP GE 130 - 139MM HG: CPT | Performed by: SURGERY

## 2020-10-07 PROCEDURE — 3075F SYST BP GE 130 - 139MM HG: CPT | Performed by: INTERNAL MEDICINE

## 2020-10-07 PROCEDURE — 99214 OFFICE O/P EST MOD 30 MIN: CPT | Performed by: INTERNAL MEDICINE

## 2020-10-07 PROCEDURE — 3078F DIAST BP <80 MM HG: CPT | Performed by: SURGERY

## 2020-10-07 NOTE — PROGRESS NOTES
Cancer Center Progress Note    Patient Name: Adriana Mckeon   YOB: 1940   Medical Record Number: [id number]   Attending Physician: Abdi Beckham M.D.      Chief Complaint:  hormone receptor positive right breast cancer    History of Present Illn URETEROSCOPY Right 1/4/2019    Performed by Ana Lewis MD at [facility] MAIN OR   • CYSTOSCOPY,INSERT URETERAL STENT     • ENDOSCOPIC RETROGRADE CHOLANGIOPANCREATOGRAPHY (ERCP) N/A 8/2/2017    Performed by Ramiro Arana MD at [facility] ENDOSCOPY   • ENDOSCOPI Social connections        Talks on phone: Not on file        Gets together: Not on file        Attends Alevism service: Not on file        Active member of club or organization: Not on file        Attends meetings of clubs or organizations: Not on file Soft, non tender. Extremities: No edema. Neurological: 5/5 motor x4.         Laboratory:  Recent Labs   Lab 10/05/20  1151   WBC 6.6   HGB 12.0   .0   NE 3.66         Lab Results   Component Value Date    GLU 88 10/05/2020    BUN 22 (H) 10/05/2020 was assessed and resources were discussed. Appropriate resources were reviewed and discussed with the pateint and family.          Artem Holland MD

## 2020-10-07 NOTE — PROGRESS NOTES
Breast Surgery Surveillance    Diagnosis: Right breast cancer status post lumpectomy on August 13, 2018.     Stage: Cancer Staging  Malignant neoplasm of right breast in female, estrogen receptor positive ([facility] [address]  Staging form: Breast, AJCC 8 kidney    • Cancer [facility]) 08/2018    right breast Ca   • Colitis     completed antibiotic therapy for C. Diff colitis   • Colon polyps    • Depression    • High cholesterol    • Incontinence    • Other general symptoms(780.99)     torn rotator cuff left arm Disp: , Rfl:     •  atorvastatin 20 MG Oral Tab, TK 1 T PO QD, Disp: , Rfl: 1    •  ClonazePAM 0.5 MG Oral Tab, pt takes 1 tab in the morning and 2 tabs in the evening, Disp: , Rfl: 2    •  VITAMIN D, CHOLECALCIFEROL, OR, Take 1 tablet by mouth daily. , Dis palpitations, irregular heartbeat, fainting or near-fainting, difficulty breathing when lying flat, SOB/Coughing at night, swelling of the legs or chest pain while walking.     Breasts:  See history of present illness    Gastrointestinal:     There is no hi not palpable cervical, supraclavicular or axillary adenopathy. The neck is supple with a midline trachea and no thyromegaly. Range of motion is good at both shoulders.  Breasts are symmetrical. The tumorectomy site is in the upper outer right  breast and s

## 2020-10-08 ENCOUNTER — APPOINTMENT (OUTPATIENT)
Dept: HEMATOLOGY/ONCOLOGY | Facility: HOSPITAL | Age: 80
End: 2020-10-08
Attending: INTERNAL MEDICINE
Payer: MEDICARE

## 2020-11-19 ENCOUNTER — TELEPHONE (OUTPATIENT)
Dept: SURGERY | Facility: CLINIC | Age: 80
End: 2020-11-19

## 2020-11-19 NOTE — TELEPHONE ENCOUNTER
Patient left message regarding her upcoming appointment with Dr. Jamal Pedroza. Returned her call and verified dates and time of mammogram, follow up with dr. Jamal Pedroza, and follow up with Dr. Nathaly Raymundo on same day.   She then asked about some elevated blood tests, and I

## 2020-11-20 RX ORDER — LETROZOLE 2.5 MG/1
TABLET, FILM COATED ORAL
Qty: 90 TABLET | Refills: 1 | Status: SHIPPED | OUTPATIENT
Start: 2020-11-20 | End: 2021-07-06

## 2021-01-07 ENCOUNTER — TELEPHONE (OUTPATIENT)
Dept: INTERNAL MEDICINE CLINIC | Facility: CLINIC | Age: 81
End: 2021-01-07

## 2021-01-07 DIAGNOSIS — M81.0 AGE-RELATED OSTEOPOROSIS WITHOUT CURRENT PATHOLOGICAL FRACTURE: Primary | ICD-10-CM

## 2021-01-07 PROBLEM — K85.10 ACUTE BILIARY PANCREATITIS, UNSPECIFIED COMPLICATION STATUS (HCC): Status: RESOLVED | Noted: 2017-05-18 | Resolved: 2021-01-07

## 2021-01-07 PROBLEM — M51.26 LUMBAR DISC HERNIATION: Status: ACTIVE | Noted: 2021-01-07

## 2021-01-07 PROBLEM — E78.5 HYPERLIPIDEMIA: Status: RESOLVED | Noted: 2019-08-19 | Resolved: 2021-01-07

## 2021-01-07 PROBLEM — K85.10 ACUTE BILIARY PANCREATITIS, UNSPECIFIED COMPLICATION STATUS: Status: RESOLVED | Noted: 2017-05-18 | Resolved: 2021-01-07

## 2021-01-07 NOTE — PROGRESS NOTES
Sona Jameson is a 78year old female.   Patient presents with:  Establish Care      HPI:   New pt - referred  By August Wu her ex DIL   Used to see dr Clyde Trent in [place]   C/c establish care   C/o  April 6th mammogram and next day has apt with [employer] standard drinks      Types: 1 Glasses of wine per week      Comment: occasionally    Drug use: No       REVIEW OF SYSTEMS:   GENERAL HEALTH: No fevers, chills, sweats, fatigue  VISION: No recent vision problems, blurry vision or double vision  RESPIRATORY:

## 2021-01-07 NOTE — TELEPHONE ENCOUNTER
Mirna Hoffman states due to the this Dr retiring the patient already has all her medical records from the office, unfortunately there is nothing to fax over.

## 2021-02-03 NOTE — TELEPHONE ENCOUNTER
Spoke with patient--states she was told by previous PCP office that all her records are in system--confirmed labs, tests and office visit notes are visible in Epic.     Patient requesting Dr. Luanne Tobar to refer her to different endocrinologist other than

## 2021-02-17 ENCOUNTER — TELEPHONE (OUTPATIENT)
Dept: INTERNAL MEDICINE CLINIC | Facility: CLINIC | Age: 81
End: 2021-02-17

## 2021-02-17 NOTE — TELEPHONE ENCOUNTER
Calling as a newly established pt with Dr. Jennifer Lr. Asking for Covid Vaccine and asking if the other provider's in our system will have the access to her results and all information. She is concerned if they will be able to view her scheduled Dexa Scan.

## 2021-02-18 NOTE — TELEPHONE ENCOUNTER
Pt called back with form Dr hesham Escalona, crying Stefanie Valera feels she has been over looked and she have been with this practice 30 years-stated you would think this means something      Decline to call the [facility]

## 2021-02-18 NOTE — TELEPHONE ENCOUNTER
Spoke with patient ( verified)--reports she just spoke to her former PCP (could not recall his name) , Shimon Dias, his nurse, told me as of today, physicians can write out prescriptions for the Covid vaccine.  So I am calling to ask Dr. Ambrose Marks to write

## 2021-02-19 NOTE — TELEPHONE ENCOUNTER
Spoke with patient ( verified) and relayed [facility] physicians are not writing prescriptions for Covid vaccine--Covid vaccine update sent to patient for f/u--patient verbalizes understanding and agreement. No further questions/concerns at this time.

## 2021-02-23 ENCOUNTER — OFFICE VISIT (OUTPATIENT)
Dept: INTERNAL MEDICINE CLINIC | Facility: CLINIC | Age: 81
End: 2021-02-23
Payer: MEDICARE

## 2021-02-23 VITALS
HEART RATE: 69 BPM | HEIGHT: 63 IN | WEIGHT: 128 LBS | RESPIRATION RATE: 16 BRPM | DIASTOLIC BLOOD PRESSURE: 82 MMHG | SYSTOLIC BLOOD PRESSURE: 134 MMHG | BODY MASS INDEX: 22.68 KG/M2

## 2021-02-23 DIAGNOSIS — M54.41 CHRONIC BILATERAL LOW BACK PAIN WITH RIGHT-SIDED SCIATICA: Primary | ICD-10-CM

## 2021-02-23 DIAGNOSIS — G89.29 CHRONIC BILATERAL LOW BACK PAIN WITH RIGHT-SIDED SCIATICA: Primary | ICD-10-CM

## 2021-02-23 PROCEDURE — 99213 OFFICE O/P EST LOW 20 MIN: CPT | Performed by: INTERNAL MEDICINE

## 2021-02-23 PROCEDURE — 3008F BODY MASS INDEX DOCD: CPT | Performed by: INTERNAL MEDICINE

## 2021-02-23 PROCEDURE — 3075F SYST BP GE 130 - 139MM HG: CPT | Performed by: INTERNAL MEDICINE

## 2021-02-23 PROCEDURE — 3079F DIAST BP 80-89 MM HG: CPT | Performed by: INTERNAL MEDICINE

## 2021-02-23 NOTE — PROGRESS NOTES
Sona Arriaza is a 78year old female.   Patient presents with:  Back Pain: lower      HPI:   Pt comes for f/u  C/c back pain x 6 mns   C/o sits on the computer for hours   Has h/o herniated disk of L 3 L4  And L5   Started PT nov 17th 2020 and still josh Cancer [facility]) 08/2018    right breast Ca   • Colitis     completed antibiotic therapy for C. Diff colitis   • Colon polyps    • Depression    • High cholesterol    • Incontinence    • Other general symptoms(780.99)     torn rotator cuff left arm   • Visual i test negative bilaterally    ASSESSMENT AND PLAN:   Diagnoses and all orders for this visit:    Chronic bilateral low back pain with right-sided sciatica  -     MRI SPINE LUMBAR (CPT=72148);  Future    still going for PT but not improving   Will check MRI

## 2021-02-25 ENCOUNTER — HOSPITAL ENCOUNTER (OUTPATIENT)
Dept: BONE DENSITY | Facility: HOSPITAL | Age: 81
Discharge: HOME | End: 2021-02-25
Attending: INTERNAL MEDICINE
Payer: MEDICARE

## 2021-02-25 PROCEDURE — 77080 DXA BONE DENSITY AXIAL: CPT | Performed by: INTERNAL MEDICINE

## 2021-02-26 NOTE — IMAGING NOTE
PT TO /US DEPT SCOUTS COMPLETED   am by Yanna Vale pT C/O ORDER STATING HER\" mY SURGERY  IS  WHEN\"i WAS TOLD 830\" INSTRUCTED PT TO CONFER WITH AMB NURSE WHEN SHE RETURNS REGARDING SURGERY TIME IV PATENT WITH OVER 1000 ML WITH OVER FILL NOT negative Alert & oriented; no sensory, motor or coordination deficits, normal reflexes

## 2021-03-05 DIAGNOSIS — Z23 NEED FOR VACCINATION: ICD-10-CM

## 2021-03-08 ENCOUNTER — TELEPHONE (OUTPATIENT)
Dept: INTERNAL MEDICINE CLINIC | Facility: CLINIC | Age: 81
End: 2021-03-08

## 2021-03-08 DIAGNOSIS — M54.41 CHRONIC MIDLINE LOW BACK PAIN WITH RIGHT-SIDED SCIATICA: Primary | ICD-10-CM

## 2021-03-08 DIAGNOSIS — G89.29 CHRONIC MIDLINE LOW BACK PAIN WITH RIGHT-SIDED SCIATICA: Primary | ICD-10-CM

## 2021-03-08 NOTE — TELEPHONE ENCOUNTER
Pt calling to report someone left her a message to cancel her MRI since it has not been cleared by her insurance. Managed Care are you able to assist?    Pt states ok to leave VM if she doesn't answer.

## 2021-03-10 NOTE — TELEPHONE ENCOUNTER
Will refer to ortho -- referral placed   Especially if mri is not being covered   Did d/w pt that ML insubrunilda requires her to complete PT first

## 2021-03-10 NOTE — TELEPHONE ENCOUNTER
Hello,    [employer] plan has denied PA for MRI. Per health plan clinical does not meet medical guidelines. Please contact patient to re-direct care. At this time health plan has not sent out denial letter.  Once [facility] has received denial, we will forward to offic

## 2021-03-10 NOTE — TELEPHONE ENCOUNTER
Patient is calling looking for an update on the Referral for the MRI. She has not heard anything in the past 2 days. Can we please give her an update on the ASAP?  Patient needs to know if she needs to cancel her appointment on Saturday 3/13      Please

## 2021-03-13 ENCOUNTER — TELEPHONE (OUTPATIENT)
Dept: ENDOCRINOLOGY CLINIC | Facility: CLINIC | Age: 81
End: 2021-03-13

## 2021-03-13 ENCOUNTER — LAB ENCOUNTER (OUTPATIENT)
Dept: LAB | Facility: HOSPITAL | Age: 81
End: 2021-03-13
Attending: INTERNAL MEDICINE
Payer: MEDICARE

## 2021-03-13 ENCOUNTER — OFFICE VISIT (OUTPATIENT)
Dept: ENDOCRINOLOGY CLINIC | Facility: CLINIC | Age: 81
End: 2021-03-13
Payer: MEDICARE

## 2021-03-13 VITALS
SYSTOLIC BLOOD PRESSURE: 132 MMHG | HEART RATE: 68 BPM | WEIGHT: 128 LBS | BODY MASS INDEX: 23 KG/M2 | DIASTOLIC BLOOD PRESSURE: 69 MMHG

## 2021-03-13 DIAGNOSIS — E55.9 VITAMIN D DEFICIENCY: ICD-10-CM

## 2021-03-13 DIAGNOSIS — E55.9 VITAMIN D DEFICIENCY: Primary | ICD-10-CM

## 2021-03-13 DIAGNOSIS — M81.0 AGE-RELATED OSTEOPOROSIS WITHOUT CURRENT PATHOLOGICAL FRACTURE: ICD-10-CM

## 2021-03-13 LAB
ALBUMIN SERPL-MCNC: 4 G/DL (ref 3.4–5)
ALBUMIN/GLOB SERPL: 1.1 {RATIO} (ref 1–2)
ALP LIVER SERPL-CCNC: 93 U/L
ALT SERPL-CCNC: 24 U/L
ANION GAP SERPL CALC-SCNC: 5 MMOL/L (ref 0–18)
AST SERPL-CCNC: 25 U/L (ref 15–37)
BILIRUB SERPL-MCNC: 0.4 MG/DL (ref 0.1–2)
BUN BLD-MCNC: 23 MG/DL (ref 7–18)
BUN/CREAT SERPL: 32.9 (ref 10–20)
CALCIUM BLD-MCNC: 9.5 MG/DL (ref 8.5–10.1)
CHLORIDE SERPL-SCNC: 107 MMOL/L (ref 98–112)
CO2 SERPL-SCNC: 28 MMOL/L (ref 21–32)
CREAT BLD-MCNC: 0.7 MG/DL
GLOBULIN PLAS-MCNC: 3.7 G/DL (ref 2.8–4.4)
GLUCOSE BLD-MCNC: 93 MG/DL (ref 70–99)
M PROTEIN MFR SERPL ELPH: 7.7 G/DL (ref 6.4–8.2)
OSMOLALITY SERPL CALC.SUM OF ELEC: 293 MOSM/KG (ref 275–295)
PATIENT FASTING Y/N/NP: YES
POTASSIUM SERPL-SCNC: 4.1 MMOL/L (ref 3.5–5.1)
PTH-INTACT SERPL-MCNC: 71.6 PG/ML (ref 18.5–88)
SODIUM SERPL-SCNC: 140 MMOL/L (ref 136–145)

## 2021-03-13 PROCEDURE — 80053 COMPREHEN METABOLIC PANEL: CPT

## 2021-03-13 PROCEDURE — 3078F DIAST BP <80 MM HG: CPT | Performed by: INTERNAL MEDICINE

## 2021-03-13 PROCEDURE — 82306 VITAMIN D 25 HYDROXY: CPT

## 2021-03-13 PROCEDURE — 99204 OFFICE O/P NEW MOD 45 MIN: CPT | Performed by: INTERNAL MEDICINE

## 2021-03-13 PROCEDURE — 84080 ASSAY ALKALINE PHOSPHATASES: CPT

## 2021-03-13 PROCEDURE — 83970 ASSAY OF PARATHORMONE: CPT

## 2021-03-13 PROCEDURE — 3075F SYST BP GE 130 - 139MM HG: CPT | Performed by: INTERNAL MEDICINE

## 2021-03-13 PROCEDURE — 36415 COLL VENOUS BLD VENIPUNCTURE: CPT

## 2021-03-13 NOTE — H&P
New Patient Evaluation - History and Physical    CONSULT - Reason for Visit:  Osteoporosis  Requesting Physician: Dr. Hooker Keys:  Patient presents with:  Consult: Pt is present to establish care with the doctor        RAMON OR   • CYSTOSCOPY,INSERT URETERAL STENT     • ENDOSCOPIC RETROGRADE CHOLANGIOPANCREATOGRAPHY (ERCP) N/A 8/2/2017    Performed by Rhys Nava MD at [address] [place] [address] ENDOSCOPY   • ENDOSCOPIC RETROGRADE CHOLANGIOPANCREATOGRAPHY (ERCP) N/A 5/20/2017    Performed by Pa Year:   Transportation Needs:       Lack of Transportation (Medical):       Lack of Transportation (Non-Medical):   Physical Activity:       Days of Exercise per Week:       Minutes of Exercise per Session:   Stress:       Feeling of Stress :   Social [employer] weight gain or loss  Head: Atraumatic  Eyes:  normal conjunctivae, sclera. , normal sclera and normal pupils  Throat/Neck: normal sound to voice. Normal hearing, normal speech  Back: no kyphosis  Respiratory:  Speaking in full sentences, non-labored.  no inc history  C/w calcium supplementation in diet  Will decide on vitamin D replacement based on labs  Discussed good dietary intake of calcium  Fall precautions discussed  Resistance exercises     Patient had multiple questions that were answered in a lot of d

## 2021-03-15 ENCOUNTER — TELEPHONE (OUTPATIENT)
Dept: ENDOCRINOLOGY CLINIC | Facility: CLINIC | Age: 81
End: 2021-03-15

## 2021-03-15 LAB — 25(OH)D3 SERPL-MCNC: 43.5 NG/ML (ref 30–100)

## 2021-03-15 NOTE — TELEPHONE ENCOUNTER
Vit D normal: c/w current replacement  PTH a hormone that controsl vit d is normal  GFR normal    Okay for NV for prolia when approved  Thansk

## 2021-03-16 ENCOUNTER — HOSPITAL ENCOUNTER (OUTPATIENT)
Dept: GENERAL RADIOLOGY | Facility: HOSPITAL | Age: 81
Discharge: HOME | End: 2021-03-16
Attending: ORTHOPAEDIC SURGERY
Payer: MEDICARE

## 2021-03-16 ENCOUNTER — OFFICE VISIT (OUTPATIENT)
Dept: ORTHOPEDICS CLINIC | Facility: CLINIC | Age: 81
End: 2021-03-16
Payer: MEDICARE

## 2021-03-16 VITALS — HEIGHT: 62 IN | WEIGHT: 126 LBS | BODY MASS INDEX: 23.19 KG/M2

## 2021-03-16 DIAGNOSIS — M41.86 SCOLIOSIS OF LUMBAR REGION DUE TO DEGENERATIVE DISEASE OF SPINE IN ADULT: Primary | ICD-10-CM

## 2021-03-16 DIAGNOSIS — M54.50 LUMBAR PAIN: ICD-10-CM

## 2021-03-16 PROBLEM — M41.56 SCOLIOSIS OF LUMBAR REGION DUE TO DEGENERATIVE DISEASE OF SPINE IN ADULT: Status: ACTIVE | Noted: 2021-03-16

## 2021-03-16 LAB — BONE SPECIFIC ALKALINE PHOSPHATASE: 11.3 UG/L

## 2021-03-16 PROCEDURE — 99214 OFFICE O/P EST MOD 30 MIN: CPT | Performed by: ORTHOPAEDIC SURGERY

## 2021-03-16 PROCEDURE — 3008F BODY MASS INDEX DOCD: CPT | Performed by: ORTHOPAEDIC SURGERY

## 2021-03-16 PROCEDURE — 72100 X-RAY EXAM L-S SPINE 2/3 VWS: CPT | Performed by: ORTHOPAEDIC SURGERY

## 2021-03-16 NOTE — TELEPHONE ENCOUNTER
Would agree the patient take medication as she just had a bone density scan which showed severe osteoporosis last month

## 2021-03-16 NOTE — TELEPHONE ENCOUNTER
Called pt and pt's son to follow-up. Pt's listed number is disconnected and could not get through to pts son, his phone kept ringing without option to leave voicemail message.  Will send message on [facility].

## 2021-03-16 NOTE — H&P
NURSING INTAKE COMMENTS: Patient presents with:  Consult: lumbar and right hip pain ,patient has been going to PT for several months its not helping       HPI: This 78year old female presents today with complaints of low back pain with radiation to right LUMPECTOMY RIGHT  08/2018     Current Outpatient Medications   Medication Sig Dispense Refill   • LETROZOLE 2.5 MG Oral Tab TAKE 1 TABLET(2.5 MG) BY MOUTH DAILY 90 tablet 1   • atorvastatin 20 MG Oral Tab TK 1 T PO QD  1   • ClonazePAM 0.5 MG Oral Tab pt t neck: Atraumatic, normocephalic, PERRLA  Respiratory: Regular, unlabored breathing  Lymphatics: no LAD  Vascular: 2+ and symmetric pulses  Skin: intact and benign   Neurologic: sensation intact to light touch and motor strength intact grossly in affected e appropriate evaluation to exclude secondary causes.  * Low bone mass (T score between -1.0 and -2.5 at the femoral neck or spine) and a 10-year probability of a hip fracture > 3% or a 10-year probability of a major osteoporosis-related fracture > 20% based stretching and strengthening exercises for her lumbar spine and core. Recommend continued topical medications as needed for symptoms.   We also discussed lumbar spine MRI and referral to interventional pain specialist that the patient would like and she is

## 2021-03-16 NOTE — TELEPHONE ENCOUNTER
Dr. Branden Mazariegos, Viry Parham)  Spoke to patient to relay message below. Patient stated understanding regarding normal labs. Patient stated she is not interested in prolia (too many SE) at this time.   She stated her plan is to continue with OTC vitamin D and weigh

## 2021-03-16 NOTE — TELEPHONE ENCOUNTER
Noted and understand her concerns  However< I will recommend that she think about it more  Since her bmd is low and this increases her risk of fractures which are associated with increased morbidity and mortality  If she changes her mind, she can please le

## 2021-03-17 ENCOUNTER — TELEPHONE (OUTPATIENT)
Dept: ORTHOPEDICS CLINIC | Facility: CLINIC | Age: 81
End: 2021-03-17

## 2021-03-17 NOTE — TELEPHONE ENCOUNTER
barbara from dr. Brock Ashley office called. No longer pt's primary care physician. Received results. Dr. Eros Cornelius is the pt's primary doctor. Sending results back to the office.

## 2021-03-17 NOTE — TELEPHONE ENCOUNTER
Patient returned call states she is doing physical therapy and taking vitamin D 1000 units daily and ensure supplement. States if she runs into trouble or changes her mind about prolia she will call us back.     gus Gutierrez

## 2021-03-26 NOTE — TELEPHONE ENCOUNTER
PA for patient insurance must be completed via fax form. Completed form online and faxed to [phone]. Waiting response.

## 2021-03-30 NOTE — TELEPHONE ENCOUNTER
Received approval notice from [facility] for Prolia. Good for 3/26/21 to 3/26/22- two visits. Auth# [id number]    Please advise on next steps.  Ok to schedule NV?

## 2021-03-30 NOTE — TELEPHONE ENCOUNTER
Okay to do NV of patients wants prolia  I think patient had called to say that she is not sure if she wants to go ahead with prolia  Thanks

## 2021-03-30 NOTE — TELEPHONE ENCOUNTER
Called patient and states she saw an orthopedic surgeon who advised her on alternative treatment to Prolia which consisted of taking vitamin D and doing resistance exercises. Patient is not interested in taking Prolia at this time. Routed as INOCENCIO.

## 2021-03-31 ENCOUNTER — LAB ENCOUNTER (OUTPATIENT)
Dept: LAB | Facility: HOSPITAL | Age: 81
End: 2021-03-31
Attending: INTERNAL MEDICINE
Payer: MEDICARE

## 2021-03-31 DIAGNOSIS — C50.911 MALIGNANT NEOPLASM OF RIGHT BREAST IN FEMALE, ESTROGEN RECEPTOR POSITIVE, UNSPECIFIED SITE OF BREAST (HCC): ICD-10-CM

## 2021-03-31 DIAGNOSIS — Z17.0 MALIGNANT NEOPLASM OF RIGHT BREAST IN FEMALE, ESTROGEN RECEPTOR POSITIVE, UNSPECIFIED SITE OF BREAST (HCC): ICD-10-CM

## 2021-03-31 DIAGNOSIS — D47.2 MONOCLONAL GAMMOPATHY: ICD-10-CM

## 2021-03-31 PROCEDURE — 85025 COMPLETE CBC W/AUTO DIFF WBC: CPT

## 2021-03-31 PROCEDURE — 80053 COMPREHEN METABOLIC PANEL: CPT

## 2021-03-31 PROCEDURE — 83883 ASSAY NEPHELOMETRY NOT SPEC: CPT

## 2021-03-31 PROCEDURE — 36415 COLL VENOUS BLD VENIPUNCTURE: CPT

## 2021-03-31 PROCEDURE — 84165 PROTEIN E-PHORESIS SERUM: CPT

## 2021-03-31 PROCEDURE — 86334 IMMUNOFIX E-PHORESIS SERUM: CPT

## 2021-04-01 ENCOUNTER — TELEPHONE (OUTPATIENT)
Dept: HEMATOLOGY/ONCOLOGY | Facility: HOSPITAL | Age: 81
End: 2021-04-01

## 2021-04-01 NOTE — TELEPHONE ENCOUNTER
Patient called and is very concerned over her test results. She would like a call back as soon as possible for someone to explain them for her.

## 2021-04-02 NOTE — TELEPHONE ENCOUNTER
I returned call to June and let her know that her labs are stable and that Dr Lily Sam will explain it better when she comes in 4/7. She verbalized understanding.

## 2021-04-05 NOTE — PROGRESS NOTES
Breast Surgery Surveillance    Diagnosis: Right breast cancer status post lumpectomy on August 13, 2018.     Stage: Cancer Staging  Malignant neoplasm of right breast in female, estrogen receptor positive ([facility] [address]  Staging form: Breast, AJCC 8th Edition  - [place] Cancer [facility]) 08/2018    right breast Ca   • Colitis     completed antibiotic therapy for C. Diff colitis   • Colon polyps    • Depression    • High cholesterol    • Incontinence    • Other general symptoms(780.99)     torn rotator cuff left arm   • Visual i (SEE COMMENTS)    Comment:Pt states she had a mild seizure when Morphine was             administered too fast IM    Family History:   Family History   Problem Relation Age of Onset   • Cancer Father         throat ca   • Heart Disorder Mother    • Hyperte nausea, change in bowel habits, diarrhea, abdominal pain or vomiting blood.      Genitourinary:  The patient denies frequent urination,+ needing to get up at night to urinate, urinary hesitancy or retaining urine, painful urination, urinary incontinence, de are everted with no discharge. There is not dominant masses, focal nodularity or skin changes on either side. The abdomen is soft, flat and nontender, with no palpable masses or organomegaly.     Imaging:  Her most recent imaging was performed on April 6, 2

## 2021-04-06 ENCOUNTER — HOSPITAL ENCOUNTER (OUTPATIENT)
Dept: MAMMOGRAPHY | Facility: HOSPITAL | Age: 81
Discharge: HOME | End: 2021-04-06
Attending: SURGERY
Payer: MEDICARE

## 2021-04-06 DIAGNOSIS — Z17.0 MALIGNANT NEOPLASM OF UPPER-OUTER QUADRANT OF RIGHT BREAST IN FEMALE, ESTROGEN RECEPTOR POSITIVE (HCC): ICD-10-CM

## 2021-04-06 DIAGNOSIS — C50.411 MALIGNANT NEOPLASM OF UPPER-OUTER QUADRANT OF RIGHT BREAST IN FEMALE, ESTROGEN RECEPTOR POSITIVE (HCC): ICD-10-CM

## 2021-04-06 PROCEDURE — 77065 DX MAMMO INCL CAD UNI: CPT | Performed by: SURGERY

## 2021-04-06 PROCEDURE — 77061 BREAST TOMOSYNTHESIS UNI: CPT | Performed by: SURGERY

## 2021-04-07 ENCOUNTER — OFFICE VISIT (OUTPATIENT)
Dept: HEMATOLOGY/ONCOLOGY | Facility: HOSPITAL | Age: 81
End: 2021-04-07
Attending: INTERNAL MEDICINE
Payer: MEDICARE

## 2021-04-07 ENCOUNTER — OFFICE VISIT (OUTPATIENT)
Dept: SURGERY | Facility: CLINIC | Age: 81
End: 2021-04-07
Payer: MEDICARE

## 2021-04-07 VITALS
HEIGHT: 63 IN | RESPIRATION RATE: 20 BRPM | TEMPERATURE: 99 F | DIASTOLIC BLOOD PRESSURE: 61 MMHG | BODY MASS INDEX: 22.15 KG/M2 | SYSTOLIC BLOOD PRESSURE: 132 MMHG | WEIGHT: 125 LBS | HEART RATE: 75 BPM | OXYGEN SATURATION: 96 %

## 2021-04-07 VITALS
WEIGHT: 125 LBS | DIASTOLIC BLOOD PRESSURE: 61 MMHG | SYSTOLIC BLOOD PRESSURE: 132 MMHG | OXYGEN SATURATION: 96 % | HEIGHT: 63 IN | TEMPERATURE: 99 F | RESPIRATION RATE: 20 BRPM | HEART RATE: 75 BPM | BODY MASS INDEX: 22.15 KG/M2

## 2021-04-07 DIAGNOSIS — Z17.0 MALIGNANT NEOPLASM OF UPPER-OUTER QUADRANT OF RIGHT BREAST IN FEMALE, ESTROGEN RECEPTOR POSITIVE (HCC): Primary | ICD-10-CM

## 2021-04-07 DIAGNOSIS — C50.911 MALIGNANT NEOPLASM OF RIGHT BREAST IN FEMALE, ESTROGEN RECEPTOR POSITIVE, UNSPECIFIED SITE OF BREAST (HCC): Primary | ICD-10-CM

## 2021-04-07 DIAGNOSIS — Z17.0 MALIGNANT NEOPLASM OF RIGHT BREAST IN FEMALE, ESTROGEN RECEPTOR POSITIVE, UNSPECIFIED SITE OF BREAST (HCC): Primary | ICD-10-CM

## 2021-04-07 DIAGNOSIS — C50.411 MALIGNANT NEOPLASM OF UPPER-OUTER QUADRANT OF RIGHT BREAST IN FEMALE, ESTROGEN RECEPTOR POSITIVE (HCC): Primary | ICD-10-CM

## 2021-04-07 DIAGNOSIS — Z79.899 ENCOUNTER FOR MONITORING ADJUVANT HORMONAL THERAPY: ICD-10-CM

## 2021-04-07 DIAGNOSIS — M81.0 OSTEOPOROSIS, UNSPECIFIED OSTEOPOROSIS TYPE, UNSPECIFIED PATHOLOGICAL FRACTURE PRESENCE: ICD-10-CM

## 2021-04-07 DIAGNOSIS — Z51.81 ENCOUNTER FOR MONITORING ADJUVANT HORMONAL THERAPY: ICD-10-CM

## 2021-04-07 DIAGNOSIS — D47.2 MONOCLONAL GAMMOPATHY: ICD-10-CM

## 2021-04-07 PROCEDURE — 3008F BODY MASS INDEX DOCD: CPT | Performed by: INTERNAL MEDICINE

## 2021-04-07 PROCEDURE — 3075F SYST BP GE 130 - 139MM HG: CPT | Performed by: INTERNAL MEDICINE

## 2021-04-07 PROCEDURE — 3078F DIAST BP <80 MM HG: CPT | Performed by: SURGERY

## 2021-04-07 PROCEDURE — 99214 OFFICE O/P EST MOD 30 MIN: CPT | Performed by: INTERNAL MEDICINE

## 2021-04-07 PROCEDURE — 3075F SYST BP GE 130 - 139MM HG: CPT | Performed by: SURGERY

## 2021-04-07 PROCEDURE — 3008F BODY MASS INDEX DOCD: CPT | Performed by: SURGERY

## 2021-04-07 PROCEDURE — 3078F DIAST BP <80 MM HG: CPT | Performed by: INTERNAL MEDICINE

## 2021-04-07 PROCEDURE — 99214 OFFICE O/P EST MOD 30 MIN: CPT | Performed by: SURGERY

## 2021-04-07 NOTE — PROGRESS NOTES
Cancer Center Progress Note    Patient Name: Leni Sung   YOB: 1940   Medical Record Number: [id number]   Attending Physician: Dhara Richard M.D.      Chief Complaint:  hormone receptor positive right breast cancer    History of Present Illn CYSTOSCOPY,INSERT URETERAL STENT     • ENDOSCOPIC RETROGRADE CHOLANGIOPANCREATOGRAPHY (ERCP) N/A 8/2/2017    Performed by Cici Macedo MD at [facility] ENDOSCOPY   • ENDOSCOPIC RETROGRADE CHOLANGIOPANCREATOGRAPHY (ERCP) N/A 5/20/2017    Performed by Julius Fitzgerald Transportation (Non-Medical):   Physical Activity:       Days of Exercise per Week:       Minutes of Exercise per Session:   Stress:       Feeling of Stress :   Social Connections:       Frequency of Communication with Friends and Family:       Frequency o Laboratory:  Recent Labs   Lab 03/31/21  1047   WBC 7.1   HGB 12.1   .0   NE 4.01         Lab Results   Component Value Date    GLU 89 03/31/2021    BUN 26 (H) 03/31/2021    BUNCREA 31.7 (H) 03/31/2021    CREATSERUM 0.82 03/31/2021    ANIONGAP Shanita Barnett MD

## 2021-04-27 RX ORDER — CLONAZEPAM 0.5 MG/1
TABLET ORAL
Qty: 270 TABLET | Refills: 0 | Status: SHIPPED | OUTPATIENT
Start: 2021-04-27 | End: 2021-08-09

## 2021-04-27 NOTE — TELEPHONE ENCOUNTER
Patient called and advised we should be getting a request from the pharmacy to refill the medication noted below. However, what I have listed in this message is an older prescription.  Patient advised she takes 2 - 3 tablet by mouth nightly and she needs a

## 2021-07-06 RX ORDER — LETROZOLE 2.5 MG/1
2.5 TABLET, FILM COATED ORAL DAILY
Qty: 90 TABLET | Refills: 1 | OUTPATIENT
Start: 2021-07-06

## 2021-07-06 RX ORDER — LETROZOLE 2.5 MG/1
TABLET, FILM COATED ORAL
Qty: 90 TABLET | Refills: 1 | Status: SHIPPED | OUTPATIENT
Start: 2021-07-06 | End: 2022-02-04

## 2021-07-06 RX ORDER — LETROZOLE 2.5 MG/1
TABLET, FILM COATED ORAL
Qty: 90 TABLET | Refills: 1 | OUTPATIENT
Start: 2021-07-06

## 2021-08-09 RX ORDER — CLONAZEPAM 0.5 MG/1
TABLET ORAL
Qty: 270 TABLET | Refills: 0 | Status: SHIPPED | OUTPATIENT
Start: 2021-08-09 | End: 2021-11-15

## 2021-08-09 NOTE — TELEPHONE ENCOUNTER
Patient called and advised that she needs a rush on this refill. Patient advised she only has enough for Tonight. After this the Patient will be Completley Out of the Medication. Please Advise.

## 2021-08-09 NOTE — TELEPHONE ENCOUNTER
Please review protocol failed/ No protocol    Requested Prescriptions   Pending Prescriptions Disp Refills    CLONAZEPAM 0.5 MG Oral Tab [Pharmacy Med Name: CLONAZEPAM 0.5MG TABLETS] 270 tablet 0     Sig: TAKE 1 TABLET BY MOUTH EVERY MORNING AND 2 TABLETS

## 2021-09-21 ENCOUNTER — TELEPHONE (OUTPATIENT)
Dept: INTERNAL MEDICINE CLINIC | Facility: CLINIC | Age: 81
End: 2021-09-21

## 2021-09-21 NOTE — TELEPHONE ENCOUNTER
Spoke with patient ( verified)--reports over the weekend, she took a train ride with friends to [place], [place] (3 hours each way). Patient reports she did do a lot of walking--in and out of the car.  Patient denies fall or trauma, but did notice multipl

## 2021-09-22 NOTE — TELEPHONE ENCOUNTER
Future Appointments   Date Time Provider [place]ola [place]   9/24/2021 12:20 PM Ana Eng MD [facility]   10/13/2021 10:20 AM [address]   10/13/2021 12:45 PM Mikey Montalvo MD [facility]   10/13/2021  1:00

## 2021-09-22 NOTE — TELEPHONE ENCOUNTER
Patient calling again in regards to message below , states the bruises on her legs are improving but remain present   Please advise and thank you.   Please reply to pool: JORGE ALBERTO Avalos

## 2021-09-22 NOTE — TELEPHONE ENCOUNTER
Spoke with patient (name and  verified), informed of message below. Patient scheduled for in office appointment.

## 2021-09-24 ENCOUNTER — LAB ENCOUNTER (OUTPATIENT)
Dept: LAB | Facility: HOSPITAL | Age: 81
End: 2021-09-24
Attending: INTERNAL MEDICINE
Payer: MEDICARE

## 2021-09-24 ENCOUNTER — OFFICE VISIT (OUTPATIENT)
Dept: INTERNAL MEDICINE CLINIC | Facility: CLINIC | Age: 81
End: 2021-09-24
Payer: MEDICARE

## 2021-09-24 VITALS
WEIGHT: 124 LBS | SYSTOLIC BLOOD PRESSURE: 127 MMHG | BODY MASS INDEX: 21.97 KG/M2 | HEIGHT: 63 IN | RESPIRATION RATE: 16 BRPM | HEART RATE: 71 BPM | DIASTOLIC BLOOD PRESSURE: 77 MMHG

## 2021-09-24 DIAGNOSIS — M81.0 AGE-RELATED OSTEOPOROSIS WITHOUT CURRENT PATHOLOGICAL FRACTURE: ICD-10-CM

## 2021-09-24 DIAGNOSIS — D47.2 GAMMOPATHY: ICD-10-CM

## 2021-09-24 DIAGNOSIS — T14.8XXA BRUISING: ICD-10-CM

## 2021-09-24 DIAGNOSIS — M51.26 LUMBAR DISC HERNIATION: ICD-10-CM

## 2021-09-24 DIAGNOSIS — C50.911 MALIGNANT NEOPLASM OF RIGHT BREAST IN FEMALE, ESTROGEN RECEPTOR POSITIVE, UNSPECIFIED SITE OF BREAST (HCC): ICD-10-CM

## 2021-09-24 DIAGNOSIS — Z17.0 MALIGNANT NEOPLASM OF RIGHT BREAST IN FEMALE, ESTROGEN RECEPTOR POSITIVE, UNSPECIFIED SITE OF BREAST (HCC): ICD-10-CM

## 2021-09-24 DIAGNOSIS — T14.8XXA BRUISING: Primary | ICD-10-CM

## 2021-09-24 PROCEDURE — 99214 OFFICE O/P EST MOD 30 MIN: CPT | Performed by: INTERNAL MEDICINE

## 2021-09-24 PROCEDURE — 85610 PROTHROMBIN TIME: CPT

## 2021-09-24 PROCEDURE — 84443 ASSAY THYROID STIM HORMONE: CPT

## 2021-09-24 PROCEDURE — 3008F BODY MASS INDEX DOCD: CPT | Performed by: INTERNAL MEDICINE

## 2021-09-24 PROCEDURE — 82746 ASSAY OF FOLIC ACID SERUM: CPT

## 2021-09-24 PROCEDURE — 36415 COLL VENOUS BLD VENIPUNCTURE: CPT

## 2021-09-24 PROCEDURE — 85730 THROMBOPLASTIN TIME PARTIAL: CPT

## 2021-09-24 PROCEDURE — 3074F SYST BP LT 130 MM HG: CPT | Performed by: INTERNAL MEDICINE

## 2021-09-24 PROCEDURE — 3078F DIAST BP <80 MM HG: CPT | Performed by: INTERNAL MEDICINE

## 2021-09-24 PROCEDURE — 82607 VITAMIN B-12: CPT

## 2021-09-24 NOTE — PROGRESS NOTES
Sona Schmidt is a 78year old female.   Patient presents with:  Bruising: both legs      HPI:   Pt comes as an urgent visit   C/c bruises on the legs   C/o  history reviewed from nurse and confirmed with pt   reports over the weekend, she took a train rid de[place] her ex DIL   Used to see dr Anna Kimbrough in [place]   C/c establish care   C/o   mammogram and next day has apt with dr Marixa Sweeney and dr Osmani Centeno     in    Has encough refills   Has questions about her blood work and her her monoclo Vaping Use      Vaping Use: Never used    Alcohol use:  Yes      Alcohol/week: 1.0 standard drink      Types: 1 Glasses of wine per week      Comment: occasionally    Drug use: No       REVIEW OF SYSTEMS:   GENERAL HEALTH: No fevers, chills, sweats,+  fatig February 25  Labs from 10/2020    Shingles shot -advises to asked the pharmacist  Pneumonia shot -thinks that she may have had at her previous doctor's office  Thinks that she had her Medicare annual wellness visit at her previous doctor's office in [place]

## 2021-10-08 ENCOUNTER — LAB ENCOUNTER (OUTPATIENT)
Dept: LAB | Facility: HOSPITAL | Age: 81
End: 2021-10-08
Attending: INTERNAL MEDICINE
Payer: MEDICARE

## 2021-10-08 DIAGNOSIS — D64.9 ANEMIA, UNSPECIFIED TYPE: ICD-10-CM

## 2021-10-08 DIAGNOSIS — D47.2 MONOCLONAL GAMMOPATHY: ICD-10-CM

## 2021-10-08 PROCEDURE — 84165 PROTEIN E-PHORESIS SERUM: CPT

## 2021-10-08 PROCEDURE — 84466 ASSAY OF TRANSFERRIN: CPT

## 2021-10-08 PROCEDURE — 86334 IMMUNOFIX E-PHORESIS SERUM: CPT

## 2021-10-08 PROCEDURE — 83883 ASSAY NEPHELOMETRY NOT SPEC: CPT

## 2021-10-08 PROCEDURE — 80053 COMPREHEN METABOLIC PANEL: CPT

## 2021-10-08 PROCEDURE — 36415 COLL VENOUS BLD VENIPUNCTURE: CPT

## 2021-10-08 PROCEDURE — 85025 COMPLETE CBC W/AUTO DIFF WBC: CPT

## 2021-10-08 PROCEDURE — 83540 ASSAY OF IRON: CPT

## 2021-10-13 ENCOUNTER — TELEPHONE (OUTPATIENT)
Dept: HEMATOLOGY/ONCOLOGY | Facility: HOSPITAL | Age: 81
End: 2021-10-13

## 2021-10-13 ENCOUNTER — HOSPITAL ENCOUNTER (OUTPATIENT)
Dept: MAMMOGRAPHY | Facility: HOSPITAL | Age: 81
Discharge: HOME | End: 2021-10-13
Attending: SURGERY
Payer: MEDICARE

## 2021-10-13 ENCOUNTER — OFFICE VISIT (OUTPATIENT)
Dept: HEMATOLOGY/ONCOLOGY | Facility: HOSPITAL | Age: 81
End: 2021-10-13
Attending: INTERNAL MEDICINE
Payer: MEDICARE

## 2021-10-13 ENCOUNTER — OFFICE VISIT (OUTPATIENT)
Dept: SURGERY | Facility: CLINIC | Age: 81
End: 2021-10-13
Payer: MEDICARE

## 2021-10-13 VITALS
HEART RATE: 71 BPM | DIASTOLIC BLOOD PRESSURE: 65 MMHG | HEIGHT: 63 IN | TEMPERATURE: 97 F | SYSTOLIC BLOOD PRESSURE: 115 MMHG | RESPIRATION RATE: 18 BRPM | WEIGHT: 123 LBS | BODY MASS INDEX: 21.79 KG/M2 | OXYGEN SATURATION: 97 %

## 2021-10-13 VITALS
HEIGHT: 63 IN | BODY MASS INDEX: 21.79 KG/M2 | TEMPERATURE: 97 F | RESPIRATION RATE: 18 BRPM | DIASTOLIC BLOOD PRESSURE: 65 MMHG | OXYGEN SATURATION: 97 % | WEIGHT: 123 LBS | HEART RATE: 71 BPM | SYSTOLIC BLOOD PRESSURE: 115 MMHG

## 2021-10-13 DIAGNOSIS — Z79.899 ENCOUNTER FOR MONITORING ADJUVANT HORMONAL THERAPY: ICD-10-CM

## 2021-10-13 DIAGNOSIS — D47.2 MONOCLONAL GAMMOPATHY: ICD-10-CM

## 2021-10-13 DIAGNOSIS — D64.9 ANEMIA, UNSPECIFIED TYPE: ICD-10-CM

## 2021-10-13 DIAGNOSIS — Z17.0 MALIGNANT NEOPLASM OF UPPER-OUTER QUADRANT OF RIGHT BREAST IN FEMALE, ESTROGEN RECEPTOR POSITIVE (HCC): Primary | ICD-10-CM

## 2021-10-13 DIAGNOSIS — M81.0 OSTEOPOROSIS, UNSPECIFIED OSTEOPOROSIS TYPE, UNSPECIFIED PATHOLOGICAL FRACTURE PRESENCE: ICD-10-CM

## 2021-10-13 DIAGNOSIS — C50.411 MALIGNANT NEOPLASM OF UPPER-OUTER QUADRANT OF RIGHT BREAST IN FEMALE, ESTROGEN RECEPTOR POSITIVE (HCC): Primary | ICD-10-CM

## 2021-10-13 DIAGNOSIS — Z51.81 ENCOUNTER FOR MONITORING ADJUVANT HORMONAL THERAPY: ICD-10-CM

## 2021-10-13 DIAGNOSIS — Z17.0 MALIGNANT NEOPLASM OF UPPER-OUTER QUADRANT OF RIGHT BREAST IN FEMALE, ESTROGEN RECEPTOR POSITIVE (HCC): ICD-10-CM

## 2021-10-13 DIAGNOSIS — Z17.0 MALIGNANT NEOPLASM OF RIGHT BREAST IN FEMALE, ESTROGEN RECEPTOR POSITIVE, UNSPECIFIED SITE OF BREAST (HCC): Primary | ICD-10-CM

## 2021-10-13 DIAGNOSIS — C50.911 MALIGNANT NEOPLASM OF RIGHT BREAST IN FEMALE, ESTROGEN RECEPTOR POSITIVE, UNSPECIFIED SITE OF BREAST (HCC): Primary | ICD-10-CM

## 2021-10-13 DIAGNOSIS — C50.411 MALIGNANT NEOPLASM OF UPPER-OUTER QUADRANT OF RIGHT BREAST IN FEMALE, ESTROGEN RECEPTOR POSITIVE (HCC): ICD-10-CM

## 2021-10-13 PROCEDURE — 3008F BODY MASS INDEX DOCD: CPT | Performed by: INTERNAL MEDICINE

## 2021-10-13 PROCEDURE — 3078F DIAST BP <80 MM HG: CPT | Performed by: INTERNAL MEDICINE

## 2021-10-13 PROCEDURE — 3078F DIAST BP <80 MM HG: CPT | Performed by: SURGERY

## 2021-10-13 PROCEDURE — 3008F BODY MASS INDEX DOCD: CPT | Performed by: SURGERY

## 2021-10-13 PROCEDURE — 3074F SYST BP LT 130 MM HG: CPT | Performed by: INTERNAL MEDICINE

## 2021-10-13 PROCEDURE — 99214 OFFICE O/P EST MOD 30 MIN: CPT | Performed by: INTERNAL MEDICINE

## 2021-10-13 PROCEDURE — 99214 OFFICE O/P EST MOD 30 MIN: CPT | Performed by: SURGERY

## 2021-10-13 PROCEDURE — 77062 BREAST TOMOSYNTHESIS BI: CPT | Performed by: SURGERY

## 2021-10-13 PROCEDURE — 77066 DX MAMMO INCL CAD BI: CPT | Performed by: SURGERY

## 2021-10-13 PROCEDURE — 3074F SYST BP LT 130 MM HG: CPT | Performed by: SURGERY

## 2021-10-13 NOTE — TELEPHONE ENCOUNTER
Call placed to patient. She was expecting call. Advised that iron sat is low and Dr Andria Castellanos is recommending oral iron 325 mg Ferrous Sulfate one tablet daily. Cautioned about GI upset and black stool. Advised to call if cannot tolerate.  She confirms [place]

## 2021-10-13 NOTE — PROGRESS NOTES
Cancer Center Progress Note    Patient Name: Mouna Jessica   YOB: 1940   Medical Record Number: [id number]   Attending Physician: Deep Bonner M.D.      Chief Complaint:  hormone receptor positive right breast cancer    History of Present Illn Relation Age of Onset   • Cancer Father         throat ca   • Heart Disorder Mother    • Hypertension Sister    • Heart Disorder Brother    • Breast Cancer Self         76   • Breast Cancer Paternal Aunt         age unknown/poss.  late 35s       Social Hist   Fear of Current or Ex-Partner: Not on file      Emotionally Abused: Not on file      Physically Abused: Not on file      Sexually Abused: Not on file  Housing Stability:       Unable to Pay for Housing in the Last Year: Not on file      Number of Places CREATSERUM 0.69 10/08/2021    ANIONGAP 5 10/08/2021    GFRNAA 81 10/08/2021    GFRAA 93 10/08/2021    CA 9.3 10/08/2021    OSMOCALC 293 10/08/2021    ALKPHO 79 10/08/2021    AST 23 10/08/2021    ALT 22 10/08/2021    BILT 0.4 10/08/2021    TP 7.1 10/08/2

## 2021-10-26 ENCOUNTER — TELEPHONE (OUTPATIENT)
Dept: INTERNAL MEDICINE CLINIC | Facility: CLINIC | Age: 81
End: 2021-10-26

## 2021-10-26 NOTE — TELEPHONE ENCOUNTER
Calling and states that she has made an appointment to her pharmacy today to  receive the booster shot, asking if this is ok with Dr Cortney Duffy,.   Patient's last COVID vaccine was 3/6/21 more than 6 months ago  And she is not on any prednisone, advised that i

## 2021-11-15 RX ORDER — CLONAZEPAM 0.5 MG/1
TABLET ORAL
Qty: 270 TABLET | Refills: 0 | Status: SHIPPED | OUTPATIENT
Start: 2021-11-15

## 2021-11-15 NOTE — TELEPHONE ENCOUNTER
Please review; protocol failed/no protocol    Requested Prescriptions   Pending Prescriptions Disp Refills    CLONAZEPAM 0.5 MG Oral Tab [Pharmacy Med Name: CLONAZEPAM 0.5MG TABLETS] 270 tablet 0     Sig: TAKE 1 TABLET BY MOUTH EVERY MORNING AND TAKE 2 TAB

## 2022-01-05 RX ORDER — ATORVASTATIN CALCIUM 20 MG/1
TABLET, FILM COATED ORAL
Qty: 90 TABLET | Refills: 0 | Status: SHIPPED | OUTPATIENT
Start: 2022-01-05

## 2022-02-04 RX ORDER — LETROZOLE 2.5 MG/1
TABLET, FILM COATED ORAL
Qty: 90 TABLET | Refills: 1 | Status: SHIPPED | OUTPATIENT
Start: 2022-02-04 | End: 2022-09-06

## 2022-03-11 NOTE — TELEPHONE ENCOUNTER
Protocol failed or has No Protocol, please review    Last office visit = 9/24/2021   Last refill = 11/15/2021    Requested Prescriptions   Pending Prescriptions Disp Refills    CLONAZEPAM 0.5 MG Oral Tab [Pharmacy Med Name: CLONAZEPAM 0.5MG TABLETS] 270 tablet 0     Sig: TAKE 1 TABLET BY MOUTH EVERY MORNING AND 2 TABLETS EVERY EVENING        There is no refill protocol information for this order         Future Appointments         Provider Department Appt Notes    In 1 month George Peterson MD [facility] Hematology Oncology FOLLOW UP VISIT. CL  6M          Recent Outpatient Visits              4 months ago Malignant neoplasm of upper-outer quadrant of right breast in female, estrogen receptor positive ([address]    Tania Toro MD    Office Visit    4 months ago Malignant neoplasm of right breast in female, estrogen receptor positive, unspecified site of breast [facility])    [facility] Hematology Oncology George Peterson MD    Office Visit    5 months ago Philip Adams MD    Office Visit    11 months ago Malignant neoplasm of upper-outer quadrant of right breast in female, estrogen receptor positive ([address]    [facility] Nikos Doshi MD    Office Visit    11 months ago Malignant neoplasm of right breast in female, estrogen receptor positive, unspecified site of breast [facility])    [facility] Hematology Oncology George Peterson MD    Office Visit

## 2022-03-12 RX ORDER — CLONAZEPAM 0.5 MG/1
TABLET ORAL
Qty: 270 TABLET | Refills: 0 | Status: SHIPPED | OUTPATIENT
Start: 2022-03-12

## 2022-03-14 ENCOUNTER — PATIENT MESSAGE (OUTPATIENT)
Dept: ADMINISTRATIVE | Age: 82
End: 2022-03-14

## 2022-03-14 NOTE — TELEPHONE ENCOUNTER
Call center please call and schedule an appointment. Thank You. Mychart message sent to the patient. Split-Thickness Skin Graft Text: The defect edges were debeveled with a #15 scalpel blade.  Given the location of the defect, shape of the defect and the proximity to free margins a split thickness skin graft was deemed most appropriate.  Using a sterile surgical marker, the primary defect shape was transferred to the donor site. The split thickness graft was then harvested.  The skin graft was then placed in the primary defect and oriented appropriately.

## 2022-03-17 ENCOUNTER — OFFICE VISIT (OUTPATIENT)
Dept: INTERNAL MEDICINE CLINIC | Facility: CLINIC | Age: 82
End: 2022-03-17
Payer: MEDICARE

## 2022-03-17 VITALS
RESPIRATION RATE: 15 BRPM | SYSTOLIC BLOOD PRESSURE: 120 MMHG | WEIGHT: 122 LBS | BODY MASS INDEX: 21.62 KG/M2 | HEIGHT: 63 IN | DIASTOLIC BLOOD PRESSURE: 68 MMHG | HEART RATE: 77 BPM

## 2022-03-17 DIAGNOSIS — D69.2 SENILE PURPURA (HCC): ICD-10-CM

## 2022-03-17 DIAGNOSIS — Z17.0 MALIGNANT NEOPLASM OF UPPER-OUTER QUADRANT OF RIGHT BREAST IN FEMALE, ESTROGEN RECEPTOR POSITIVE (HCC): ICD-10-CM

## 2022-03-17 DIAGNOSIS — M51.26 LUMBAR DISC HERNIATION: ICD-10-CM

## 2022-03-17 DIAGNOSIS — N20.0 STONE IN RENAL PELVIS: ICD-10-CM

## 2022-03-17 DIAGNOSIS — K80.50 CHOLEDOCHOLITHIASIS: ICD-10-CM

## 2022-03-17 DIAGNOSIS — I70.0 ATHEROSCLEROSIS OF AORTA (HCC): ICD-10-CM

## 2022-03-17 DIAGNOSIS — Z00.00 ENCOUNTER FOR ANNUAL HEALTH EXAMINATION: Primary | ICD-10-CM

## 2022-03-17 DIAGNOSIS — M75.112 INCOMPLETE TEAR OF LEFT ROTATOR CUFF, UNSPECIFIED WHETHER TRAUMATIC: ICD-10-CM

## 2022-03-17 DIAGNOSIS — Z17.0 MALIGNANT NEOPLASM OF RIGHT BREAST IN FEMALE, ESTROGEN RECEPTOR POSITIVE, UNSPECIFIED SITE OF BREAST (HCC): ICD-10-CM

## 2022-03-17 DIAGNOSIS — Z90.49 S/P LAPAROSCOPIC CHOLECYSTECTOMY: ICD-10-CM

## 2022-03-17 DIAGNOSIS — N20.0 MULTIPLE KIDNEY STONES: ICD-10-CM

## 2022-03-17 DIAGNOSIS — M81.0 AGE-RELATED OSTEOPOROSIS WITHOUT CURRENT PATHOLOGICAL FRACTURE: ICD-10-CM

## 2022-03-17 DIAGNOSIS — E78.5 HYPERLIPIDEMIA LDL GOAL <130: ICD-10-CM

## 2022-03-17 DIAGNOSIS — F41.9 ANXIETY DISORDER, UNSPECIFIED TYPE: ICD-10-CM

## 2022-03-17 DIAGNOSIS — C50.411 MALIGNANT NEOPLASM OF UPPER-OUTER QUADRANT OF RIGHT BREAST IN FEMALE, ESTROGEN RECEPTOR POSITIVE (HCC): ICD-10-CM

## 2022-03-17 DIAGNOSIS — K80.10 CHRONIC CHOLECYSTITIS WITH CALCULUS: ICD-10-CM

## 2022-03-17 DIAGNOSIS — C50.911 MALIGNANT NEOPLASM OF RIGHT BREAST IN FEMALE, ESTROGEN RECEPTOR POSITIVE, UNSPECIFIED SITE OF BREAST (HCC): ICD-10-CM

## 2022-03-17 DIAGNOSIS — M41.86 SCOLIOSIS OF LUMBAR REGION DUE TO DEGENERATIVE DISEASE OF SPINE IN ADULT: ICD-10-CM

## 2022-03-17 DIAGNOSIS — N20.0 RIGHT KIDNEY STONE: ICD-10-CM

## 2022-03-17 DIAGNOSIS — F13.20 SEDATIVE, HYPNOTIC OR ANXIOLYTIC DEPENDENCE, UNCOMPLICATED (HCC): ICD-10-CM

## 2022-03-17 PROCEDURE — G0439 PPPS, SUBSEQ VISIT: HCPCS | Performed by: INTERNAL MEDICINE

## 2022-03-17 PROCEDURE — 99397 PER PM REEVAL EST PAT 65+ YR: CPT | Performed by: INTERNAL MEDICINE

## 2022-03-17 PROCEDURE — 3008F BODY MASS INDEX DOCD: CPT | Performed by: INTERNAL MEDICINE

## 2022-03-17 PROCEDURE — 96160 PT-FOCUSED HLTH RISK ASSMT: CPT | Performed by: INTERNAL MEDICINE

## 2022-03-17 PROCEDURE — 3078F DIAST BP <80 MM HG: CPT | Performed by: INTERNAL MEDICINE

## 2022-03-17 PROCEDURE — 3074F SYST BP LT 130 MM HG: CPT | Performed by: INTERNAL MEDICINE

## 2022-03-20 PROBLEM — D69.2 SENILE PURPURA (HCC): Status: ACTIVE | Noted: 2022-03-20

## 2022-03-20 PROBLEM — D69.2 SENILE PURPURA: Status: ACTIVE | Noted: 2022-03-20

## 2022-03-20 PROBLEM — D47.2 GAMMOPATHY: Status: ACTIVE | Noted: 2022-03-20

## 2022-04-08 NOTE — TELEPHONE ENCOUNTER
Patient (name and  verified) calling asking about the [employer] message received regarding appt. Patient was seen by PCP on 3/17/22 and received a refill at that time. Explained to patient to disregard message, verbalized understanding.

## 2022-04-09 NOTE — TELEPHONE ENCOUNTER
Protocol failed or has No Protocol, please review  Requested Prescriptions   Pending Prescriptions Disp Refills    ATORVASTATIN 20 MG Oral Tab [Pharmacy Med Name: ATORVASTATIN 20MG TABLETS] 90 tablet 0     Sig: TAKE 1 TABLET BY MOUTH EVERY DAY        Cholesterol Medication Protocol Failed - 4/9/2022  8:51 AM        Failed - LDL in past 12 months        Failed - Last LDL < 130     Lab Results   Component Value Date    LDL 86 08/17/2020               Passed - ALT in past 12 months        Passed - Last ALT < 80       Lab Results   Component Value Date    ALT 22 10/08/2021             Passed - Appointment in past 12 or next 3 months            Future Appointments         Provider Department Appt Notes    In 6 days [address] Hematology Oncology 6M - getting labs at [facility] 1696    In 5 months Pacheco Dykes MD [facility], [facility], [address] [place] [address] 6M F/U          Recent Outpatient Visits              3 weeks ago Encounter for annual health examination    Aranza Fink MD    Office Visit    5 months ago Malignant neoplasm of upper-outer quadrant of right breast in female, estrogen receptor positive ([facility] [address]    Tyrel Trujillo MD    Office Visit    5 months ago Malignant neoplasm of right breast in female, estrogen receptor positive, unspecified site of breast [facility])    [facility] Hematology On[facility] Cali Shah MD    Office Visit    6 months ago Aranza Singh MD    Office Visit    1 year ago Malignant neoplasm of upper-outer quadrant of right breast in female, estrogen receptor positive ([address]    [facility] Karlie Calvert MD    Office Visit

## 2022-04-10 RX ORDER — ATORVASTATIN CALCIUM 20 MG/1
20 TABLET, FILM COATED ORAL DAILY
Qty: 90 TABLET | Refills: 1 | Status: SHIPPED | OUTPATIENT
Start: 2022-04-10

## 2022-04-13 ENCOUNTER — APPOINTMENT (OUTPATIENT)
Dept: HEMATOLOGY/ONCOLOGY | Facility: HOSPITAL | Age: 82
End: 2022-04-13
Attending: INTERNAL MEDICINE

## 2022-04-14 ENCOUNTER — LAB ENCOUNTER (OUTPATIENT)
Dept: LAB | Facility: HOSPITAL | Age: 82
End: 2022-04-14
Attending: INTERNAL MEDICINE
Payer: MEDICARE

## 2022-04-14 DIAGNOSIS — Z79.899 ENCOUNTER FOR MONITORING ADJUVANT HORMONAL THERAPY: ICD-10-CM

## 2022-04-14 DIAGNOSIS — D64.9 ANEMIA, UNSPECIFIED TYPE: ICD-10-CM

## 2022-04-14 DIAGNOSIS — C50.911 MALIGNANT NEOPLASM OF RIGHT BREAST IN FEMALE, ESTROGEN RECEPTOR POSITIVE, UNSPECIFIED SITE OF BREAST (HCC): ICD-10-CM

## 2022-04-14 DIAGNOSIS — M81.0 OSTEOPOROSIS, UNSPECIFIED OSTEOPOROSIS TYPE, UNSPECIFIED PATHOLOGICAL FRACTURE PRESENCE: ICD-10-CM

## 2022-04-14 DIAGNOSIS — E78.5 HYPERLIPIDEMIA LDL GOAL <130: ICD-10-CM

## 2022-04-14 DIAGNOSIS — Z51.81 ENCOUNTER FOR MONITORING ADJUVANT HORMONAL THERAPY: ICD-10-CM

## 2022-04-14 DIAGNOSIS — D47.2 MONOCLONAL GAMMOPATHY: ICD-10-CM

## 2022-04-14 DIAGNOSIS — Z17.0 MALIGNANT NEOPLASM OF RIGHT BREAST IN FEMALE, ESTROGEN RECEPTOR POSITIVE, UNSPECIFIED SITE OF BREAST (HCC): ICD-10-CM

## 2022-04-14 LAB
ALBUMIN SERPL-MCNC: 4 G/DL (ref 3.4–5)
ALBUMIN/GLOB SERPL: 1.1 {RATIO} (ref 1–2)
ALP LIVER SERPL-CCNC: 101 U/L
ALT SERPL-CCNC: 26 U/L
ANION GAP SERPL CALC-SCNC: 1 MMOL/L (ref 0–18)
AST SERPL-CCNC: 29 U/L (ref 15–37)
BASOPHILS # BLD AUTO: 0.02 X10(3) UL (ref 0–0.2)
BASOPHILS NFR BLD AUTO: 0.3 %
BILIRUB SERPL-MCNC: 0.5 MG/DL (ref 0.1–2)
BUN BLD-MCNC: 24 MG/DL (ref 7–18)
BUN/CREAT SERPL: 30.8 (ref 10–20)
CALCIUM BLD-MCNC: 9.3 MG/DL (ref 8.5–10.1)
CHLORIDE SERPL-SCNC: 109 MMOL/L (ref 98–112)
CHOLEST SERPL-MCNC: 214 MG/DL (ref ?–200)
CO2 SERPL-SCNC: 32 MMOL/L (ref 21–32)
CREAT BLD-MCNC: 0.78 MG/DL
DEPRECATED RDW RBC AUTO: 43.7 FL (ref 35.1–46.3)
EOSINOPHIL # BLD AUTO: 0.05 X10(3) UL (ref 0–0.7)
EOSINOPHIL NFR BLD AUTO: 0.7 %
ERYTHROCYTE [DISTWIDTH] IN BLOOD BY AUTOMATED COUNT: 12.8 % (ref 11–15)
FASTING PATIENT LIPID ANSWER: YES
FASTING STATUS PATIENT QL REPORTED: YES
GLOBULIN PLAS-MCNC: 3.5 G/DL (ref 2.8–4.4)
GLUCOSE BLD-MCNC: 93 MG/DL (ref 70–99)
HCT VFR BLD AUTO: 40.1 %
HDLC SERPL-MCNC: 70 MG/DL (ref 40–59)
HGB BLD-MCNC: 12.4 G/DL
IMM GRANULOCYTES # BLD AUTO: 0.02 X10(3) UL (ref 0–1)
IMM GRANULOCYTES NFR BLD: 0.3 %
LDLC SERPL CALC-MCNC: 128 MG/DL (ref ?–100)
LYMPHOCYTES # BLD AUTO: 2.31 X10(3) UL (ref 1–4)
LYMPHOCYTES NFR BLD AUTO: 32.6 %
MCH RBC QN AUTO: 28.5 PG (ref 26–34)
MCHC RBC AUTO-ENTMCNC: 30.9 G/DL (ref 31–37)
MCV RBC AUTO: 92.2 FL
MONOCYTES # BLD AUTO: 0.76 X10(3) UL (ref 0.1–1)
MONOCYTES NFR BLD AUTO: 10.7 %
NEUTROPHILS # BLD AUTO: 3.93 X10 (3) UL (ref 1.5–7.7)
NEUTROPHILS # BLD AUTO: 3.93 X10(3) UL (ref 1.5–7.7)
NEUTROPHILS NFR BLD AUTO: 55.4 %
NONHDLC SERPL-MCNC: 144 MG/DL (ref ?–130)
OSMOLALITY SERPL CALC.SUM OF ELEC: 298 MOSM/KG (ref 275–295)
PLATELET # BLD AUTO: 204 10(3)UL (ref 150–450)
POTASSIUM SERPL-SCNC: 4.8 MMOL/L (ref 3.5–5.1)
PROT SERPL-MCNC: 7.5 G/DL (ref 6.4–8.2)
RBC # BLD AUTO: 4.35 X10(6)UL
SODIUM SERPL-SCNC: 142 MMOL/L (ref 136–145)
TRIGL SERPL-MCNC: 93 MG/DL (ref 30–149)
VLDLC SERPL CALC-MCNC: 17 MG/DL (ref 0–30)
WBC # BLD AUTO: 7.1 X10(3) UL (ref 4–11)

## 2022-04-14 PROCEDURE — 80061 LIPID PANEL: CPT

## 2022-04-14 PROCEDURE — 80053 COMPREHEN METABOLIC PANEL: CPT

## 2022-04-14 PROCEDURE — 85025 COMPLETE CBC W/AUTO DIFF WBC: CPT

## 2022-04-14 PROCEDURE — 83521 IG LIGHT CHAINS FREE EACH: CPT

## 2022-04-14 PROCEDURE — 86334 IMMUNOFIX E-PHORESIS SERUM: CPT

## 2022-04-14 PROCEDURE — 36415 COLL VENOUS BLD VENIPUNCTURE: CPT

## 2022-04-14 PROCEDURE — 84165 PROTEIN E-PHORESIS SERUM: CPT

## 2022-04-15 ENCOUNTER — OFFICE VISIT (OUTPATIENT)
Dept: HEMATOLOGY/ONCOLOGY | Facility: HOSPITAL | Age: 82
End: 2022-04-15
Attending: INTERNAL MEDICINE
Payer: MEDICARE

## 2022-04-15 VITALS
SYSTOLIC BLOOD PRESSURE: 133 MMHG | HEIGHT: 63 IN | HEART RATE: 68 BPM | DIASTOLIC BLOOD PRESSURE: 58 MMHG | RESPIRATION RATE: 16 BRPM | BODY MASS INDEX: 21.44 KG/M2 | OXYGEN SATURATION: 99 % | WEIGHT: 121 LBS | TEMPERATURE: 99 F

## 2022-04-15 DIAGNOSIS — C50.911 MALIGNANT NEOPLASM OF RIGHT BREAST IN FEMALE, ESTROGEN RECEPTOR POSITIVE, UNSPECIFIED SITE OF BREAST (HCC): Primary | ICD-10-CM

## 2022-04-15 DIAGNOSIS — D47.2 MONOCLONAL GAMMOPATHY: ICD-10-CM

## 2022-04-15 DIAGNOSIS — Z17.0 MALIGNANT NEOPLASM OF RIGHT BREAST IN FEMALE, ESTROGEN RECEPTOR POSITIVE, UNSPECIFIED SITE OF BREAST (HCC): Primary | ICD-10-CM

## 2022-04-15 DIAGNOSIS — Z79.899 ENCOUNTER FOR MONITORING ADJUVANT HORMONAL THERAPY: ICD-10-CM

## 2022-04-15 DIAGNOSIS — D64.9 ANEMIA, UNSPECIFIED TYPE: ICD-10-CM

## 2022-04-15 DIAGNOSIS — Z51.81 ENCOUNTER FOR MONITORING ADJUVANT HORMONAL THERAPY: ICD-10-CM

## 2022-04-15 PROCEDURE — 99211 OFF/OP EST MAY X REQ PHY/QHP: CPT

## 2022-04-19 LAB
ALBUMIN SERPL ELPH-MCNC: 4.45 G/DL (ref 3.75–5.21)
ALBUMIN/GLOB SERPL: 1.56 {RATIO} (ref 1–2)
ALPHA1 GLOB SERPL ELPH-MCNC: 0.31 G/DL (ref 0.19–0.46)
ALPHA2 GLOB SERPL ELPH-MCNC: 0.85 G/DL (ref 0.48–1.05)
B-GLOBULIN SERPL ELPH-MCNC: 0.75 G/DL (ref 0.68–1.23)
GAMMA GLOB SERPL ELPH-MCNC: 0.94 G/DL (ref 0.62–1.7)
KAPPA LC FREE SER-MCNC: 3.08 MG/DL (ref 0.33–1.94)
KAPPA LC FREE/LAMBDA FREE SER NEPH: 2.86 {RATIO} (ref 0.26–1.65)
LAMBDA LC FREE SERPL-MCNC: 1.08 MG/DL (ref 0.57–2.63)
M PROTEIN 1 SERPL ELPH-MCNC: 0.2 G/DL (ref ?–0)
PROT SERPL-MCNC: 7.3 G/DL (ref 6.4–8.2)

## 2022-07-11 RX ORDER — CLONAZEPAM 0.5 MG/1
TABLET ORAL
Qty: 270 TABLET | Refills: 0 | Status: SHIPPED | OUTPATIENT
Start: 2022-07-11

## 2022-09-06 RX ORDER — LETROZOLE 2.5 MG/1
TABLET, FILM COATED ORAL
Qty: 90 TABLET | Refills: 1 | Status: SHIPPED | OUTPATIENT
Start: 2022-09-06 | End: 2023-04-10

## 2022-09-08 ENCOUNTER — TELEPHONE (OUTPATIENT)
Dept: INTERNAL MEDICINE CLINIC | Facility: CLINIC | Age: 82
End: 2022-09-08

## 2022-09-08 NOTE — TELEPHONE ENCOUNTER
Patient would like to know if you would like her to complete any labs prior to upcoming follow up visit?      Future Appointments   Date Time Provider Leola Angeles   9/19/2022 11:00 AM Keiry Linda MD [facility]   10/13/2022 10:40 AM [facility] EM [facility]   10/17/2022 11:30 AM Shana Ga MD [address] [place] [address] HEM ONC EMO   10/19/2022 10:00 AM Peace Back MD [facility] Surg EL

## 2022-09-19 ENCOUNTER — TELEPHONE (OUTPATIENT)
Dept: INTERNAL MEDICINE CLINIC | Facility: CLINIC | Age: 82
End: 2022-09-19

## 2022-09-19 ENCOUNTER — OFFICE VISIT (OUTPATIENT)
Dept: INTERNAL MEDICINE CLINIC | Facility: CLINIC | Age: 82
End: 2022-09-19
Payer: MEDICARE

## 2022-09-19 ENCOUNTER — EKG ENCOUNTER (OUTPATIENT)
Dept: LAB | Age: 82
End: 2022-09-19
Attending: INTERNAL MEDICINE

## 2022-09-19 VITALS
HEART RATE: 73 BPM | WEIGHT: 122.19 LBS | DIASTOLIC BLOOD PRESSURE: 70 MMHG | BODY MASS INDEX: 21.65 KG/M2 | SYSTOLIC BLOOD PRESSURE: 133 MMHG | HEIGHT: 63 IN | RESPIRATION RATE: 16 BRPM

## 2022-09-19 DIAGNOSIS — Z17.0 MALIGNANT NEOPLASM OF RIGHT BREAST IN FEMALE, ESTROGEN RECEPTOR POSITIVE, UNSPECIFIED SITE OF BREAST (HCC): ICD-10-CM

## 2022-09-19 DIAGNOSIS — R06.09 DOE (DYSPNEA ON EXERTION): ICD-10-CM

## 2022-09-19 DIAGNOSIS — M41.86 SCOLIOSIS OF LUMBAR REGION DUE TO DEGENERATIVE DISEASE OF SPINE IN ADULT: ICD-10-CM

## 2022-09-19 DIAGNOSIS — C50.911 MALIGNANT NEOPLASM OF RIGHT BREAST IN FEMALE, ESTROGEN RECEPTOR POSITIVE, UNSPECIFIED SITE OF BREAST (HCC): ICD-10-CM

## 2022-09-19 DIAGNOSIS — E78.5 HYPERLIPIDEMIA LDL GOAL <130: ICD-10-CM

## 2022-09-19 DIAGNOSIS — M51.26 LUMBAR DISC HERNIATION: Primary | ICD-10-CM

## 2022-09-19 PROCEDURE — 1125F AMNT PAIN NOTED PAIN PRSNT: CPT | Performed by: INTERNAL MEDICINE

## 2022-09-19 PROCEDURE — 99214 OFFICE O/P EST MOD 30 MIN: CPT | Performed by: INTERNAL MEDICINE

## 2022-09-19 PROCEDURE — 93005 ELECTROCARDIOGRAM TRACING: CPT

## 2022-09-19 PROCEDURE — 3008F BODY MASS INDEX DOCD: CPT | Performed by: INTERNAL MEDICINE

## 2022-09-19 PROCEDURE — 3078F DIAST BP <80 MM HG: CPT | Performed by: INTERNAL MEDICINE

## 2022-09-19 PROCEDURE — 3075F SYST BP GE 130 - 139MM HG: CPT | Performed by: INTERNAL MEDICINE

## 2022-09-19 PROCEDURE — 93010 ELECTROCARDIOGRAM REPORT: CPT | Performed by: INTERNAL MEDICINE

## 2022-09-19 NOTE — TELEPHONE ENCOUNTER
Patient called, verified Name and . She is inquiring about her EKG test that resulted in her MyChart. She is concerned and wanted to know what it means since it says Abnormal. Relayed that Dr. Twila Bernheim has not had the chance to review it yet and that we will notify her once she did so. Also confirmed her referral with Physiatry. Also asked her to just give us a call with any questions or concerns she has regarding MyChart since she has trouble navigating thru it. Patient verbalized understanding and had no further questions at this time.

## 2022-10-05 ENCOUNTER — NURSE TRIAGE (OUTPATIENT)
Dept: INTERNAL MEDICINE CLINIC | Facility: CLINIC | Age: 82
End: 2022-10-05

## 2022-10-05 NOTE — TELEPHONE ENCOUNTER
Verified name and .     Appointment scheduled:      Future Appointments   Date Time Provider Leola Angeles   10/6/2022 10:40 AM Ish Allen PA-C [facility]   10/13/2022 10:40 AM [address]   10/17/2022 11:30 AM William Ga MD [facility] HEM ONC EMO   10/19/2022 10:00 AM Sada Ruvalcaba MD [facility] S[facility] EL

## 2022-10-13 ENCOUNTER — HOSPITAL ENCOUNTER (OUTPATIENT)
Dept: ULTRASOUND IMAGING | Facility: HOSPITAL | Age: 82
Discharge: HOME | End: 2022-10-13
Attending: SURGERY
Payer: MEDICARE

## 2022-10-13 ENCOUNTER — HOSPITAL ENCOUNTER (OUTPATIENT)
Dept: MAMMOGRAPHY | Facility: HOSPITAL | Age: 82
Discharge: HOME | End: 2022-10-13
Attending: SURGERY
Payer: MEDICARE

## 2022-10-13 DIAGNOSIS — C50.411 MALIGNANT NEOPLASM OF UPPER-OUTER QUADRANT OF RIGHT BREAST IN FEMALE, ESTROGEN RECEPTOR POSITIVE (HCC): ICD-10-CM

## 2022-10-13 DIAGNOSIS — Z17.0 MALIGNANT NEOPLASM OF UPPER-OUTER QUADRANT OF RIGHT BREAST IN FEMALE, ESTROGEN RECEPTOR POSITIVE (HCC): ICD-10-CM

## 2022-10-13 PROCEDURE — 77062 BREAST TOMOSYNTHESIS BI: CPT | Performed by: SURGERY

## 2022-10-13 PROCEDURE — 76642 ULTRASOUND BREAST LIMITED: CPT | Performed by: SURGERY

## 2022-10-13 PROCEDURE — 77066 DX MAMMO INCL CAD BI: CPT | Performed by: SURGERY

## 2022-10-14 ENCOUNTER — LAB ENCOUNTER (OUTPATIENT)
Dept: LAB | Facility: HOSPITAL | Age: 82
End: 2022-10-14
Attending: INTERNAL MEDICINE
Payer: MEDICARE

## 2022-10-14 DIAGNOSIS — Z51.81 ENCOUNTER FOR MONITORING ADJUVANT HORMONAL THERAPY: ICD-10-CM

## 2022-10-14 DIAGNOSIS — D64.9 ANEMIA, UNSPECIFIED TYPE: ICD-10-CM

## 2022-10-14 DIAGNOSIS — Z79.899 ENCOUNTER FOR MONITORING ADJUVANT HORMONAL THERAPY: ICD-10-CM

## 2022-10-14 DIAGNOSIS — D47.2 MONOCLONAL GAMMOPATHY: ICD-10-CM

## 2022-10-14 DIAGNOSIS — Z17.0 MALIGNANT NEOPLASM OF RIGHT BREAST IN FEMALE, ESTROGEN RECEPTOR POSITIVE, UNSPECIFIED SITE OF BREAST (HCC): ICD-10-CM

## 2022-10-14 DIAGNOSIS — C50.911 MALIGNANT NEOPLASM OF RIGHT BREAST IN FEMALE, ESTROGEN RECEPTOR POSITIVE, UNSPECIFIED SITE OF BREAST (HCC): ICD-10-CM

## 2022-10-14 LAB
ALBUMIN SERPL-MCNC: 4.3 G/DL (ref 3.4–5)
ALBUMIN/GLOB SERPL: 1.3 {RATIO} (ref 1–2)
ALP LIVER SERPL-CCNC: 99 U/L
ALT SERPL-CCNC: 25 U/L
ANION GAP SERPL CALC-SCNC: 5 MMOL/L (ref 0–18)
AST SERPL-CCNC: 29 U/L (ref 15–37)
BASOPHILS # BLD AUTO: 0.03 X10(3) UL (ref 0–0.2)
BASOPHILS NFR BLD AUTO: 0.4 %
BILIRUB SERPL-MCNC: 0.4 MG/DL (ref 0.1–2)
BUN BLD-MCNC: 21 MG/DL (ref 7–18)
BUN/CREAT SERPL: 27.6 (ref 10–20)
CALCIUM BLD-MCNC: 10 MG/DL (ref 8.5–10.1)
CHLORIDE SERPL-SCNC: 106 MMOL/L (ref 98–112)
CO2 SERPL-SCNC: 27 MMOL/L (ref 21–32)
CREAT BLD-MCNC: 0.76 MG/DL
DEPRECATED RDW RBC AUTO: 41.3 FL (ref 35.1–46.3)
EOSINOPHIL # BLD AUTO: 0.05 X10(3) UL (ref 0–0.7)
EOSINOPHIL NFR BLD AUTO: 0.7 %
ERYTHROCYTE [DISTWIDTH] IN BLOOD BY AUTOMATED COUNT: 12.4 % (ref 11–15)
FASTING STATUS PATIENT QL REPORTED: YES
GFR SERPLBLD BASED ON 1.73 SQ M-ARVRAT: 77 ML/MIN/1.73M2 (ref 60–?)
GLOBULIN PLAS-MCNC: 3.3 G/DL (ref 2.8–4.4)
GLUCOSE BLD-MCNC: 95 MG/DL (ref 70–99)
HCT VFR BLD AUTO: 36 %
HGB BLD-MCNC: 11.6 G/DL
IMM GRANULOCYTES # BLD AUTO: 0.03 X10(3) UL (ref 0–1)
IMM GRANULOCYTES NFR BLD: 0.4 %
LYMPHOCYTES # BLD AUTO: 2.3 X10(3) UL (ref 1–4)
LYMPHOCYTES NFR BLD AUTO: 33.6 %
MCH RBC QN AUTO: 29 PG (ref 26–34)
MCHC RBC AUTO-ENTMCNC: 32.2 G/DL (ref 31–37)
MCV RBC AUTO: 90 FL
MONOCYTES # BLD AUTO: 0.92 X10(3) UL (ref 0.1–1)
MONOCYTES NFR BLD AUTO: 13.4 %
NEUTROPHILS # BLD AUTO: 3.52 X10 (3) UL (ref 1.5–7.7)
NEUTROPHILS # BLD AUTO: 3.52 X10(3) UL (ref 1.5–7.7)
NEUTROPHILS NFR BLD AUTO: 51.5 %
OSMOLALITY SERPL CALC.SUM OF ELEC: 289 MOSM/KG (ref 275–295)
PLATELET # BLD AUTO: 192 10(3)UL (ref 150–450)
POTASSIUM SERPL-SCNC: 4.6 MMOL/L (ref 3.5–5.1)
PROT SERPL-MCNC: 7.6 G/DL (ref 6.4–8.2)
RBC # BLD AUTO: 4 X10(6)UL
SODIUM SERPL-SCNC: 138 MMOL/L (ref 136–145)
WBC # BLD AUTO: 6.9 X10(3) UL (ref 4–11)

## 2022-10-14 PROCEDURE — 84165 PROTEIN E-PHORESIS SERUM: CPT

## 2022-10-14 PROCEDURE — 36415 COLL VENOUS BLD VENIPUNCTURE: CPT

## 2022-10-14 PROCEDURE — 85025 COMPLETE CBC W/AUTO DIFF WBC: CPT

## 2022-10-14 PROCEDURE — 83521 IG LIGHT CHAINS FREE EACH: CPT

## 2022-10-14 PROCEDURE — 80053 COMPREHEN METABOLIC PANEL: CPT

## 2022-10-14 PROCEDURE — 86334 IMMUNOFIX E-PHORESIS SERUM: CPT

## 2022-10-17 ENCOUNTER — OFFICE VISIT (OUTPATIENT)
Dept: HEMATOLOGY/ONCOLOGY | Facility: HOSPITAL | Age: 82
End: 2022-10-17
Attending: INTERNAL MEDICINE
Payer: MEDICARE

## 2022-10-17 VITALS
SYSTOLIC BLOOD PRESSURE: 143 MMHG | BODY MASS INDEX: 21.76 KG/M2 | TEMPERATURE: 99 F | RESPIRATION RATE: 16 BRPM | WEIGHT: 122.81 LBS | HEIGHT: 63 IN | HEART RATE: 61 BPM | OXYGEN SATURATION: 100 % | DIASTOLIC BLOOD PRESSURE: 61 MMHG

## 2022-10-17 DIAGNOSIS — Z79.899 ENCOUNTER FOR MONITORING ADJUVANT HORMONAL THERAPY: ICD-10-CM

## 2022-10-17 DIAGNOSIS — D47.2 MONOCLONAL GAMMOPATHY: ICD-10-CM

## 2022-10-17 DIAGNOSIS — Z17.0 MALIGNANT NEOPLASM OF RIGHT BREAST IN FEMALE, ESTROGEN RECEPTOR POSITIVE, UNSPECIFIED SITE OF BREAST (HCC): Primary | ICD-10-CM

## 2022-10-17 DIAGNOSIS — Z51.81 ENCOUNTER FOR MONITORING ADJUVANT HORMONAL THERAPY: ICD-10-CM

## 2022-10-17 DIAGNOSIS — D64.9 ANEMIA, UNSPECIFIED TYPE: ICD-10-CM

## 2022-10-17 DIAGNOSIS — C50.911 MALIGNANT NEOPLASM OF RIGHT BREAST IN FEMALE, ESTROGEN RECEPTOR POSITIVE, UNSPECIFIED SITE OF BREAST (HCC): Primary | ICD-10-CM

## 2022-10-17 PROCEDURE — 99214 OFFICE O/P EST MOD 30 MIN: CPT | Performed by: INTERNAL MEDICINE

## 2022-10-18 LAB
ALBUMIN SERPL ELPH-MCNC: 4.3 G/DL (ref 3.75–5.21)
ALBUMIN/GLOB SERPL: 1.48 {RATIO} (ref 1–2)
ALPHA1 GLOB SERPL ELPH-MCNC: 0.34 G/DL (ref 0.19–0.46)
ALPHA2 GLOB SERPL ELPH-MCNC: 0.93 G/DL (ref 0.48–1.05)
B-GLOBULIN SERPL ELPH-MCNC: 0.76 G/DL (ref 0.68–1.23)
GAMMA GLOB SERPL ELPH-MCNC: 0.88 G/DL (ref 0.62–1.7)
KAPPA LC FREE SER-MCNC: 3.73 MG/DL (ref 0.33–1.94)
KAPPA LC FREE/LAMBDA FREE SER NEPH: 2.66 {RATIO} (ref 0.26–1.65)
LAMBDA LC FREE SERPL-MCNC: 1.4 MG/DL (ref 0.57–2.63)
M PROTEIN 1 SERPL ELPH-MCNC: 0.19 G/DL (ref ?–0)
PROT SERPL-MCNC: 7.2 G/DL (ref 6.4–8.2)

## 2022-10-19 ENCOUNTER — OFFICE VISIT (OUTPATIENT)
Dept: SURGERY | Facility: CLINIC | Age: 82
End: 2022-10-19
Payer: MEDICARE

## 2022-10-19 VITALS
BODY MASS INDEX: 21.62 KG/M2 | HEART RATE: 65 BPM | SYSTOLIC BLOOD PRESSURE: 141 MMHG | HEIGHT: 63 IN | RESPIRATION RATE: 16 BRPM | DIASTOLIC BLOOD PRESSURE: 65 MMHG | WEIGHT: 122 LBS

## 2022-10-19 DIAGNOSIS — Z17.0 MALIGNANT NEOPLASM OF UPPER-OUTER QUADRANT OF RIGHT BREAST IN FEMALE, ESTROGEN RECEPTOR POSITIVE (HCC): Primary | ICD-10-CM

## 2022-10-19 DIAGNOSIS — C50.411 MALIGNANT NEOPLASM OF UPPER-OUTER QUADRANT OF RIGHT BREAST IN FEMALE, ESTROGEN RECEPTOR POSITIVE (HCC): Primary | ICD-10-CM

## 2022-10-19 PROCEDURE — 99213 OFFICE O/P EST LOW 20 MIN: CPT | Performed by: SURGERY

## 2022-10-19 PROCEDURE — 3078F DIAST BP <80 MM HG: CPT | Performed by: SURGERY

## 2022-10-19 PROCEDURE — 3008F BODY MASS INDEX DOCD: CPT | Performed by: SURGERY

## 2022-10-19 PROCEDURE — 3077F SYST BP >= 140 MM HG: CPT | Performed by: SURGERY

## 2022-11-12 RX ORDER — CLONAZEPAM 0.5 MG/1
TABLET ORAL
Qty: 270 TABLET | Refills: 0 | Status: SHIPPED | OUTPATIENT
Start: 2022-11-12

## 2022-11-20 ENCOUNTER — HOSPITAL ENCOUNTER (EMERGENCY)
Facility: HOSPITAL | Age: 82
Discharge: HOME | End: 2022-11-20
Attending: EMERGENCY MEDICINE
Payer: MEDICARE

## 2022-11-20 VITALS
WEIGHT: 120 LBS | DIASTOLIC BLOOD PRESSURE: 71 MMHG | HEART RATE: 81 BPM | HEIGHT: 63 IN | OXYGEN SATURATION: 99 % | TEMPERATURE: 98 F | RESPIRATION RATE: 20 BRPM | SYSTOLIC BLOOD PRESSURE: 121 MMHG | BODY MASS INDEX: 21.26 KG/M2

## 2022-11-20 DIAGNOSIS — S30.0XXA CONTUSION OF COCCYX, INITIAL ENCOUNTER: Primary | ICD-10-CM

## 2022-11-20 PROCEDURE — 99282 EMERGENCY DEPT VISIT SF MDM: CPT

## 2022-11-20 PROCEDURE — 99283 EMERGENCY DEPT VISIT LOW MDM: CPT

## 2022-11-20 NOTE — ED INITIAL ASSESSMENT (HPI)
Patient presents to ER with c/o slip and fall this AM.  Reports falling on her bottom and reports tailbone pain. Denies head injury.

## 2022-12-07 ENCOUNTER — NURSE TRIAGE (OUTPATIENT)
Dept: INTERNAL MEDICINE CLINIC | Facility: CLINIC | Age: 82
End: 2022-12-07

## 2022-12-08 ENCOUNTER — HOSPITAL ENCOUNTER (OUTPATIENT)
Dept: GENERAL RADIOLOGY | Facility: HOSPITAL | Age: 82
Discharge: HOME | End: 2022-12-08
Attending: INTERNAL MEDICINE
Payer: MEDICARE

## 2022-12-08 ENCOUNTER — EKG ENCOUNTER (OUTPATIENT)
Dept: LAB | Age: 82
End: 2022-12-08
Attending: INTERNAL MEDICINE
Payer: MEDICARE

## 2022-12-08 DIAGNOSIS — S30.0XXD CONTUSION OF COCCYX, SUBSEQUENT ENCOUNTER: ICD-10-CM

## 2022-12-08 DIAGNOSIS — R06.09 DOE (DYSPNEA ON EXERTION): ICD-10-CM

## 2022-12-08 LAB
ATRIAL RATE: 71 BPM
P AXIS: 67 DEGREES
P-R INTERVAL: 180 MS
Q-T INTERVAL: 390 MS
QRS DURATION: 70 MS
QTC CALCULATION (BEZET): 423 MS
R AXIS: 25 DEGREES
T AXIS: 58 DEGREES
VENTRICULAR RATE: 71 BPM

## 2022-12-08 PROCEDURE — 93010 ELECTROCARDIOGRAM REPORT: CPT | Performed by: INTERNAL MEDICINE

## 2022-12-08 PROCEDURE — 93005 ELECTROCARDIOGRAM TRACING: CPT

## 2022-12-08 PROCEDURE — 71046 X-RAY EXAM CHEST 2 VIEWS: CPT | Performed by: INTERNAL MEDICINE

## 2022-12-08 PROCEDURE — 72220 X-RAY EXAM SACRUM TAILBONE: CPT | Performed by: INTERNAL MEDICINE

## 2022-12-09 ENCOUNTER — TELEPHONE (OUTPATIENT)
Dept: INTERNAL MEDICINE CLINIC | Facility: CLINIC | Age: 82
End: 2022-12-09

## 2022-12-09 NOTE — TELEPHONE ENCOUNTER
Called patient for XR results    Patient wanted to let patient know that she stopped taking the Lexapro due to medication keeping her up at night with nausea and vomiting. Patient state that she is not depressed and is sad about her son passing. Patient is asking a mild sedative to help her sleep and relax.  Please advise

## 2022-12-09 NOTE — PROGRESS NOTES
Results reviewed.   Please inform patient chest x-ray is normal.  She is requesting a phone call if she does not go through the [address]

## 2022-12-09 NOTE — PROGRESS NOTES
Results reviewed. Please inform patient that she does not have a fracture.   She is requesting a call as she does not go through her MyChart

## 2022-12-09 NOTE — PROGRESS NOTES
Please call patient and let them know that the EKG is without any acute changes although there are some nonspecific changes.   It may still be a good idea to follow-up with the cardiologist next year as discussed at the visit

## 2022-12-10 NOTE — TELEPHONE ENCOUNTER
She can take 2 clonazepam's twice a day and see how she does with that since this is a medication that she is familiar with and is working  Will take Lexapro off of the med list

## 2022-12-10 NOTE — TELEPHONE ENCOUNTER
No answer on cell # unable to leave ms. Called home #. Spoke to patient. Patient informed of Dr Pollie Hodgkins advise. Patient states Last night she took 3 pills at night, and slept all night \"it was wonderful\". Woke up about 9am.     She will try provider's advice : take clonazepam 2 tabs po twice a day.

## 2023-02-14 ENCOUNTER — TELEPHONE (OUTPATIENT)
Dept: INTERNAL MEDICINE CLINIC | Facility: CLINIC | Age: 83
End: 2023-02-14

## 2023-02-14 NOTE — TELEPHONE ENCOUNTER
Patient will need to provide information on who they are going to see or the PCP will need to refer the patient. [facility] can not direct patient care.

## 2023-02-14 NOTE — TELEPHONE ENCOUNTER
Per patient she would like a referral where she can go for an Ophthalmology for Cataract surgery. .  Please advise

## 2023-02-15 NOTE — TELEPHONE ENCOUNTER
Called patient and relayed below message. Patient states that she already found another Ophthalmologist that is able to see her in March. States [facility] could not see her until May.

## 2023-02-15 NOTE — TELEPHONE ENCOUNTER
Verified name and . Patient requesting referral for ophthalmologist for cataracts- she states she was advised that she needs to have cataract surgery. She is asking that Dr. Torri Dozier give her a recommendation for a provider. Patient asks that she be called with response and if she does not  the phone to please leave a detailed message on her home phone number.

## 2023-02-19 DIAGNOSIS — F32.9 REACTIVE DEPRESSION: ICD-10-CM

## 2023-02-20 ENCOUNTER — TELEPHONE (OUTPATIENT)
Dept: HEMATOLOGY/ONCOLOGY | Facility: HOSPITAL | Age: 83
End: 2023-02-20

## 2023-02-20 NOTE — TELEPHONE ENCOUNTER
Called patient per Sruthi Alfaro she is to follow up with Derm regarding Mole. She verbalizes understanding.

## 2023-02-20 NOTE — TELEPHONE ENCOUNTER
Patient discovered a mole  below right breast. Is concerned. Has appt 04/19/23. Is asking if she should come in sooner. Please call to advise?  Please call after 4:30pm.

## 2023-02-21 RX ORDER — CLONAZEPAM 0.5 MG/1
TABLET ORAL
Qty: 270 TABLET | Refills: 0 | Status: SHIPPED | OUTPATIENT
Start: 2023-02-21

## 2023-02-22 NOTE — TELEPHONE ENCOUNTER
Pt to try to wean back to one tab in the am and 2 at night --if she is still needing the 1mg bid as k her notto  this script and a new one can be sent but our hope and goal is to go down to what she was on before --pls ask pt

## 2023-03-06 ENCOUNTER — TELEPHONE (OUTPATIENT)
Dept: INTERNAL MEDICINE CLINIC | Facility: CLINIC | Age: 83
End: 2023-03-06

## 2023-03-06 NOTE — TELEPHONE ENCOUNTER
Condition update:  Patient wants to inform Dr David Philip having cataract surgery 3/29/2023. No anesthesia to be used, does not need pre op physical.  She was directed by eye provider to ask PCP if OK to take her usual clonzepam 0.5 mg, two tabs at bedtime? States will be given Percocet and was asked to confirm if this is ok with PCP? Dr Alexander Galvez, please advise? RN returning arlyn--OK to leave message on voicemail.

## 2023-03-07 NOTE — TELEPHONE ENCOUNTER
Patient returned call. Could not understand message that was left for her. She has never taken percocet before. Only takes 2 0.5mg clonazepam at night. Believes her procedure will be the next day in the afternoon. She has increased stress with her grandson. Inquires if ok to continue with med or if dose should be decreased the night before surgery. See also note in grandson's chart. Separate encounter sent.

## 2023-03-07 NOTE — TELEPHONE ENCOUNTER
Has she ever taken suck a strong pain killer like percocet ?    the clonazepam was recently increased due to stress , when she feels comfortable she should try weaning her self offf

## 2023-03-08 NOTE — TELEPHONE ENCOUNTER
Spoke with pt and MD message below given.   Pt verb understanding    Pt asking for OV to discuss anxiety and OV scheduled 3/11/23

## 2023-03-11 PROBLEM — F51.01 PRIMARY INSOMNIA: Status: ACTIVE | Noted: 2023-03-11

## 2023-03-21 DIAGNOSIS — F51.01 PRIMARY INSOMNIA: ICD-10-CM

## 2023-03-21 RX ORDER — ZOLPIDEM TARTRATE 5 MG/1
TABLET ORAL
Qty: 10 TABLET | Refills: 0 | Status: SHIPPED | OUTPATIENT
Start: 2023-03-21

## 2023-03-31 NOTE — TELEPHONE ENCOUNTER
DR Alexander Galvez: patient seeking larger qty. Patient states zolpidem is helping. She would like more than qty of 10.  (last rx 3/21/23 #10) she has 3 tablets left. Pended rx for #90 if you approve, or you can change to #30.

## 2023-04-01 RX ORDER — ZOLPIDEM TARTRATE 5 MG/1
5 TABLET ORAL NIGHTLY PRN
Qty: 30 TABLET | Refills: 0 | Status: SHIPPED | OUTPATIENT
Start: 2023-04-01

## 2023-04-17 ENCOUNTER — LAB ENCOUNTER (OUTPATIENT)
Dept: LAB | Facility: HOSPITAL | Age: 83
End: 2023-04-17
Attending: INTERNAL MEDICINE
Payer: MEDICARE

## 2023-04-17 DIAGNOSIS — D47.2 MONOCLONAL GAMMOPATHY: ICD-10-CM

## 2023-04-17 LAB
ALBUMIN SERPL-MCNC: 3.8 G/DL (ref 3.4–5)
ALBUMIN/GLOB SERPL: 1 {RATIO} (ref 1–2)
ALP LIVER SERPL-CCNC: 92 U/L
ALT SERPL-CCNC: 23 U/L
ANION GAP SERPL CALC-SCNC: 2 MMOL/L (ref 0–18)
AST SERPL-CCNC: 30 U/L (ref 15–37)
BASOPHILS # BLD AUTO: 0.02 X10(3) UL (ref 0–0.2)
BASOPHILS NFR BLD AUTO: 0.3 %
BILIRUB SERPL-MCNC: 0.4 MG/DL (ref 0.1–2)
BUN BLD-MCNC: 20 MG/DL (ref 7–18)
BUN/CREAT SERPL: 27.8 (ref 10–20)
CALCIUM BLD-MCNC: 9.7 MG/DL (ref 8.5–10.1)
CHLORIDE SERPL-SCNC: 108 MMOL/L (ref 98–112)
CO2 SERPL-SCNC: 29 MMOL/L (ref 21–32)
CREAT BLD-MCNC: 0.72 MG/DL
DEPRECATED RDW RBC AUTO: 42.3 FL (ref 35.1–46.3)
EOSINOPHIL # BLD AUTO: 0.06 X10(3) UL (ref 0–0.7)
EOSINOPHIL NFR BLD AUTO: 0.8 %
ERYTHROCYTE [DISTWIDTH] IN BLOOD BY AUTOMATED COUNT: 12.9 % (ref 11–15)
FASTING STATUS PATIENT QL REPORTED: YES
GFR SERPLBLD BASED ON 1.73 SQ M-ARVRAT: 82 ML/MIN/1.73M2 (ref 60–?)
GLOBULIN PLAS-MCNC: 3.8 G/DL (ref 2.8–4.4)
GLUCOSE BLD-MCNC: 103 MG/DL (ref 70–99)
HCT VFR BLD AUTO: 37.1 %
HGB BLD-MCNC: 11.8 G/DL
IMM GRANULOCYTES # BLD AUTO: 0.02 X10(3) UL (ref 0–1)
IMM GRANULOCYTES NFR BLD: 0.3 %
LYMPHOCYTES # BLD AUTO: 2 X10(3) UL (ref 1–4)
LYMPHOCYTES NFR BLD AUTO: 26.7 %
MCH RBC QN AUTO: 28.3 PG (ref 26–34)
MCHC RBC AUTO-ENTMCNC: 31.8 G/DL (ref 31–37)
MCV RBC AUTO: 89 FL
MONOCYTES # BLD AUTO: 0.9 X10(3) UL (ref 0.1–1)
MONOCYTES NFR BLD AUTO: 12 %
NEUTROPHILS # BLD AUTO: 4.48 X10 (3) UL (ref 1.5–7.7)
NEUTROPHILS # BLD AUTO: 4.48 X10(3) UL (ref 1.5–7.7)
NEUTROPHILS NFR BLD AUTO: 59.9 %
OSMOLALITY SERPL CALC.SUM OF ELEC: 291 MOSM/KG (ref 275–295)
PLATELET # BLD AUTO: 194 10(3)UL (ref 150–450)
POTASSIUM SERPL-SCNC: 4.4 MMOL/L (ref 3.5–5.1)
PROT SERPL-MCNC: 7.6 G/DL (ref 6.4–8.2)
RBC # BLD AUTO: 4.17 X10(6)UL
SODIUM SERPL-SCNC: 139 MMOL/L (ref 136–145)
WBC # BLD AUTO: 7.5 X10(3) UL (ref 4–11)

## 2023-04-17 PROCEDURE — 36415 COLL VENOUS BLD VENIPUNCTURE: CPT

## 2023-04-17 PROCEDURE — 85025 COMPLETE CBC W/AUTO DIFF WBC: CPT

## 2023-04-17 PROCEDURE — 86334 IMMUNOFIX E-PHORESIS SERUM: CPT

## 2023-04-17 PROCEDURE — 80053 COMPREHEN METABOLIC PANEL: CPT

## 2023-04-17 PROCEDURE — 84165 PROTEIN E-PHORESIS SERUM: CPT

## 2023-04-17 PROCEDURE — 83521 IG LIGHT CHAINS FREE EACH: CPT

## 2023-04-18 LAB
ALBUMIN SERPL ELPH-MCNC: 4.35 G/DL (ref 3.75–5.21)
ALBUMIN/GLOB SERPL: 1.53 {RATIO} (ref 1–2)
ALPHA1 GLOB SERPL ELPH-MCNC: 0.34 G/DL (ref 0.19–0.46)
ALPHA2 GLOB SERPL ELPH-MCNC: 0.91 G/DL (ref 0.48–1.05)
B-GLOBULIN SERPL ELPH-MCNC: 0.76 G/DL (ref 0.68–1.23)
GAMMA GLOB SERPL ELPH-MCNC: 0.84 G/DL (ref 0.62–1.7)
KAPPA LC FREE SER-MCNC: 4.15 MG/DL (ref 0.33–1.94)
KAPPA LC FREE/LAMBDA FREE SER NEPH: 3.16 {RATIO} (ref 0.26–1.65)
LAMBDA LC FREE SERPL-MCNC: 1.31 MG/DL (ref 0.57–2.63)
M PROTEIN 1 SERPL ELPH-MCNC: 0.18 G/DL (ref ?–0)
PROT SERPL-MCNC: 7.2 G/DL (ref 6.4–8.2)

## 2023-04-19 ENCOUNTER — OFFICE VISIT (OUTPATIENT)
Dept: HEMATOLOGY/ONCOLOGY | Facility: HOSPITAL | Age: 83
End: 2023-04-19
Attending: INTERNAL MEDICINE
Payer: MEDICARE

## 2023-04-19 VITALS
WEIGHT: 120.38 LBS | BODY MASS INDEX: 21.33 KG/M2 | SYSTOLIC BLOOD PRESSURE: 118 MMHG | OXYGEN SATURATION: 99 % | RESPIRATION RATE: 16 BRPM | HEIGHT: 63 IN | HEART RATE: 81 BPM | DIASTOLIC BLOOD PRESSURE: 58 MMHG | TEMPERATURE: 99 F

## 2023-04-19 DIAGNOSIS — Z51.81 ENCOUNTER FOR MONITORING ADJUVANT HORMONAL THERAPY: ICD-10-CM

## 2023-04-19 DIAGNOSIS — D47.2 MONOCLONAL GAMMOPATHY: ICD-10-CM

## 2023-04-19 DIAGNOSIS — D64.9 ANEMIA, UNSPECIFIED TYPE: ICD-10-CM

## 2023-04-19 DIAGNOSIS — Z17.0 MALIGNANT NEOPLASM OF RIGHT BREAST IN FEMALE, ESTROGEN RECEPTOR POSITIVE, UNSPECIFIED SITE OF BREAST (HCC): Primary | ICD-10-CM

## 2023-04-19 DIAGNOSIS — Z79.899 ENCOUNTER FOR MONITORING ADJUVANT HORMONAL THERAPY: ICD-10-CM

## 2023-04-19 DIAGNOSIS — C50.911 MALIGNANT NEOPLASM OF RIGHT BREAST IN FEMALE, ESTROGEN RECEPTOR POSITIVE, UNSPECIFIED SITE OF BREAST (HCC): Primary | ICD-10-CM

## 2023-04-19 PROCEDURE — 99214 OFFICE O/P EST MOD 30 MIN: CPT | Performed by: INTERNAL MEDICINE

## 2023-05-02 RX ORDER — ZOLPIDEM TARTRATE 5 MG/1
5 TABLET ORAL NIGHTLY PRN
Qty: 30 TABLET | Refills: 0 | Status: SHIPPED | OUTPATIENT
Start: 2023-05-02

## 2023-05-02 NOTE — TELEPHONE ENCOUNTER
Please review refill protocol failed/ no protocol  Requested Prescriptions   Pending Prescriptions Disp Refills    ZOLPIDEM 5 MG Oral Tab [Pharmacy Med Name: ZOLPIDEM 5MG TABLETS] 30 tablet 0     Sig: TAKE 1 TABLET(5 MG) BY MOUTH EVERY NIGHT AS NEEDED FOR SLEEP       There is no refill protocol information for this order

## 2023-05-30 RX ORDER — ZOLPIDEM TARTRATE 5 MG/1
TABLET ORAL
Qty: 30 TABLET | Refills: 0 | Status: SHIPPED | OUTPATIENT
Start: 2023-05-30 | End: 2023-06-01

## 2023-05-30 NOTE — TELEPHONE ENCOUNTER
Please review. Protocol failed / No Protocol.     Requested Prescriptions   Pending Prescriptions Disp Refills    ZOLPIDEM 5 MG Oral Tab [Pharmacy Med Name: ZOLPIDEM 5MG TABLETS] 30 tablet 0     Sig: TAKE 1 TABLET(5 MG) BY MOUTH EVERY NIGHT AS NEEDED FOR SLEEP       There is no refill protocol information for this order

## 2023-05-31 NOTE — TELEPHONE ENCOUNTER
Medication Being Authorized     ZOLPIDEM 5 MG Oral Tab    TAKE 1 TABLET(5 MG) BY MOUTH EVERY NIGHT AS NEEDED FOR SLEEP    Dispense: 30 tablet Refills: 0     Start: 5/30/2023      Class: Normal      This order has been released to its destination.    To be filled at: [id number] - [address], [address] [facility]  [address]

## 2023-06-01 ENCOUNTER — MED REC SCAN ONLY (OUTPATIENT)
Dept: INTERNAL MEDICINE CLINIC | Facility: CLINIC | Age: 83
End: 2023-06-01

## 2023-06-01 NOTE — TELEPHONE ENCOUNTER
PA completed yesterday.   Denied  5/31/2023 12:26 PM  Case ID: [id number] Appeal supported: Yes   Payer:  Jevon Drake   [phone]  [facility] [address]

## 2023-06-01 NOTE — TELEPHONE ENCOUNTER
Spoke to pharmacist at [facility], for the entire year only want to cover amount total of 90 pills. Pharmacist states in records patient picked 10 pills on March 11th, 10 pills on March 23rd, 30 pills on April 3rd, and another 30 pills on May 3rd for the equivalent of 80    [facility] can only dispense the quantity of 10 more pills. Medication needs prior authorization. Patient states she only picked up Zolpidem 1 time for 30 tablets. Patient states she never picked up the other prescriptions. When calling patient back, please only call home phone.

## 2023-06-02 RX ORDER — ZOLPIDEM TARTRATE 5 MG/1
5 TABLET ORAL NIGHTLY PRN
Qty: 20 TABLET | Refills: 0 | Status: SHIPPED | OUTPATIENT
Start: 2023-06-02 | End: 2023-06-03

## 2023-06-03 RX ORDER — ZOLPIDEM TARTRATE 5 MG/1
5 TABLET ORAL NIGHTLY PRN
Qty: 10 TABLET | Refills: 0 | Status: SHIPPED | OUTPATIENT
Start: 2023-06-03

## 2023-06-03 NOTE — TELEPHONE ENCOUNTER
Patient called, verified Name and . She states that she followed up with [place] was asked to put in a verification code (which she got 6 different ones in her computer) and tried all of them which did not work. She was asked to get an mar on her phone and she is unsure how to do that and does not want to deal with it. She now wants the prescription be sent back to [facility] and is willing to pay out of pocket if she has to.     Dr. Hernandez [facility] please see pended Rx for review and approval.

## 2023-06-05 NOTE — TELEPHONE ENCOUNTER
Verified name and . Patient states she was unable to use the the Good Rx coupon as she could not figure out how to use the phone application. She was advised that pharmacies are sometimes able to help with Good Rx and other coupons that they may have in their system. She states she will ask the pharmacy when she picks up the prescription if they have any coupon options for her. She also wanted to confirm that Dr. Robinson Can had sent the prescription for Zolpidem to [facility] in [facility]- upon review, prescription was sent and received by pharmacy on 6/3/23- patient informed.

## 2023-07-31 RX ORDER — ZOLPIDEM TARTRATE 5 MG/1
5 TABLET ORAL NIGHTLY PRN
Qty: 30 TABLET | Refills: 0 | Status: SHIPPED | OUTPATIENT
Start: 2023-07-31

## 2023-07-31 NOTE — TELEPHONE ENCOUNTER
Spoke with pt,  verified  Pt req rx ref for gen zolpidem 5 mg, pt has some pill left for tonight. pls advise, thanks in advance.

## 2023-07-31 NOTE — TELEPHONE ENCOUNTER
Failed Protocol/No Protocol. Requested Prescriptions   Pending Prescriptions Disp Refills    ZOLPIDEM 5 MG Oral Tab [Pharmacy Med Name: Zolpidem Tartrate 5 Mg Tab Nort] 30 tablet 0     Sig: Take 1 tablet (5 mg total) by mouth nightly as needed for Sleep.        There is no refill protocol information for this order          Future Appointments         Provider Department Appt Notes    In 2 months [facility] OF [facility] [address] [place] [address]     In 2 months [address] Hematology Oncology 6 M f/u caf    In 2 months MD Nicole Cardenas, [address] [place] [address]          Recent Outpatient Visits              2 months ago Primary insomnia    Ismael Zhang MD    Office Visit    3 months ago Malignant neoplasm of right breast in female, estrogen receptor positive, unspecified site of breast [facility])    [facility] [place] [facility] Hematology Oncology Dennie Back, MD    Office Visit    4 months ago Austin Holter, Skip Both, MD    Office Visit    7 months ago Reactive depression    Ismael Zhang MD    Office Visit    9 months ago Malignant neoplasm of upper-outer quadrant of right breast in female, estrogen receptor positive ([facility] [address]    Alexandra Dao MD    Office Visit

## 2023-08-28 RX ORDER — ZOLPIDEM TARTRATE 5 MG/1
5 TABLET ORAL NIGHTLY PRN
Qty: 30 TABLET | Refills: 0 | Status: SHIPPED | OUTPATIENT
Start: 2023-08-28

## 2023-08-29 NOTE — TELEPHONE ENCOUNTER
Please review refill protocol failed/ no protocol  Requested Prescriptions   Pending Prescriptions Disp Refills    ZOLPIDEM 5 MG Oral Tab [Pharmacy Med Name: Zolpidem Tartrate 5 Mg Tab Nort] 30 tablet 0     Sig: Take 1 tablet (5 mg total) by mouth nightly as needed for Sleep.        There is no refill protocol information for this order

## 2023-08-31 RX ORDER — ZOLPIDEM TARTRATE 5 MG/1
5 TABLET ORAL NIGHTLY PRN
Qty: 30 TABLET | Refills: 0 | OUTPATIENT
Start: 2023-08-31

## 2023-09-28 RX ORDER — ZOLPIDEM TARTRATE 5 MG/1
5 TABLET ORAL NIGHTLY PRN
Qty: 30 TABLET | Refills: 0 | Status: SHIPPED | OUTPATIENT
Start: 2023-09-28

## 2023-09-28 NOTE — TELEPHONE ENCOUNTER
Please review. Protocol failed/ No protocol. Patient called. Would like RX by Saturday. Requested Prescriptions   Pending Prescriptions Disp Refills    ZOLPIDEM 5 MG Oral Tab [Pharmacy Med Name: Zolpidem Tartrate 5 Mg Tab Nort] 30 tablet 0     Sig: Take 1 tablet (5 mg total) by mouth nightly as needed for Sleep. There is no refill protocol information for this order          Recent Outpatient Visits              4 months ago Primary insomnia    [facility], Ric Neal MD    Office Visit    5 months ago Malignant neoplasm of right breast in female, estrogen receptor positive, unspecified site of breast [facility])    [facility] Hematology Oncology George Peterson MD    Office Visit    6 months ago [address] Glyn Rape, MD    Office Visit    9 months ago Reactive depression    Ric Man MD    Office Visit    11 months ago Malignant neoplasm of upper-outer quadrant of right breast in female, estrogen receptor positive ([facility] [address]    [address], [address] [facility], Ej Hilliard MD    Office Visit            Future Appointments         Provider Department Appt Notes    In 3 weeks [facility] [address] for [facility]     In 3 weeks Chan[address] Hematology Oncology 6 M f/u caf    In 3 weeks Nikos Doshi MD [address], [address] [facility], [place]    In 3 months Earline Fuentes MD [address], [address], [place] Establish care.  New PCP request  Policy advised

## 2023-10-19 ENCOUNTER — HOSPITAL ENCOUNTER (OUTPATIENT)
Dept: MAMMOGRAPHY | Facility: HOSPITAL | Age: 83
Discharge: HOME | End: 2023-10-19
Attending: SURGERY
Payer: MEDICARE

## 2023-10-19 DIAGNOSIS — Z17.0 MALIGNANT NEOPLASM OF UPPER-OUTER QUADRANT OF RIGHT BREAST IN FEMALE, ESTROGEN RECEPTOR POSITIVE: ICD-10-CM

## 2023-10-19 DIAGNOSIS — C50.411 MALIGNANT NEOPLASM OF UPPER-OUTER QUADRANT OF RIGHT BREAST IN FEMALE, ESTROGEN RECEPTOR POSITIVE: ICD-10-CM

## 2023-10-19 PROCEDURE — 77062 BREAST TOMOSYNTHESIS BI: CPT | Performed by: SURGERY

## 2023-10-19 PROCEDURE — 77066 DX MAMMO INCL CAD BI: CPT | Performed by: SURGERY

## 2023-10-22 ENCOUNTER — LAB ENCOUNTER (OUTPATIENT)
Dept: LAB | Facility: HOSPITAL | Age: 83
End: 2023-10-22
Attending: INTERNAL MEDICINE

## 2023-10-22 DIAGNOSIS — D47.2 MONOCLONAL GAMMOPATHY: ICD-10-CM

## 2023-10-22 LAB
ALBUMIN SERPL-MCNC: 4 G/DL (ref 3.4–5)
ALBUMIN/GLOB SERPL: 1.1 {RATIO} (ref 1–2)
ALP LIVER SERPL-CCNC: 94 U/L
ALT SERPL-CCNC: 22 U/L
ANION GAP SERPL CALC-SCNC: 6 MMOL/L (ref 0–18)
AST SERPL-CCNC: 26 U/L (ref 15–37)
BASOPHILS # BLD AUTO: 0.02 X10(3) UL (ref 0–0.2)
BASOPHILS NFR BLD AUTO: 0.3 %
BILIRUB SERPL-MCNC: 0.5 MG/DL (ref 0.1–2)
BUN BLD-MCNC: 14 MG/DL (ref 7–18)
BUN/CREAT SERPL: 17.9 (ref 10–20)
CALCIUM BLD-MCNC: 10 MG/DL (ref 8.5–10.1)
CHLORIDE SERPL-SCNC: 109 MMOL/L (ref 98–112)
CO2 SERPL-SCNC: 28 MMOL/L (ref 21–32)
CREAT BLD-MCNC: 0.78 MG/DL
DEPRECATED RDW RBC AUTO: 42.3 FL (ref 35.1–46.3)
EGFRCR SERPLBLD CKD-EPI 2021: 74 ML/MIN/1.73M2 (ref 60–?)
EOSINOPHIL # BLD AUTO: 0.12 X10(3) UL (ref 0–0.7)
EOSINOPHIL NFR BLD AUTO: 1.6 %
ERYTHROCYTE [DISTWIDTH] IN BLOOD BY AUTOMATED COUNT: 12.7 % (ref 11–15)
FASTING STATUS PATIENT QL REPORTED: YES
GLOBULIN PLAS-MCNC: 3.7 G/DL (ref 2.8–4.4)
GLUCOSE BLD-MCNC: 92 MG/DL (ref 70–99)
HCT VFR BLD AUTO: 39.2 %
HGB BLD-MCNC: 12.1 G/DL
IMM GRANULOCYTES # BLD AUTO: 0.02 X10(3) UL (ref 0–1)
IMM GRANULOCYTES NFR BLD: 0.3 %
LYMPHOCYTES # BLD AUTO: 2.49 X10(3) UL (ref 1–4)
LYMPHOCYTES NFR BLD AUTO: 32.7 %
MCH RBC QN AUTO: 27.9 PG (ref 26–34)
MCHC RBC AUTO-ENTMCNC: 30.9 G/DL (ref 31–37)
MCV RBC AUTO: 90.5 FL
MONOCYTES # BLD AUTO: 0.96 X10(3) UL (ref 0.1–1)
MONOCYTES NFR BLD AUTO: 12.6 %
NEUTROPHILS # BLD AUTO: 4 X10 (3) UL (ref 1.5–7.7)
NEUTROPHILS # BLD AUTO: 4 X10(3) UL (ref 1.5–7.7)
NEUTROPHILS NFR BLD AUTO: 52.5 %
OSMOLALITY SERPL CALC.SUM OF ELEC: 296 MOSM/KG (ref 275–295)
PLATELET # BLD AUTO: 209 10(3)UL (ref 150–450)
POTASSIUM SERPL-SCNC: 4.4 MMOL/L (ref 3.5–5.1)
PROT SERPL-MCNC: 7.7 G/DL (ref 6.4–8.2)
RBC # BLD AUTO: 4.33 X10(6)UL
SODIUM SERPL-SCNC: 143 MMOL/L (ref 136–145)
WBC # BLD AUTO: 7.6 X10(3) UL (ref 4–11)

## 2023-10-22 PROCEDURE — 36415 COLL VENOUS BLD VENIPUNCTURE: CPT

## 2023-10-22 PROCEDURE — 84165 PROTEIN E-PHORESIS SERUM: CPT

## 2023-10-22 PROCEDURE — 85025 COMPLETE CBC W/AUTO DIFF WBC: CPT

## 2023-10-22 PROCEDURE — 80053 COMPREHEN METABOLIC PANEL: CPT

## 2023-10-22 PROCEDURE — 83521 IG LIGHT CHAINS FREE EACH: CPT

## 2023-10-22 PROCEDURE — 86334 IMMUNOFIX E-PHORESIS SERUM: CPT

## 2023-10-23 LAB
ALBUMIN SERPL ELPH-MCNC: 4.36 G/DL (ref 3.75–5.21)
ALBUMIN/GLOB SERPL: 1.54 {RATIO} (ref 1–2)
ALPHA1 GLOB SERPL ELPH-MCNC: 0.32 G/DL (ref 0.19–0.46)
ALPHA2 GLOB SERPL ELPH-MCNC: 0.86 G/DL (ref 0.48–1.05)
B-GLOBULIN SERPL ELPH-MCNC: 0.77 G/DL (ref 0.68–1.23)
GAMMA GLOB SERPL ELPH-MCNC: 0.89 G/DL (ref 0.62–1.7)
KAPPA LC FREE SER-MCNC: 3.7 MG/DL (ref 0.33–1.94)
KAPPA LC FREE/LAMBDA FREE SER NEPH: 2.86 {RATIO} (ref 0.26–1.65)
LAMBDA LC FREE SERPL-MCNC: 1.29 MG/DL (ref 0.57–2.63)
M PROTEIN 1 SERPL ELPH-MCNC: 0.22 G/DL (ref ?–0)
PROT SERPL-MCNC: 7.2 G/DL (ref 6.4–8.2)

## 2023-10-25 ENCOUNTER — OFFICE VISIT (OUTPATIENT)
Dept: SURGERY | Facility: CLINIC | Age: 83
End: 2023-10-25

## 2023-10-25 ENCOUNTER — OFFICE VISIT (OUTPATIENT)
Dept: HEMATOLOGY/ONCOLOGY | Facility: HOSPITAL | Age: 83
End: 2023-10-25
Attending: INTERNAL MEDICINE

## 2023-10-25 VITALS
TEMPERATURE: 99 F | HEART RATE: 71 BPM | SYSTOLIC BLOOD PRESSURE: 135 MMHG | WEIGHT: 124 LBS | OXYGEN SATURATION: 99 % | HEIGHT: 63 IN | BODY MASS INDEX: 21.97 KG/M2 | DIASTOLIC BLOOD PRESSURE: 68 MMHG | RESPIRATION RATE: 16 BRPM

## 2023-10-25 DIAGNOSIS — D64.9 ANEMIA, UNSPECIFIED TYPE: ICD-10-CM

## 2023-10-25 DIAGNOSIS — C50.911 MALIGNANT NEOPLASM OF RIGHT BREAST IN FEMALE, ESTROGEN RECEPTOR POSITIVE, UNSPECIFIED SITE OF BREAST: Primary | ICD-10-CM

## 2023-10-25 DIAGNOSIS — Z17.0 CARCINOMA OF UPPER-OUTER QUADRANT OF RIGHT BREAST IN FEMALE, ESTROGEN RECEPTOR POSITIVE: Primary | ICD-10-CM

## 2023-10-25 DIAGNOSIS — Z17.0 MALIGNANT NEOPLASM OF RIGHT BREAST IN FEMALE, ESTROGEN RECEPTOR POSITIVE, UNSPECIFIED SITE OF BREAST: Primary | ICD-10-CM

## 2023-10-25 DIAGNOSIS — D47.2 MONOCLONAL GAMMOPATHY: ICD-10-CM

## 2023-10-25 DIAGNOSIS — C50.411 CARCINOMA OF UPPER-OUTER QUADRANT OF RIGHT BREAST IN FEMALE, ESTROGEN RECEPTOR POSITIVE: Primary | ICD-10-CM

## 2023-10-25 PROCEDURE — 1126F AMNT PAIN NOTED NONE PRSNT: CPT | Performed by: INTERNAL MEDICINE

## 2023-10-25 PROCEDURE — 99214 OFFICE O/P EST MOD 30 MIN: CPT | Performed by: INTERNAL MEDICINE

## 2023-10-25 PROCEDURE — 99213 OFFICE O/P EST LOW 20 MIN: CPT | Performed by: SURGERY

## 2023-10-25 PROCEDURE — 3075F SYST BP GE 130 - 139MM HG: CPT | Performed by: INTERNAL MEDICINE

## 2023-10-25 PROCEDURE — 1159F MED LIST DOCD IN RCRD: CPT | Performed by: SURGERY

## 2023-10-25 PROCEDURE — 3078F DIAST BP <80 MM HG: CPT | Performed by: INTERNAL MEDICINE

## 2023-10-25 PROCEDURE — 1160F RVW MEDS BY RX/DR IN RCRD: CPT | Performed by: SURGERY

## 2023-10-25 PROCEDURE — 3008F BODY MASS INDEX DOCD: CPT | Performed by: INTERNAL MEDICINE

## 2023-10-25 NOTE — PROGRESS NOTES
Breast Surgery Surveillance Visit    Diagnosis: Right breast cancer status post lumpectomy on August 13, 2018. Stage: Cancer Staging  Malignant neoplasm of right breast in female, estrogen receptor positive ([facility] [address]  Staging form: Breast, AJCC 8th Edition  - Pathologic: pT1c, pN0, cM0 - Signed by Catalina Sharma MD on 8/29/2018     Disease Status:  Surgical treatment complete,  letrozole completed October 2023, radiation therapy declined. History of Present Illness:   Ms. Sona Mtz is a 78year old woman who presented with imaging detected right breast cancer. The patient denies any palpable masses, nipple discharge, skin changes or axillary symptoms. She has no prior history of breast disease or biopsies. She has no family history of breast cancer. She underwent a bilateral screening mammogram on July 21, 2018 and was found to have a new mass in the right breast for which additional imaging was recommended. On July 24, 2018 she underwent additional views that confirmed heterogeneously dense breast tissue with a 1 cm irregular mass in the right breast at 9:00, 7 cm from the nipple for which an ultrasound-guided biopsy was recommended. Targeted ultrasound of the right axilla demonstrated normal axillary lymph nodes. On July 27, 2018 her right breast ultrasound-guided biopsy was performed and pathology confirmed infiltrating ductal carcinoma, grade 1, ER 50%, MT 50%, HER-2/yordy negative, Ki-67 10%. She elected for lumpectomy and no judson interrogation was performed secondary to the patient's normal clinical exam and advanced age. Her surgery took place without complication. She denies any new concerns related to her breast since her last visit. She declined radiation therapy. She has been tolerating letrozole well with no significant systemic side effects and was told by her medical oncologist today that she has now completed her treatment.   She is here today for evaluation and recommendations for further therapy. Past Medical History:   Diagnosis Date    Anxiety     Breast CA ([facility] [address]     Calculus of kidney     Cancer ([facility] [address] 2018    right breast Ca    Colitis     completed antibiotic therapy for C. Diff colitis    Colon polyps     Depression     High cholesterol     Incontinence     Other general symptoms(780.99)     torn rotator cuff left arm    Visual impairment        Past Surgical History:   Procedure Laterality Date    CHOLECYSTECTOMY      2017    COLONOSCOPY  2016    CYSTO/URETERO W/LITHOTRIPSY Right 2019    Right ureteroscopy with laser. Dr. Karen Chin @ [address] [place] [address]    CYSTOSCOPY,INSERT URETERAL STENT      [address] [place] [address]      2017    FRAGMENTING OF KIDNEY STONE Right 2018    Rt. ESWL with pre-stenting, Dr. Karen Chin @ [address] [place] [address]    HYSTERECTOMY      38    LUMPECTOMY RIGHT  2018       Gynecological History:  Pt is a   Pt was 19years old at time of first pregnancy. She denies any cumulative breastfeeding history. She achieved menarche at age 11   Age of Menopause: 34  Type: Hysterectomy with ovaries preserved  She has history of hormone replacement therapy for 20 years, last in . She denies any history of oral contraceptive use. She denies infertility treatment to achieve pregnancy. Medications:    No outpatient medications have been marked as taking for the 10/25/23 encounter (Appointment) with Emma Grant MD.      Allergies:      Morphine                OTHER (SEE COMMENTS)    Comment:Pt states she had a mild seizure when Morphine was             administered too fast IM    Family History:   Family History   Problem Relation Age of Onset    Cancer Father         throat ca    Heart Disorder Mother     Hypertension Sister     Heart Disorder Brother     Breast Cancer Self         76    Breast Cancer Paternal Aunt         age unknown/poss. late 35s       She is not of Ashkenazi Sabianism ancestry.     Social History:  Alcohol use   Yes   1.0 standard drink of alcohol/week 1 Glasses of wine per week      Comment:   occasionally            Smoking status:   Former      Types:   Cigarettes      Quit date:   1998      Smokeless tobacco:   Never     The patient is . She has 2 children. She is employed part-time at [employer] as a . Review of Systems:  General:   The patient denies, fever, chills, night sweats, fatigue, generalized weakness, change in appetite or weight loss. HEENT:     The patient denies eye irritation, cataracts, redness, glaucoma, yellowing of the eyes, change in vision or color blindness. The patient denies hearing loss, ringing in the ears, ear drainage, earaches, nasal congestion, nose bleeds, snoring, pain in mouth/throat, hoarseness, change in voice, facial trauma. Respiratory:  The patient denies chronic cough, phlegm, hemoptysis, pleurisy/chest pain, pneumonia, asthma, wheezing, difficulty in breathing with exertion, emphysema, chronic bronchitis, shortness of breath or abnormal sound when breathing. Cardiovascular: There is no history of chest pain, chest pressure/discomfort, palpitations, irregular heartbeat, fainting or near-fainting, difficulty breathing when lying flat, SOB/Coughing at night, swelling of the legs or chest pain while walking. Breasts:  See history of present illness    Gastrointestinal:     There is no history of difficulty or pain with swallowing, reflux symptoms, vomiting, dark or bloody stools, constipation, yellowing of the skin, indigestion, nausea, change in bowel habits, diarrhea, abdominal pain or vomiting blood. Genitourinary:  The patient denies frequent urination,+ needing to get up at night to urinate, urinary hesitancy or retaining urine, painful urination, urinary incontinence, decreased urine stream, blood in the urine or vaginal/penile discharge. Skin:    The patient denies rash, itching, skin lesions, dry skin, change in skin color or change in moles.      Hematologic/Lymphatic:  The patient denies easily bruising or bleeding or persistent swollen glands or lymph nodes. Musculoskeletal:  The patient denies muscle aches/pain, joint pain, stiff joints, neck pain, back pain or bone pain. Neuropsychiatric:  There is no history of migraines or severe headaches, seizure/epilepsy, speech problems, coordination problems, trembling/tremors, fainting/black outs, dizziness, memory problems, loss of sensation/numbness, problems walking, weakness, tingling or burning in hands/feet. There is no history of abusive relationship, bipolar disorder, sleep disturbance, +anxiety, depression or feeling of despair. Endocrine: There is no history of poor/slow wound healing, weight loss/gain, fertility or hormone problems, cold intolerance, thyroid disease. Allergic/Immunologic:  There is no history of hives, hay fever, angioedema or anaphylaxis. There were no vitals taken for this visit. Physical Examination: This is a well-nourished, alert and oriented woman. There is not palpable cervical, supraclavicular or axillary adenopathy. The neck is supple with a midline trachea and no thyromegaly. Range of motion is good at both shoulders. Breasts are symmetrical. The tumorectomy site is in the upper outer right  breast and shows no evidence of local recurrence. Both nipples are everted with no discharge. There is not dominant masses, focal nodularity or skin changes on either side. The abdomen is soft, flat and nontender, with no palpable masses or organomegaly. Imaging:  Her most recent imaging was performed on October 19, 2023 was a bilateral diagnostic mammogram. This showed post lumpectomy changes with benign findings. Impression:   Ms. Juan Carlos Mehta is a 78year old woman presents with h/o right breast cancer s/p lumpectomy.     Discussion and Plan:  I had a discussion with the Patient regarding her breast exam. On exam today I found her to be healing well since surgery with no signs of new or recurrent disease. I personally reviewed her recent imaging which shows her to be healing well since surgery with no new concerns. She has completed her endocrine therapy per medical oncology. I anticipate she will be due for imaging of her bilateral breast  in Oct 2024. She will return for clinical exam at that time. I encouraged her to continue monitoring her ROM and strength and explained that a referral to physical therapy may be warranted in the future if she identifies any limitations or restrictions. She was encouraged to contact the office with any questions or concerns prior to her next scheduled appointment. This encounter lasted a total of 25 minutes, more than 50% of which was dedicated to the discussion of management options.

## 2023-10-27 PROBLEM — C50.411 CARCINOMA OF UPPER-OUTER QUADRANT OF RIGHT BREAST IN FEMALE, ESTROGEN RECEPTOR POSITIVE: Status: ACTIVE | Noted: 2023-10-27

## 2023-10-27 PROBLEM — C50.411 CARCINOMA OF UPPER-OUTER QUADRANT OF RIGHT BREAST IN FEMALE, ESTROGEN RECEPTOR POSITIVE  (HCC): Status: ACTIVE | Noted: 2023-10-27

## 2023-10-27 PROBLEM — C50.411 CARCINOMA OF UPPER-OUTER QUADRANT OF RIGHT BREAST IN FEMALE, ESTROGEN RECEPTOR POSITIVE (HCC): Status: ACTIVE | Noted: 2023-10-27

## 2023-10-27 PROBLEM — Z17.0 CARCINOMA OF UPPER-OUTER QUADRANT OF RIGHT BREAST IN FEMALE, ESTROGEN RECEPTOR POSITIVE: Status: ACTIVE | Noted: 2023-10-27

## 2023-10-27 PROBLEM — Z17.0 CARCINOMA OF UPPER-OUTER QUADRANT OF RIGHT BREAST IN FEMALE, ESTROGEN RECEPTOR POSITIVE (HCC): Status: ACTIVE | Noted: 2023-10-27

## 2023-10-27 PROBLEM — Z17.0 CARCINOMA OF UPPER-OUTER QUADRANT OF RIGHT BREAST IN FEMALE, ESTROGEN RECEPTOR POSITIVE  (HCC): Status: ACTIVE | Noted: 2023-10-27

## 2023-10-29 NOTE — TELEPHONE ENCOUNTER
Please review; protocol failed. Or has no protocol    Requested Prescriptions   Pending Prescriptions Disp Refills    ZOLPIDEM 5 MG Oral Tab [Pharmacy Med Name: Zolpidem Tartrate 5 Mg Tab Nort] 30 tablet 0     Sig: Take 1 tablet (5 mg total) by mouth nightly as needed for Sleep. There is no refill protocol information for this order           Recent Outpatient Visits              4 days ago Malignant neoplasm of right breast in female, estrogen receptor positive, unspecified site of breast     [facility] Hematology Oncology Gregoria Urbina MD    Office Visit    4 days ago Carcinoma of upper-outer quadrant of right breast in female, estrogen receptor positive     Diana Ashley MD    Office Visit    5 months ago Primary insomnia    Casi Munguia MD    Office Visit    6 months ago Malignant neoplasm of right breast in female, estrogen receptor positive, unspecified site of breast [facility] Hematology Oncology Gregoria Urbina MD    Office Visit    7 months ago Casi Harvey MD    Office Visit           Future Appointments         Provider Department Appt Notes    In 3 days Sundeep Hendricks MD [address], [address], [place] hip pain    In 2 months Jacinta Cardenas MD [address], [address] [facility] px, last px 3/17/2022    In 6 months [address] Hematology Oncology follow up visit. cl  6m

## 2023-10-30 RX ORDER — ZOLPIDEM TARTRATE 5 MG/1
5 TABLET ORAL NIGHTLY PRN
Qty: 30 TABLET | Refills: 0 | Status: SHIPPED | OUTPATIENT
Start: 2023-10-30

## 2023-10-30 NOTE — TELEPHONE ENCOUNTER
Verified name and . Patient calling to follow up on Zolpidem refill request. She states that she only has one pill left for tonight and is requesting that prescription be sent today or tomorrow. She is asking that another message be sent to Dr. Sukhdeep Quinn with high priority.

## 2023-11-01 ENCOUNTER — HOSPITAL ENCOUNTER (OUTPATIENT)
Dept: GENERAL RADIOLOGY | Facility: HOSPITAL | Age: 83
Discharge: HOME | End: 2023-11-01
Attending: ORTHOPAEDIC SURGERY
Payer: MEDICARE

## 2023-11-01 ENCOUNTER — OFFICE VISIT (OUTPATIENT)
Dept: ORTHOPEDICS CLINIC | Facility: CLINIC | Age: 83
End: 2023-11-01
Payer: MEDICARE

## 2023-11-01 DIAGNOSIS — M25.551 PAIN OF RIGHT HIP: ICD-10-CM

## 2023-11-01 DIAGNOSIS — M25.551 PAIN OF RIGHT HIP: Primary | ICD-10-CM

## 2023-11-01 DIAGNOSIS — G89.29 CHRONIC RIGHT-SIDED LOW BACK PAIN WITHOUT SCIATICA: ICD-10-CM

## 2023-11-01 DIAGNOSIS — M54.50 CHRONIC RIGHT-SIDED LOW BACK PAIN WITHOUT SCIATICA: ICD-10-CM

## 2023-11-01 PROCEDURE — 73502 X-RAY EXAM HIP UNI 2-3 VIEWS: CPT | Performed by: ORTHOPAEDIC SURGERY

## 2023-11-01 PROCEDURE — 1160F RVW MEDS BY RX/DR IN RCRD: CPT | Performed by: ORTHOPAEDIC SURGERY

## 2023-11-01 PROCEDURE — 99213 OFFICE O/P EST LOW 20 MIN: CPT | Performed by: ORTHOPAEDIC SURGERY

## 2023-11-01 PROCEDURE — 1159F MED LIST DOCD IN RCRD: CPT | Performed by: ORTHOPAEDIC SURGERY

## 2023-11-01 NOTE — PROGRESS NOTES
NURSING INTAKE COMMENTS: Patient presents with:  Hip Pain: Consult -  R hip -  Pt has been having  pain in hip for a couple of months. Pain is worse in the morning. Rates pain 6/10. Shimon any numbness or tingling. Pt states it is hard for her to bend over. HPI: This 78year old female presents today with complaints of pain in the \"right hip. \"  The pain is in the posterior area of her upper buttock. It is exacerbated by bending over. Past Medical History:   Diagnosis Date    Anxiety     Breast CA ([address]     Calculus of kidney     Cancer ([address] 08/2018    right breast Ca    Colitis     completed antibiotic therapy for C. Diff colitis    Colon polyps     Depression     High cholesterol     Incontinence     Other general symptoms(780.99)     torn rotator cuff left arm    Visual impairment      Past Surgical History:   Procedure Laterality Date    CHOLECYSTECTOMY      6/30/2017    COLONOSCOPY  2016    CYSTO/URETERO W/LITHOTRIPSY Right 01/04/2019    Right ureteroscopy with laser. Dr. Emilie Hernández @ [address] [place] [address]    CYSTOSCOPY,INSERT URETERAL STENT      [address] [place] [address]      5/21/2017    FRAGMENTING OF KIDNEY STONE Right 11/16/2018    Rt. ESWL with pre-stenting, Dr. Emilie Hernández @ [address] [place] [address]    HYSTERECTOMY      38    LUMPECTOMY RIGHT  08/2018     Current Outpatient Medications   Medication Sig Dispense Refill    zolpidem 5 MG Oral Tab Take 1 tablet (5 mg total) by mouth nightly as needed for Sleep. 30 tablet 0    clonazePAM 0.5 MG Oral Tab Take 1 tablet (0.5 mg total) by mouth 3 (three) times daily as needed for Anxiety. 270 tablet 0    LETROZOLE 2.5 MG Oral Tab TAKE 1 TABLET(2.5 MG) BY MOUTH DAILY 90 tablet 1    atorvastatin 20 MG Oral Tab Take 1 tablet (20 mg total) by mouth daily. 90 tablet 1    Multiple Vitamins-Minerals (WOMENS 50+ MULTI VITAMIN/MIN) Oral Tab Take by mouth.          Morphine                OTHER (SEE COMMENTS)    Comment:Pt states she had a mild seizure when Morphine was             administered too fast IM  Family History   Problem Relation Age of Onset    Cancer Father         throat ca    Heart Disorder Mother     Hypertension Sister     Heart Disorder Brother     Breast Cancer Self         76    Breast Cancer Paternal Aunt         age unknown/poss. late 35s       Social History    Occupational History      Not on file    Tobacco Use      Smoking status: Former        Types: Cigarettes        Quit date:         Years since quittin.8      Smokeless tobacco: Never    Vaping Use      Vaping Use: Never used    Substance and Sexual Activity      Alcohol use: Yes        Alcohol/week: 1.0 standard drink of alcohol        Types: 1 Glasses of wine per week        Comment: occasionally      Drug use: No      Sexual activity: Not on file       Review of Systems:  GENERAL: feels generally well, no significant weight loss or weight gain  SKIN: no ulcerated or worrisome skin lesions  EYES:denies blurred vision or double vision  HEENT: denies new nasal congestion, sinus pain or ST  LUNGS: denies shortness of breath  CARDIOVASCULAR: denies chest pain  GI: no hematemesis, no worsening heartburn, no diarrhea  : no dysuria, no blood in urine, no difficulty urinating, no incontinence  MUSCULOSKELETAL: no other musculoskeletal complaints other than in HPI  NEURO: no numbness or tingling, no weakness or balance disorder  PSYCHE: no depression or anxiety  HEMATOLOGIC: no hx of blood dyscrasia  ENDOCRINE: no thyroid or diabetes issues  ALL/ASTHMA: no new hx of severe allergy or asthma    Physical Examination:    There were no vitals taken for this visit. Constitutional: appears well hydrated, alert and responsive, no acute distress noted  Extremities: Normal gait. No pain with hip rotation. Negative Stinchfield test.  Slightly positive straight leg raise test on the right.       Imaging: [facility] KIARA 2D+3D DIAGNOSTIC NAMRATA  BILAT (CPT=77066/87190)    Result Date: 10/19/2023  PROCEDURE: [facility] KIARA 2D+3D DIAGNOSTIC NAMRATA BILAT (CPT=77066/17754) COMPARISON: [facility], [facility]. [facility], [facility] KIARA 2D+3D DIAGNOSTIC NAMRATA BILAT (VRW=[id number]), 10/05/2020, 1:05 PM.  [facility], [facility]. [facility], [facility] KIARA 2D+3D DIAGNOSTIC NAMRATA RIGHT (UTX=[id number]), 4/06/2021, 10:39 AM.  [facility], [facility]. [facility], [facility] KIARA 2D+3D DIAGNOSTIC NAMRATA BILAT (EVT=[id number]), 10/13/2021, 10:26 AM.  [facility], , [facility] KIARA 2D+3D DIAGNOSTIC NAMRATA BILAT (GWM=[id number]), 10/13/2022, 10:50 AM.  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 3D tomosynthesis was performed and reviewed. BREAST COMPOSITION:   Category c-Heterogeneously dense, which may obscure small masses. FINDINGS: There is no suspicious asymmetry, mass, architectural distortion, or microcalcifications identified in either breast.           CONCLUSION:   There is no mammographic evidence of malignancy in either breast. As long as patient's clinical breast exam remains unchanged, annual screening mammogram is recommended. BI-RADS CATEGORY:   DIAGNOSTIC CATEGORY 1--NEGATIVE ASSESSMENT. RECOMMENDATIONS:  ROUTINE MAMMOGRAM AND CLINICAL EVALUATION IN 12 MONTHS. PLEASE NOTE: NORMAL MAMMOGRAM DOES NOT EXCLUDE THE POSSIBILITY OF BREAST CANCER. A CLINICALLY SUSPICIOUS PALPABLE LUMP SHOULD BE BIOPSIED. For patients over the age of 36, the target due date for the patient's next mammogram has been entered into a reminder system.    Patient received a discharge summary from the technologist after completion of exam.  Breast marker legend used on images  Triangle = Palpable lump Gaston = Skin tag or mole BB = Nipple Linear han = Scar Square = Pain    Dictated by (CST): Jenna Haynes 281, DO on 10/19/2023 at 11:14 AM     Finalized by (CST): Bob Portillo, DO on 10/19/2023 at 11:19 AM             Lab Results   Component Value Date    WBC 7.6 10/22/2023    HGB 12.1 10/22/2023    .0 10/22/2023      Lab Results   Component Value Date    GLU 92 10/22/2023    BUN 14 10/22/2023    CREATSERUM 0.78 10/22/2023    GFRNAA 71 04/14/2022    GFRAA 81 04/14/2022        Assessment and Plan:  Diagnoses and all orders for this visit:    Pain of right hip  -     XR HIP W OR WO PELVIS 2 OR 3 VIEWS, RIGHT (CPT=73502); Future    Chronic right-sided low back pain without sciatica        Assessment: Her right \"hip\" pain is referred from her lumbar spine. Plan: I suggested physical therapy which she was not interested in. She does not want to consider surgical management. I recommended consultation with physiatry. The above note was creating using Dragon speech recognition technology. Please excuse any typos.     Essence Cabzeas MD

## 2023-11-13 DIAGNOSIS — F32.9 REACTIVE DEPRESSION: ICD-10-CM

## 2023-11-14 RX ORDER — CLONAZEPAM 0.5 MG/1
0.5 TABLET ORAL 3 TIMES DAILY PRN
Qty: 270 TABLET | Refills: 0 | Status: SHIPPED | OUTPATIENT
Start: 2023-11-14

## 2023-11-14 NOTE — TELEPHONE ENCOUNTER
Please review. Protocol failed or has no protocol. Requested Prescriptions   Pending Prescriptions Disp Refills    CLONAZEPAM 0.5 MG Oral Tab [Pharmacy Med Name: CLONAZEPAM 0.5MG TABLETS] 270 tablet 0     Sig: TAKE 1 TABLET(0.5 MG) BY MOUTH THREE TIMES DAILY AS NEEDED FOR ANXIETY       There is no refill protocol information for this order          Recent Outpatient Visits              1 week ago Pain of right hip    [address] [place] [address] Client, Ashkan Murphy MD    Office Visit    2 weeks ago Malignant neoplasm of right breast in female, estrogen receptor positive, unspecified site of breast     [facility] Joe Pratt MD    Office Visit    2 weeks ago Carcinoma of upper-outer quadrant of right breast in female, estrogen receptor positive     Ashley Celestin MD    Office Visit    5 months ago Primary insomnia    [facility], [employer], Val Danielle MD    Office Visit    6 months ago Malignant neoplasm of right breast in female, estrogen receptor positive, unspecified site of breast [facility])    [facility] Joe Pratt MD    Office Visit            Future Appointments         Provider Department Appt Notes    In 1 month Patti Chery MD [address], [place] px, last px 3/17/2022    In 5 months Patria Ga, [facility] Hematology Oncology follow up visit. cl  6m

## 2023-11-14 NOTE — TELEPHONE ENCOUNTER
Patient on higher dose more frequent of taking controlled substance-needs follow-up appointment-telemedicine  , Refilled this time

## 2023-11-19 ENCOUNTER — TELEPHONE (OUTPATIENT)
Dept: INTERNAL MEDICINE CLINIC | Facility: CLINIC | Age: 83
End: 2023-11-19

## 2023-11-19 NOTE — TELEPHONE ENCOUNTER
Coleen; on call MD  Paged by pt; complaining of right lower back pain going to the right hip, going on for several weeks. No urinary symptoms/fevers. Had seen Dr Jay Jacobson ortho last week, not from hip but from spine. Pt didn't want PT since was gettng back pain worse in the past. Asking for some pain med at least to help her today. Had tried heating pad already. She doesn't want to go to IC. I told her needs to ffup with PCP tomorrow or available MD so she can be checked for her lower back. She is ?allergic to codeine and not also told I dont prescribe narcotics over the phone for pt I had not evaluated/examined myself. I told her to try ES tylenol and also do voltaren gel at least for today. See clinic tomorrow. Pt agreed.

## 2023-11-20 ENCOUNTER — TELEPHONE (OUTPATIENT)
Dept: INTERNAL MEDICINE CLINIC | Facility: CLINIC | Age: 83
End: 2023-11-20

## 2023-11-20 NOTE — TELEPHONE ENCOUNTER
Patient (name and  verified) calling about her chronic back pain from her osteoarthritis. Patient is using Voltaren gel, and OTC Tylenol pain medication for arthritis. Patient wanted to confirm the instructions given by the MD yesterday that she talked to were correct. Patient states it is helping her. Offered an appt but patient declines at this time.

## 2023-11-27 ENCOUNTER — TELEPHONE (OUTPATIENT)
Dept: INTERNAL MEDICINE CLINIC | Facility: CLINIC | Age: 83
End: 2023-11-27

## 2023-11-27 NOTE — TELEPHONE ENCOUNTER
Pt called back, reviewed Dr Evita Membreno orders below with pt. Also, has pending appt with Dr Evita Membreno on 12/28/23 @ 12:40 pm. Instructed pt to keep her appt and agreed with plan and thankful for her pain medication refill.     Please reply to pool: JORGE ALBERTO Gary

## 2023-11-28 ENCOUNTER — OFFICE VISIT (OUTPATIENT)
Dept: INTERNAL MEDICINE CLINIC | Facility: CLINIC | Age: 83
End: 2023-11-28

## 2023-11-28 VITALS
HEIGHT: 63 IN | HEART RATE: 79 BPM | SYSTOLIC BLOOD PRESSURE: 129 MMHG | DIASTOLIC BLOOD PRESSURE: 66 MMHG | RESPIRATION RATE: 20 BRPM | BODY MASS INDEX: 21.97 KG/M2 | WEIGHT: 124 LBS

## 2023-11-28 DIAGNOSIS — M54.31 SCIATICA OF RIGHT SIDE: ICD-10-CM

## 2023-11-28 DIAGNOSIS — M25.551 RIGHT HIP PAIN: Primary | ICD-10-CM

## 2023-11-28 PROCEDURE — 3078F DIAST BP <80 MM HG: CPT | Performed by: INTERNAL MEDICINE

## 2023-11-28 PROCEDURE — 1159F MED LIST DOCD IN RCRD: CPT | Performed by: INTERNAL MEDICINE

## 2023-11-28 PROCEDURE — 3074F SYST BP LT 130 MM HG: CPT | Performed by: INTERNAL MEDICINE

## 2023-11-28 PROCEDURE — 1125F AMNT PAIN NOTED PAIN PRSNT: CPT | Performed by: INTERNAL MEDICINE

## 2023-11-28 PROCEDURE — 3008F BODY MASS INDEX DOCD: CPT | Performed by: INTERNAL MEDICINE

## 2023-11-28 PROCEDURE — 99214 OFFICE O/P EST MOD 30 MIN: CPT | Performed by: INTERNAL MEDICINE

## 2023-11-28 PROCEDURE — 1160F RVW MEDS BY RX/DR IN RCRD: CPT | Performed by: INTERNAL MEDICINE

## 2023-11-28 RX ORDER — CLONAZEPAM 0.5 MG/1
0.5 TABLET ORAL NIGHTLY PRN
COMMUNITY
Start: 2023-11-28

## 2023-11-28 RX ORDER — METHYLPREDNISOLONE 4 MG/1
TABLET ORAL
Qty: 1 EACH | Refills: 0 | Status: SHIPPED | OUTPATIENT
Start: 2023-11-28

## 2023-11-28 RX ORDER — ZOLPIDEM TARTRATE 5 MG/1
5 TABLET ORAL NIGHTLY PRN
Qty: 30 TABLET | Refills: 0 | Status: SHIPPED | OUTPATIENT
Start: 2023-11-28

## 2023-11-28 RX ORDER — CYCLOBENZAPRINE HCL 10 MG
10 TABLET ORAL EVERY EVENING
Qty: 20 TABLET | Refills: 0 | Status: SHIPPED | OUTPATIENT
Start: 2023-11-28 | End: 2023-12-04

## 2023-11-28 NOTE — TELEPHONE ENCOUNTER
Please review. Protocol failed or has no protocol. Requested Prescriptions   Pending Prescriptions Disp Refills    ZOLPIDEM 5 MG Oral Tab [Pharmacy Med Name: Zolpidem Tartrate 5 Mg Tab Nort] 30 tablet 0     Sig: Take 1 tablet (5 mg total) by mouth nightly as needed for Sleep. There is no refill protocol information for this order          Recent Outpatient Visits              3 weeks ago Pain of right hip    Karime Mccollum MD    Office Visit    1 month ago Malignant neoplasm of right breast in female, estrogen receptor positive, unspecified site of breast     [facility] Hematology Oncology Kyle Marcano MD    Office Visit    1 month ago Carcinoma of upper-outer quadrant of right breast in female, estrogen receptor positive     [facility] [place] [place], MD    Office Visit    6 months ago Primary insomnia    [facility] [facility], Arline Penn MD    Office Visit    7 months ago Malignant neoplasm of right breast in female, estrogen receptor positive, unspecified site of breast [facility] Hematology Oncology Kyle Marcano MD    Office Visit            Future Appointments         Provider Department Appt Notes    Today Ellen Ziegler MD [facility], Heide Chilel R hip Pain. wantsTylenol #3, OTC didnt work. 11/19  TE w/ Dr. Lisy Carlson, 11/20 Cond Update, REFILLS    In 1 month MD Sharonda Aguilar [place] px, last px 3/17/2022    In 5 months Daphney Ga [address] Hematology Oncology follow up visit. cl  6m

## 2023-12-01 ENCOUNTER — TELEPHONE (OUTPATIENT)
Dept: INTERNAL MEDICINE CLINIC | Facility: CLINIC | Age: 83
End: 2023-12-01

## 2023-12-01 DIAGNOSIS — M25.551 RIGHT HIP PAIN: Primary | ICD-10-CM

## 2023-12-01 NOTE — TELEPHONE ENCOUNTER
Spoke to patient. She was asking how long it will take for her medication to kick in for pain relief. Rn relayed the steroid can take a few days. RN reviewed medication list and said that she can take everything she was prescribed but relayed that these medications can have a compounding affect and can make one very dizzy/tired. Also relayed to take it easy as she was starting to feel a little better and ran errands and now feels like she is back at square one. She asked about the physiatry referral that Dr. Ascencion Neely mentioned at her office visit. Rn relayed that she has a referral in the system from Dr. Haseeb Wiseman. She said that she doesn't want to use that referral.     11/25/23 office note:   Will give joint Medrol Dosepak and give Flexeril, she is aware of the side effects that make me make her dizzy or drowsy and not to drive or operate machinery after taking it  Gave written instructions to the patient  Follow-up with physiatry and continue icing as that has been helping    Dr. Ascencion Neely, please advise on pended referral.

## 2023-12-01 NOTE — TELEPHONE ENCOUNTER
If no improvement, patient will call back on Monday 12/4/23 . RN called patient. Patient's date of birth and full name both confirmed. RN informed of referral given to patient with number . Advised to call. She asks additional questions - asking when this will be healed. She is on day 3 of medrol dose pack  She felt better yesterday. But after running errands at the store - pain came back and it was frustrating to her. RN advised smaller time-frames for tasks, and divide tasks, to allow body to rest.   Patient says she did this today and noticed her pain has been more manageable today. RN provided education on Medrol Dose Pack, ice packs (per patient heat does not help her). Encouraged rest, hydration, icing, medications as prescribed. She's requesting refill Tylenol #3 on Monday. But she is aware that this request might be declined because it's a controlled substance. Patient would like to just continue treatment plan for the weekend. She Has 3 tablets left of Tylenol #3 - enough until Monday. (takes once a day to control pain with success)    Advised patient to call back, on Nurse Triage line, on Monday if no improvement on Pain, to request refill of Tylenol #3.

## 2023-12-04 ENCOUNTER — TELEPHONE (OUTPATIENT)
Dept: FAMILY MEDICINE CLINIC | Facility: CLINIC | Age: 83
End: 2023-12-04

## 2023-12-04 NOTE — TELEPHONE ENCOUNTER
Pt calling for update. Pt states she finished medrol dose pack yesterday. Pain seems to be better but still there. Pt states she wakes up in the morning feeling fine, but then by evening, pains comes back again. Not severe. Pt requesting for another round of medrol dose pack before she sees Dr. Karely Huerta to see this will help with pain. Pt states she cannot come in for an appointment today, unable to move around much and get out of the house. Pt taking cyclobenzaprine and Ambien in the evening which helps. Please advise.

## 2023-12-05 ENCOUNTER — OFFICE VISIT (OUTPATIENT)
Dept: PHYSICAL MEDICINE AND REHAB | Facility: CLINIC | Age: 83
End: 2023-12-05
Payer: MEDICARE

## 2023-12-05 DIAGNOSIS — M67.959 TENDINOPATHY OF GLUTEUS MEDIUS: Primary | ICD-10-CM

## 2023-12-05 DIAGNOSIS — M25.551 RIGHT HIP PAIN: ICD-10-CM

## 2023-12-05 DIAGNOSIS — M54.31 SCIATICA OF RIGHT SIDE: ICD-10-CM

## 2023-12-05 PROCEDURE — 1159F MED LIST DOCD IN RCRD: CPT | Performed by: PHYSICAL MEDICINE & REHABILITATION

## 2023-12-05 PROCEDURE — 99204 OFFICE O/P NEW MOD 45 MIN: CPT | Performed by: PHYSICAL MEDICINE & REHABILITATION

## 2023-12-05 PROCEDURE — 20552 NJX 1/MLT TRIGGER POINT 1/2: CPT | Performed by: PHYSICAL MEDICINE & REHABILITATION

## 2023-12-05 RX ORDER — METHYLPREDNISOLONE 4 MG/1
TABLET ORAL
Qty: 21 TABLET | Refills: 0 | OUTPATIENT
Start: 2023-12-05

## 2023-12-05 NOTE — PATIENT INSTRUCTIONS
Trigger point Injection Information  What to expect: The injection contains Lidocaine (which numbs the area)   You may have pain relief within hours of the injection due to the Lidocaine. It is also possible to have a slight increase in symptoms over the first few days, but that should resolve fairly quickly. How long will the injection last?: The length of response to an injection is variable. Literally a couple days to a couple weeks. Activity Recommendations: For the first 24 hours after injection, keep the area clean and dry. It is ok to shower but don't soak in a tub during that time. No vigorous activity such as running or heavy lifting for the first week but other than that you can gradually resume your normal activities immediately. If you have a significant decrease in pain, be careful not to do too much too soon. Again, the key is GRADUAL resumption of activites. Things to look out for: Common injection side effects include soreness at the injection site, bruising, flushing of the face or skin. Infection is very rare but please notify my office [facility] for [address] [phone]) if you develop any fevers, drainage from the injection site, or severe increase in pain. If it is the weekend, go to an Emergency Room.       4 weeks in office or video

## 2023-12-05 NOTE — PROCEDURES
Procedure: Trigger point injections    Indication: right buttock pain    Consent: Informed consent was obtained from patient. Patient was explained the risks, benefits and alternatives of procedure, risks including but not limited to bleeding, infection, damage to surrounding structures, allergy / anaphylaxis, vasovagal reaction, worsening pain. She was given the opportunity to ask questions, and gave consent. Signed consent placed in chart. Procedure: Correct patient, procedure, and site was verified. Painful tender / trigger points were identified by palpation and pain response in the right gluteus medius muscles. Alcohol was applied topically to the skin. Using a 25 gauge needle (aspirating during insertion), 0.5 cc of 1% lidocaine  was injected into each trigger point. Pressure with a gauze pad was held briefly upon the site of puncture to minimize bleeding and to further spread anaesthetic subcutaneously.    Complications: Patient tolerated procedure well with no immediate complications    Estimated blood loss: minimal    Disposition: home; patient educated on and given written home care instructions    Follow up: as needed if symptoms worsen / do not improve    Angelo Hloman DO, FAAPMR & CAQSM  Physical Medicine and Rehabilitation/Sports Medicine  [facility]

## 2023-12-05 NOTE — TELEPHONE ENCOUNTER
We typically do not repeat the medrol dose natalie. I see she had her physiatry appointment and had an injection. She should update us or Dr. Cortney Moreau if pain does not improve with this.

## 2023-12-05 NOTE — PROGRESS NOTES
[address]  NEW PATIENT EVALUATION    Consultation as a request of Dr. Luna Pickens:     Chief Complaint   Patient presents with    New Patient     Right hip pain. . Pain is a 8/10. . Muscle relaxers for a week and has not provided relieve. This happened the day after thanksgiving and denies pain. XR right hip. Denies tingling and numbness and describes pain as shooting down the right leg. The patient is a 78year old female with significant past medical history of anxiety, breast cancer, colitis, hyperlipidemia who presents with right lateral hip pain. Patient states occasionally the pain radiates down the right leg however it is not constant. She has had x-ray imaging of the right hip at outside institution which is notable for mild right hip joint osteoarthritis. Started right after Thanksgiving. She has tried Tylenol with codeine. This has not provided any improvement. She denies any injury or trauma. Does report right-sided low back pain. She denies any pain with sitting more so with standing and walking. Pain is rated 7 out of 10 today. Patient states she has completed Medrol Dosepak and muscle relaxer medicine without any significant change. She did see orthopedic surgery and was referred to physiatry for further treatment options. Pain can radiate in the right leg in the posterior aspect into the hamstring and calf down to the foot. She denies any weakness, no falls, no fevers chills or weight loss. She denies any loss of bowel bladder control but does report some constipation with Tylenol with codeine. PHYSICAL EXAM:   BP (P) 132/70     Gait  Able to toe walk and heel walk without any difficulty    LUMBAR SPINE:  Inspection: no erythema, swelling, or obvious deformity. Their iliac crest and shoulder heights are symmetrical.     Palpation: Non tender to palpation of the spinous process.  TTP of right gluteus medius  ROM: FAROM  Strength: 5/5 in bilateral lower extremities  Sensation: Intact to light touch in all dermatomes of the lower extremities  Reflexes: 1/4 at L4 and S1  Facet Loading: no specific facet pain  Straight leg raise: negative for radicular pain symptoms  KHURRAM: Negative for groin pain but positive for lateral hip pain  FADIR: Negative      IMAGING:     The right hip was personally reviewed which is notable for mild osteoarthritis of the hip joint with no acute osseous abnormality    All imaging results were reviewed and discussed with patient. ASSESSMENT/PLAN:     1. Tendinopathy of gluteus medius        Sona Villaseñor is a 78-year-old female presenting today for evaluation of right gluteal and lateral hip pain. She may have some underlying lumbar radiculopathy as well however her pain is primarily reproduced with palpation in the gluteus medius with trigger points. I performed gluteus medius trigger point injection today. Please see procedure note for further details. Recommend she follow-up in 4 weeks. If she does not have sufficient improvement we will obtain MRI imaging of the lumbar spine. I reviewed her x-ray imaging. Advised her to use topical treatment with ice and heat and Tylenol as tolerated. The patient verbalized understanding with the plan and was in agreement. All questions/concerns were addressed and there were no barriers to learning. Please note Dragon dictation software was used to dictate this note and may result in inadvertent typos. Shane Dumont DO, FAAPMR & CAQSM  Physical Medicine and Rehabilitation  Sports and Spine Medicine    PAST MEDICAL HISTORY:     Past Medical History:   Diagnosis Date    Anxiety     Breast CA ([facility] [address]     Calculus of kidney     Cancer ([facility] [address] 08/2018    right breast Ca    Colitis     completed antibiotic therapy for C. Diff colitis    Colon polyps     Depression     High cholesterol     Incontinence     Other general symptoms(780.99) torn rotator cuff left arm    Visual impairment          PAST SURGICAL HISTORY:     Past Surgical History:   Procedure Laterality Date    CHOLECYSTECTOMY      6/30/2017    COLONOSCOPY  2016    CYSTO/URETERO W/LITHOTRIPSY Right 01/04/2019    Right ureteroscopy with laser. Dr. Ghada Bhatti @ [address] [place] [address]    CYSTOSCOPY,INSERT URETERAL STENT      [address] [place] [address]      5/21/2017    FRAGMENTING OF KIDNEY STONE Right 11/16/2018    Rt. ESWL with pre-stenting, Dr. Ghada Bhatti @ [address] [place] [address]    HYSTERECTOMY      38    LUMPECTOMY RIGHT  08/2018         CURRENT MEDICATIONS:     Current Outpatient Medications   Medication Sig Dispense Refill    cyclobenzaprine 10 MG Oral Tab Take 1 tablet (10 mg total) by mouth every evening. 20 tablet 0    zolpidem 5 MG Oral Tab Take 1 tablet (5 mg total) by mouth nightly as needed for Sleep. 30 tablet 0    clonazePAM 0.5 MG Oral Tab Take 1 tablet (0.5 mg total) by mouth nightly as needed for Anxiety. acetaminophen-codeine 300-30 MG Oral Tab Take 1 tablet by mouth 2 (two) times daily as needed for Pain. 10 tablet 0    LETROZOLE 2.5 MG Oral Tab TAKE 1 TABLET(2.5 MG) BY MOUTH DAILY 90 tablet 1    atorvastatin 20 MG Oral Tab Take 1 tablet (20 mg total) by mouth daily. 90 tablet 1    Multiple Vitamins-Minerals (WOMENS 50+ MULTI VITAMIN/MIN) Oral Tab Take by mouth.      methylPREDNISolone (MEDROL) 4 MG Oral Tablet Therapy Pack As directed. (Patient not taking: Reported on 12/5/2023) 1 each 0         ALLERGIES:     Allergies   Allergen Reactions    Morphine OTHER (SEE COMMENTS)     Pt states she had a mild seizure when Morphine was administered too fast IM         FAMILY HISTORY:     Family History   Problem Relation Age of Onset    Cancer Father         throat ca    Heart Disorder Mother     Hypertension Sister     Heart Disorder Brother     Breast Cancer Self         76    Breast Cancer Paternal Aunt         age unknown/poss.  late 35s          SOCIAL HISTORY:     Social History     Socioeconomic History    Marital status:    Tobacco Use    Smoking status: Former     Types: Cigarettes     Quit date:      Years since quittin.9    Smokeless tobacco: Never   Vaping Use    Vaping Use: Never used   Substance and Sexual Activity    Alcohol use: Yes     Alcohol/week: 1.0 standard drink of alcohol     Types: 1 Glasses of wine per week     Comment: occasionally    Drug use: No          REVIEW OF SYSTEMS:   No patient-reported data collected this visit. PHYSICAL EXAM:   General: No immediate distress  Head: Normocephalic/ Atraumatic  Eyes: Extra-occular movements intact. Ears: No auricular hematoma or deformities  Mouth: No lesions or ulcerations  Heart: peripheral pulses intact. Normal capillary refill.    Lungs: Non-labored respirations  Abdomen: No abdominal guarding  Extremities: No lower extremity edema bilaterally   Skin: No lesions noted   Cognition: alert & oriented x 3, attentive, able to follow 2 step commands, comprehention intact, spontaneous speech intact  Psychiatric: Mood and affect appropriate      LABS:   No results found for: \"EAG\", \"A1C\"  Lab Results   Component Value Date    WBC 7.6 10/22/2023    RBC 4.33 10/22/2023    HGB 12.1 10/22/2023    HCT 39.2 10/22/2023    MCV 90.5 10/22/2023    MCH 27.9 10/22/2023    MCHC 30.9 (L) 10/22/2023    RDW 12.7 10/22/2023    .0 10/22/2023    MPV 8.1 2018     Lab Results   Component Value Date    GLU 92 10/22/2023    BUN 14 10/22/2023    BUNCREA 17.9 10/22/2023    CREATSERUM 0.78 10/22/2023    ANIONGAP 6 10/22/2023    GFRNAA 71 2022    GFRAA 81 2022    CA 10.0 10/22/2023    OSMOCALC 296 (H) 10/22/2023    ALKPHO 94 10/22/2023    AST 26 10/22/2023    ALT 22 10/22/2023    BILT 0.5 10/22/2023    TP 7.7 10/22/2023    TP 7.2 10/22/2023    ALB 4.0 10/22/2023    ALB 4.36 10/22/2023    GLOBULIN 3.7 10/22/2023     10/22/2023    K 4.4 10/22/2023     10/22/2023    CO2 28.0 10/22/2023     Lab Results   Component Value Date    PTP 12.1 09/24/2021    INR 0.91 09/24/2021     Lab Results   Component Value Date    VITD 43.5 03/13/2021

## 2023-12-12 ENCOUNTER — TELEPHONE (OUTPATIENT)
Dept: PHYSICAL MEDICINE AND REHAB | Facility: CLINIC | Age: 83
End: 2023-12-12

## 2023-12-12 NOTE — TELEPHONE ENCOUNTER
Initiated retro authorization for Right gluteus medius trigger point injection with corticosteroid CPT/\[facility]\"" [address]26 () with [employer]  Status: Approved-authorization is not required per health plan        Inj done in office

## 2023-12-28 NOTE — TELEPHONE ENCOUNTER
Please review; protocol failed/No Protocol  Requested Prescriptions   Pending Prescriptions Disp Refills    ZOLPIDEM 5 MG Oral Tab [Pharmacy Med Name: Zolpidem Tartrate 5 Mg Tab Nort] 30 tablet 0     Sig: Take 1 tablet (5 mg total) by mouth nightly as needed for Sleep. There is no refill protocol information for this order        Future Appointments         Provider Department Appt Notes    In 1 week MD Jeyson Tena Ashleyberg px, last px 3/17/2022    In 1 week DO Jeyson Lozano, [address], [place] 4 wk f/up    In 4 months Nicol [address] Hematology Oncology follow up visit. cl  6m          Recent Outpatient Visits              3 weeks ago Tendinopathy of gluteus medius    [facility], [address], [facility]    Office Visit    1 month ago Right hip pain    [address] [facility] [place] Jeffrey Giordano MD    Office Visit    1 month ago Pain of right hip    Anita Rice MD    Office Visit    2 months ago Malignant neoplasm of right breast in female, estrogen receptor positive, unspecified site of breast     [facility] Hematology Oncology Denver Lopes, MD    Office Visit    2 months ago 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

## 2023-12-28 NOTE — TELEPHONE ENCOUNTER
Patient called (identified name and ),   She is down to 1 pill, requests refill of zolpidem. Routed to Dr William Quinn and to pod mate Alis WYMAN, please advise?     Future Appointments   Date Time Provider [place]   2024  1:00 PM Denver Rodriguez MD [facility]   2024  1:30 PM Grace Boston DO [facility] [address]   2024 10:30 AM Rosalee Ga  [facility] HEM ONC EMO

## 2023-12-29 RX ORDER — ZOLPIDEM TARTRATE 5 MG/1
5 TABLET ORAL NIGHTLY PRN
Qty: 30 TABLET | Refills: 0 | Status: SHIPPED | OUTPATIENT
Start: 2023-12-29

## 2024-01-04 ENCOUNTER — LAB ENCOUNTER (OUTPATIENT)
Dept: LAB | Age: 84
End: 2024-01-04
Attending: INTERNAL MEDICINE
Payer: MEDICARE

## 2024-01-04 ENCOUNTER — EKG ENCOUNTER (OUTPATIENT)
Dept: LAB | Age: 84
End: 2024-01-04
Attending: INTERNAL MEDICINE
Payer: MEDICARE

## 2024-01-04 ENCOUNTER — OFFICE VISIT (OUTPATIENT)
Dept: INTERNAL MEDICINE CLINIC | Facility: CLINIC | Age: 84
End: 2024-01-04

## 2024-01-04 VITALS
HEART RATE: 89 BPM | BODY MASS INDEX: 21.97 KG/M2 | RESPIRATION RATE: 18 BRPM | SYSTOLIC BLOOD PRESSURE: 133 MMHG | DIASTOLIC BLOOD PRESSURE: 78 MMHG | HEIGHT: 63 IN | WEIGHT: 124 LBS | OXYGEN SATURATION: 98 %

## 2024-01-04 DIAGNOSIS — Z17.0 MALIGNANT NEOPLASM OF RIGHT BREAST IN FEMALE, ESTROGEN RECEPTOR POSITIVE, UNSPECIFIED SITE OF BREAST  (HCC): ICD-10-CM

## 2024-01-04 DIAGNOSIS — F41.9 ANXIETY DISORDER, UNSPECIFIED TYPE: ICD-10-CM

## 2024-01-04 DIAGNOSIS — M41.56 SCOLIOSIS OF LUMBAR REGION DUE TO DEGENERATIVE DISEASE OF SPINE IN ADULT: ICD-10-CM

## 2024-01-04 DIAGNOSIS — R06.09 DOE (DYSPNEA ON EXERTION): ICD-10-CM

## 2024-01-04 DIAGNOSIS — F13.20 SEDATIVE, HYPNOTIC OR ANXIOLYTIC DEPENDENCE, UNCOMPLICATED (HCC): ICD-10-CM

## 2024-01-04 DIAGNOSIS — N20.0 RIGHT KIDNEY STONE: ICD-10-CM

## 2024-01-04 DIAGNOSIS — D69.2 SENILE PURPURA (HCC): ICD-10-CM

## 2024-01-04 DIAGNOSIS — N20.0 MULTIPLE KIDNEY STONES: ICD-10-CM

## 2024-01-04 DIAGNOSIS — R21 RASH: ICD-10-CM

## 2024-01-04 DIAGNOSIS — M81.0 AGE-RELATED OSTEOPOROSIS WITHOUT CURRENT PATHOLOGICAL FRACTURE: ICD-10-CM

## 2024-01-04 DIAGNOSIS — Z90.49 S/P LAPAROSCOPIC CHOLECYSTECTOMY: ICD-10-CM

## 2024-01-04 DIAGNOSIS — M51.26 LUMBAR DISC HERNIATION: ICD-10-CM

## 2024-01-04 DIAGNOSIS — Z00.00 ENCOUNTER FOR ANNUAL HEALTH EXAMINATION: ICD-10-CM

## 2024-01-04 DIAGNOSIS — C50.911 MALIGNANT NEOPLASM OF RIGHT BREAST IN FEMALE, ESTROGEN RECEPTOR POSITIVE, UNSPECIFIED SITE OF BREAST  (HCC): ICD-10-CM

## 2024-01-04 DIAGNOSIS — D47.2 GAMMOPATHY: ICD-10-CM

## 2024-01-04 DIAGNOSIS — N20.0 STONE IN RENAL PELVIS: ICD-10-CM

## 2024-01-04 DIAGNOSIS — M75.112 INCOMPLETE TEAR OF LEFT ROTATOR CUFF, UNSPECIFIED WHETHER TRAUMATIC: ICD-10-CM

## 2024-01-04 DIAGNOSIS — C50.411 CARCINOMA OF UPPER-OUTER QUADRANT OF RIGHT BREAST IN FEMALE, ESTROGEN RECEPTOR POSITIVE  (HCC): ICD-10-CM

## 2024-01-04 DIAGNOSIS — Z00.00 ENCOUNTER FOR ANNUAL HEALTH EXAMINATION: Primary | ICD-10-CM

## 2024-01-04 DIAGNOSIS — I70.0 ATHEROSCLEROSIS OF AORTA (HCC): ICD-10-CM

## 2024-01-04 DIAGNOSIS — Z17.0 CARCINOMA OF UPPER-OUTER QUADRANT OF RIGHT BREAST IN FEMALE, ESTROGEN RECEPTOR POSITIVE  (HCC): ICD-10-CM

## 2024-01-04 DIAGNOSIS — E78.5 HYPERLIPIDEMIA LDL GOAL <130: ICD-10-CM

## 2024-01-04 DIAGNOSIS — F51.01 PRIMARY INSOMNIA: ICD-10-CM

## 2024-01-04 PROBLEM — K80.10 CHRONIC CHOLECYSTITIS WITH CALCULUS: Status: RESOLVED | Noted: 2017-06-30 | Resolved: 2024-01-04

## 2024-01-04 PROBLEM — K80.50 CHOLEDOCHOLITHIASIS: Status: RESOLVED | Noted: 2017-05-17 | Resolved: 2024-01-04

## 2024-01-04 LAB
ALBUMIN SERPL-MCNC: 4.7 G/DL (ref 3.2–4.8)
ALBUMIN/GLOB SERPL: 1.7 {RATIO} (ref 1–2)
ALP LIVER SERPL-CCNC: 77 U/L
ALT SERPL-CCNC: 19 U/L
ANION GAP SERPL CALC-SCNC: 4 MMOL/L (ref 0–18)
AST SERPL-CCNC: 25 U/L (ref ?–34)
ATRIAL RATE: 78 BPM
BILIRUB SERPL-MCNC: 0.4 MG/DL (ref 0.2–1.1)
BUN BLD-MCNC: 21 MG/DL (ref 9–23)
BUN/CREAT SERPL: 27.6 (ref 10–20)
CALCIUM BLD-MCNC: 10.1 MG/DL (ref 8.7–10.4)
CHLORIDE SERPL-SCNC: 107 MMOL/L (ref 98–112)
CHOLEST SERPL-MCNC: 247 MG/DL (ref ?–200)
CO2 SERPL-SCNC: 28 MMOL/L (ref 21–32)
CREAT BLD-MCNC: 0.76 MG/DL
EGFRCR SERPLBLD CKD-EPI 2021: 77 ML/MIN/1.73M2 (ref 60–?)
FASTING PATIENT LIPID ANSWER: YES
FASTING STATUS PATIENT QL REPORTED: YES
GLOBULIN PLAS-MCNC: 2.7 G/DL (ref 2.8–4.4)
GLUCOSE BLD-MCNC: 90 MG/DL (ref 70–99)
HDLC SERPL-MCNC: 63 MG/DL (ref 40–59)
LDLC SERPL CALC-MCNC: 162 MG/DL (ref ?–100)
NONHDLC SERPL-MCNC: 184 MG/DL (ref ?–130)
OSMOLALITY SERPL CALC.SUM OF ELEC: 291 MOSM/KG (ref 275–295)
P AXIS: 65 DEGREES
P-R INTERVAL: 178 MS
POTASSIUM SERPL-SCNC: 4.2 MMOL/L (ref 3.5–5.1)
PROT SERPL-MCNC: 7.4 G/DL (ref 5.7–8.2)
Q-T INTERVAL: 370 MS
QRS DURATION: 70 MS
QTC CALCULATION (BEZET): 421 MS
R AXIS: 10 DEGREES
SODIUM SERPL-SCNC: 139 MMOL/L (ref 136–145)
T AXIS: 36 DEGREES
TRIGL SERPL-MCNC: 122 MG/DL (ref 30–149)
TSI SER-ACNC: 1.57 MIU/ML (ref 0.55–4.78)
VENTRICULAR RATE: 78 BPM
VLDLC SERPL CALC-MCNC: 24 MG/DL (ref 0–30)

## 2024-01-04 PROCEDURE — 3008F BODY MASS INDEX DOCD: CPT | Performed by: INTERNAL MEDICINE

## 2024-01-04 PROCEDURE — 36415 COLL VENOUS BLD VENIPUNCTURE: CPT

## 2024-01-04 PROCEDURE — 99499 UNLISTED E&M SERVICE: CPT | Performed by: INTERNAL MEDICINE

## 2024-01-04 PROCEDURE — 1170F FXNL STATUS ASSESSED: CPT | Performed by: INTERNAL MEDICINE

## 2024-01-04 PROCEDURE — 96160 PT-FOCUSED HLTH RISK ASSMT: CPT | Performed by: INTERNAL MEDICINE

## 2024-01-04 PROCEDURE — 84443 ASSAY THYROID STIM HORMONE: CPT

## 2024-01-04 PROCEDURE — 93010 ELECTROCARDIOGRAM REPORT: CPT | Performed by: INTERNAL MEDICINE

## 2024-01-04 PROCEDURE — 1160F RVW MEDS BY RX/DR IN RCRD: CPT | Performed by: INTERNAL MEDICINE

## 2024-01-04 PROCEDURE — 3078F DIAST BP <80 MM HG: CPT | Performed by: INTERNAL MEDICINE

## 2024-01-04 PROCEDURE — 93005 ELECTROCARDIOGRAM TRACING: CPT

## 2024-01-04 PROCEDURE — G0439 PPPS, SUBSEQ VISIT: HCPCS | Performed by: INTERNAL MEDICINE

## 2024-01-04 PROCEDURE — 1159F MED LIST DOCD IN RCRD: CPT | Performed by: INTERNAL MEDICINE

## 2024-01-04 PROCEDURE — 80061 LIPID PANEL: CPT

## 2024-01-04 PROCEDURE — 3075F SYST BP GE 130 - 139MM HG: CPT | Performed by: INTERNAL MEDICINE

## 2024-01-04 PROCEDURE — 1125F AMNT PAIN NOTED PAIN PRSNT: CPT | Performed by: INTERNAL MEDICINE

## 2024-01-04 PROCEDURE — 80053 COMPREHEN METABOLIC PANEL: CPT

## 2024-01-04 RX ORDER — BETAMETHASONE DIPROPIONATE 0.5 MG/G
1 CREAM TOPICAL 2 TIMES DAILY
Qty: 45 G | Refills: 0 | Status: SHIPPED | OUTPATIENT
Start: 2024-01-04

## 2024-01-04 NOTE — PATIENT INSTRUCTIONS
Sona Yan's SCREENING SCHEDULE   Tests on this list are recommended by your physician but may not be covered, or covered at this frequency, by your insurer.   Please check with your insurance carrier before scheduling to verify coverage.   PREVENTATIVE SERVICES FREQUENCY &  COVERAGE DETAILS LAST COMPLETION DATE   Diabetes Screening    Fasting Blood Sugar /  Glucose    One screening every 12 months if never tested or if previously tested but not diagnosed with pre-diabetes   One screening every 6 months if diagnosed with pre-diabetes Lab Results   Component Value Date    GLU 92 10/22/2023        Cardiovascular Disease Screening    Lipid Panel  Cholesterol  Lipoprotein (HDL)  Triglycerides Covered every 5 years for all Medicare beneficiaries without apparent signs or symptoms of cardiovascular disease Lab Results   Component Value Date    CHOLEST 214 (H) 04/14/2022    HDL 70 (H) 04/14/2022     (H) 04/14/2022    TRIG 93 04/14/2022         Electrocardiogram (EKG)   Covered if needed at Welcome to Medicare, and non-screening if indicated for medical reasons 12/08/2022      Ultrasound Screening for Abdominal Aortic Aneurysm (AAA) Covered once in a lifetime for one of the following risk factors   • Men who are 65-75 years old and have ever smoked   • Anyone with a family history -     Colorectal Cancer Screening  Covered for ages 50-85; only need ONE of the following:    Colonoscopy   Covered every 10 years    Covered every 2 years if patient is at high risk or previous colonoscopy was abnormal -    No recommendations at this time    Flexible Sigmoidoscopy   Covered every 4 years -    Fecal Occult Blood Test Covered annually -   Bone Density Screening    Bone density screening    Covered every 2 years after age 65 if diagnosed with risk of osteoporosis or estrogen deficiency.    Covered yearly for long-term glucocorticoid medication use (Steroids) Last Dexa Scan:    XR DEXA BONE DENSITOMETRY (CPT=77080)  02/25/2021      No recommendations at this time   Pap and Pelvic    Pap   Covered every 2 years for women at normal risk; Annually if at high risk -  No recommendations at this time    Chlamydia Annually if high risk -  No recommendations at this time   Screening Mammogram    Mammogram     Recommend annually for all female patients aged 40 and older    One baseline mammogram covered for patients aged 35-39 10/19/2023    No recommendations at this time    Immunizations    Influenza Covered once per flu season  Please get every year -  No recommendations at this time    Pneumococcal Each vaccine ([id number] & [id number]) covered once after 65 Prevnar 13: 11/14/2016    [id number]: -     Pneumococcal Vaccination(2 - PPSV23 or PCV20) due on 01/09/2017    Hepatitis B One screening covered for patients with certain risk factors   -  No recommendations at this time    Tetanus Toxoid Not covered by Medicare Part B unless medically necessary (cut with metal); may be covered with your pharmacy prescription benefits -    Tetanus, Diptheria and Pertusis TD and TDaP Not covered by Medicare Part B -  No recommendations at this time    Zoster Not covered by Medicare Part B; may be covered with your pharmacy  prescription benefits -  Zoster Vaccines(1 of 2) Never done     Annual Monitoring of Persistent Medications (ACE/ARB, digoxin diuretics, anticonvulsants)    Potassium Annually Lab Results   Component Value Date    K 4.4 10/22/2023         Creatinine   Annually Lab Results   Component Value Date    CREATSERUM 0.78 10/22/2023         BUN Annually Lab Results   Component Value Date    BUN 14 10/22/2023       Drug Serum Conc Annually No results found for: \"DIGOXIN\", \"DIG\", \"VALP\"

## 2024-01-04 NOTE — PROGRESS NOTES
Subjective:   Sona Yan is a 83 year old female who presents for a MA (Medicare Advantage) Supervisit (Once per calendar year) and scheduled follow up of multiple significant but stable problems.   PT COMES FOR HER MEDICARE PHYSICAL   Has started taking the lexapro and feels better   Last visit saw physiatry and received a shot and feels better after that         HISTORY  Reviewed note from Ortho-- she was referred to physical therapy but she was not interested bc  PT made her pain worse per pt , took T#3 last night but she is still in pain so refer to physiatry for further evaluation and treatment options     Reviewed x-ray of the right hip and noted only mild arthritis on 11/2023   lumbar x-ray in 2021 did show multilevel DJD     Taking 2 in the evening and one ambien and stil wakes up at night   Has been like this her whole life per pt   The lexapro made her \"wired \" when she took it at night   She lost her son 6 mns yest , still has a hard time with it   Declines speaking to a therapist --had long discussion and even before that with even with  normal, bereavement she may benefit from speaking to therapist     Occ the pain shoots down her leg , didn't sleep all night due to pain    8/10   Worse with certain movements   No help with heat   Better with ice packs      Sleeps only 5 hrs a night and goes to  bed at 1 am and has been like that for a yrs   Son benjamin passed away one yr   Her current issues started the day after thanksgiving            HISTORY  She states she has never slept well and gets up at night , every night -- willing to decrease how she takes the clonazepam and would like to try something for sleep         HISTORY   history reviewed from nurse and confirmed with pt   reports over the weekend, she took a train ride x 3 hrs with friends to [place], [place] (3 hours each way). Patient reports she did do a lot of walking--in and out of the car. Patient denies fall or trauma, but did notice multiple  small bruises to bilateral shins and fatigue. \"I don't know if it's because I'm 81 or what, but why would bruises show up if I didn't hit anything?\"     Patient did apply Camphor calming balm to shins and bruises have dissapated--denies calf pain or swelling--no c/o of chest pain or shortness of breath--speaking in clear, full sentences.  ------------------------------  Pt states she is under a lot of stress - son lives with her and he has diabetes and   Still has severe back pain - saw dr toeny her ortho and mri was ordered and was referred to a pain specialist   OP - couldn't toelrate fosamax and doesn't want any  meds -recommended Prolia by Endo but she is scared of side effects         HISTORY   Has h/o herniated disk of L 3 L4  And L5   Started PT 2020 and still having a lot of pain   \"im not getting anywhere \"   PT told her she needed an MRI \"  She just saw PT last week   After sitting , when she gets up - \"he pain is out of the roof \"   Noted that this is complicated with the use of letrozole  Will go for dexa this week   Points to lower back and sometimes foes down the right leg , sometimes the rigth hip is affected as well  Better- ice and heat but it comes back, doesn't like to taking anything for it , has tried tyelnol    Worse - a lot of exertion even walking like after shopping , getting up after sitting   No urinary or stool incontinence or retention     upset as to why she isnt get the covid vaccine                  History   New pt - referred  By medardo zacarias her ex DIL   Used to see dr padron in [place]   C/c establish care   C/o   mammogram and next day has apt with dr moreno and dr hurtado     in    Has encough refills   Has questions about her blood work and her her monoclonal      PMH  HL  H/o breast cancer right breast -invasive ductal carcinoma  OP -was on fosamax yrs ago but it made her nauseated   H/o pancreatitis   Anxiety   H/o kidney stones          Destinee thomas Dr      Lives with son - son lives with son   Roberto Carlos bc  3 yrs ago -  from [facility]    -----------------------------------------------------------------------------     History/Other:   Fall Risk Assessment:   She has been screened for Falls and is High Risk. Fall Prevention information provided to patient in After Visit Summary.    Do you feel unsteady when standing or walking?: No  Do you worry about falling?: Yes  Have you fallen in the past year?: No     Cognitive Assessment:   She had a completely normal cognitive assessment - see flowsheet entries     Functional Ability/Status:   Sona Yan has a completely normal functional assessment. See flowsheet for details.        Depression Screening (PHQ-2/PHQ-9): PHQ-2 SCORE: 2, done 2024   Little interest or pleasure in doing things: Several days  Feeling down, depressed, or hopeless: Several days  If you checked off any problems, how difficult have these problems made it for you to do your work, take care of things at home, or get along with other people?: Somewhat difficult  Last [place] Suicide Screening on 2024 was No Risk.          Advanced Directives:   She does have a Living Will but we do NOT have it on file in Epic.    She does have a POA but we do NOT have it on file in Epic.    Discussed Advance Care Planning with patient (and family/surrogate if present). Standard forms made available to patient in After Visit Summary.      Patient Active Problem List   Diagnosis    S/P laparoscopic cholecystectomy    Incomplete tear of left rotator cuff    Anxiety disorder    Hyperlipidemia LDL goal <130    Malignant neoplasm of right breast in female, estrogen receptor positive  (HCC)    Stone in renal pelvis    Multiple kidney stones    Right kidney stone    Lumbar disc herniation    Scoliosis of lumbar region due to degenerative disease of spine in adult    Sedative, hypnotic or anxiolytic dependence,  uncomplicated (HCC)    Atherosclerosis of aorta (HCC)    Age-related osteoporosis without current pathological fracture    Senile purpura (HCC)    Gammopathy    Primary insomnia    Carcinoma of upper-outer quadrant of right breast in female, estrogen receptor positive  (HCC)     Allergies:  She is allergic to morphine.    Current Medications:  Outpatient Medications Marked as Taking for the 1/4/24 encounter (Office Visit) with Francesca Gamez MD   Medication Sig    betamethasone dipropionate 0.05 % External Cream Apply 1 Film topically 2 (two) times daily.    zolpidem 5 MG Oral Tab Take 1 tablet (5 mg total) by mouth nightly as needed for Sleep.    cyclobenzaprine 10 MG Oral Tab Take 1 tablet (10 mg total) by mouth every evening.    clonazePAM 0.5 MG Oral Tab Take 1 tablet (0.5 mg total) by mouth nightly as needed for Anxiety.    LETROZOLE 2.5 MG Oral Tab TAKE 1 TABLET(2.5 MG) BY MOUTH DAILY    atorvastatin 20 MG Oral Tab Take 1 tablet (20 mg total) by mouth daily.    Multiple Vitamins-Minerals (WOMENS 50+ MULTI VITAMIN/MIN) Oral Tab Take by mouth.       Medical History:  She  has a past medical history of Anxiety, Breast CA (HCC), Calculus of kidney, Cancer (HCC) (08/2018), Colitis, Colon polyps, Depression, High cholesterol, Incontinence, Other general symptoms(780.99), and Visual impairment.  Surgical History:  She  has a past surgical history that includes colonoscopy (2016); ercp,diagnostic; cholecystectomy; fragmenting of kidney stone (Right, 11/16/2018); cystoscopy,insert ureteral stent; cysto/uretero w/lithotripsy (Right, 01/04/2019); lumpectomy right (08/2018); hysterectomy; and cataract.   Family History:  Her family history includes Breast Cancer in her paternal aunt and self; Cancer in her father; Heart Disorder in her brother and mother; Hypertension in her sister.  Social History:  She  reports that she quit smoking about 26 years ago. Her smoking use included cigarettes. She has never used smokeless  tobacco. She reports current alcohol use of about 1.0 standard drink of alcohol per week. She reports that she does not use drugs.    Tobacco:  She smoked tobacco in the past but quit greater than 12 months ago.  Social History    Tobacco Use      Smoking status: Former        Types: Cigarettes        Quit date:         Years since quittin.0      Smokeless tobacco: Never         CAGE Alcohol Screen:   CAGE screening score of 0 on 2024, showing low risk of alcohol abuse.      Patient Care Team:  Francesca Gamez MD as PCP - General (Internal Medicine)    Review of Systems  GENERAL: feels well otherwise  SKIN: denies any unusual skin lesions  EYES: denies blurred vision or double vision-- had cararact surg 3 mns ago   HEENT: denies nasal congestion, sinus pain or ST  LUNGS:  + shortness of breath with exertion, runs out of breath earlier than before when doing certain things  CARDIOVASCULAR: denies chest pain on exertion  GI: denies abdominal pain, denies heartburn  : denies dysuria, vaginal discharge or itching, no complaint of urinary incontinence   MUSCULOSKELETAL:  +  back pain  NEURO: denies headaches  PSYCHE: +  depression or anxiety  HEMATOLOGIC: denies hx of anemia  ENDOCRINE: denies thyroid history  ALL/ASTHMA: denies hx of allergy or asthma    Objective:   Physical Exam  GENERAL: well developed, well nourished,in no apparent distress  SKIN: no rashes,no suspicious lesions, red itchy spots on arms   HEENT: atraumatic, normocephalic,ears- right ear impacted cerumen  and throat are clear, no frontal or maxillary sinus tenderness , pupils equal and reactive to light bilaterally   NECK: supple,no adenopathy, non tender   LUNGS: clear to auscultation, no wheeze  CARDIO: RRR without murmur  GI: good BS's,no masses or tenderness  EXTREMITIES: no cyanosis, or edema  Breast exam done by Dr. Eason      /78 (BP Location: Right arm, Patient Position: Sitting, Cuff Size: adult)   Pulse 89   Resp 18    Ht 5' 3\" (1.6 m)   Wt 124 lb (56.2 kg)   SpO2 98%   BMI 21.97 kg/m²  Estimated body mass index is 21.97 kg/m² as calculated from the following:    Height as of this encounter: 5' 3\" (1.6 m).    Weight as of this encounter: 124 lb (56.2 kg).    Medicare Hearing Assessment:   Hearing Screening    Time taken: 1/4/2024  1:22 PM  Screening Method: Questionnaire  I have a problem hearing over the telephone: No I have trouble following the conversations when two or more people are talking at the same time: No   I have trouble understanding things on the TV: No I have to strain to understand conversations: No   I have to worry about missing the telephone ring or doorbell: No I have trouble hearing conversations in a noisy background such as a crowded room or restaurant: Sometimes   I get confused about where sounds come from: No I misunderstand some words in a sentence and need to ask people to repeat themselves: No   I especially have trouble understanding the speech of women and children: No I have trouble understanding the speaker in a large room such as at a meeting or place of L[employer]: No   Many people I talk to seem to mumble (or don't speak clearly): No People get annoyed because I misunderstand what they say: No   I misunderstand what others are saying and make inappropriate responses: No I avoid social activities because I cannot hear well and fear I will reply improperly: No   Family members and friends have told me they think I may have hearing loss: No                   Assessment & Plan:   Sona Yan is a 83 year old female who presents for a Medicare Assessment.     1. Encounter for annual health examination (Primary)  -     Assay, Thyroid Stim Hormone; Future; Expected date: 01/04/2024  -     Comp Metabolic Panel (14); Future; Expected date: 01/04/2024  -     Lipid Panel; Future; Expected date: 01/04/2024  -     Cancel: CARD ECHO 2D DOPPLER (CPT=93306); Future; Expected date: 01/04/2024  -      Cancel: EKG 12 Lead; Future; Expected date: 01/04/2024  -     XR CHEST PA + LAT CHEST (CPT=71046); Future; Expected date: 01/04/2024  -     CARD ECHO 2D DOPPLER (CPT=93306); Future; Expected date: 01/04/2024  -     EKG 12 Lead; Future; Expected date: 01/04/2024  Advised patient to watch what she eats and exercise, seatbelt use no texting and driving, sunscreen use advised   2. PEPE (dyspnea on exertion)  -     XR CHEST PA + LAT CHEST (CPT=71046); Future; Expected date: 01/04/2024  -     CARD ECHO 2D DOPPLER (CPT=93306); Future; Expected date: 01/04/2024  -     EKG 12 Lead; Future; Expected date: 01/04/2024  Baseline x-ray EKG and echo  3. Hyperlipidemia LDL goal <130  -     Assay, Thyroid Stim Hormone; Future; Expected date: 01/04/2024  -     Comp Metabolic Panel (14); Future; Expected date: 01/04/2024  -     Lipid Panel; Future; Expected date: 01/04/2024  -     Cancel: CARD ECHO 2D DOPPLER (CPT=93306); Future; Expected date: 01/04/2024  -     Cancel: EKG 12 Lead; Future; Expected date: 01/04/2024  -     XR CHEST PA + LAT CHEST (CPT=71046); Future; Expected date: 01/04/2024  -     CARD ECHO 2D DOPPLER (CPT=93306); Future; Expected date: 01/04/2024  -     EKG 12 Lead; Future; Expected date: 01/04/2024  Advised patient to follow low-fat low-cholesterol diet and continue medications, monitor  4. Atherosclerosis of aorta (HCC)  On statin,consider statin   5. Anxiety disorder, unspecified type  Stable on meds   6. Primary insomnia  Stable on meds   7. Sedative, hypnotic or anxiolytic dependence, uncomplicated (HCC)  Stable on meds   8. Senile purpura (HCC)  Overview:  As per visit 9/2021  Stable   9. Carcinoma of upper-outer quadrant of right breast in female, estrogen receptor positive  (HCC)  Stable ,cont meds   10. Malignant neoplasm of right breast in female, estrogen receptor positive, unspecified site of breast  (HCC)  Stable on meds ,f/u hemeonc   11. Lumbar disc herniation  Stable   12. Scoliosis of lumbar region  due to degenerative disease of spine in adult  Stable  13. Age-related osteoporosis without current pathological fracture  Overview:  Could not tolerate fosamax   Stable   14. S/P laparoscopic cholecystectomy  Stable  15. Gammopathy  Overview:  IgM kappa and IgG kappa gammopathy's - being worked up -no evidence of endorgan dysfunction-[facility]  has ordered repeat blood work     16. Incomplete tear of left rotator cuff, unspecified whether traumatic  Stable  17. Multiple kidney stones  Stable  18. Right kidney stone  Stable  19. Stone in renal pelvis  Stable  20. Rash  -     Betamethasone Dipropionate; Apply 1 Film topically 2 (two) times daily.  Dispense: 45 g; Refill: 0  Will try oint if not better refer to derm       Preventative medicine  Mammogram 10/2020 and needs rpt  in April 2021 and 10/2023 -dr landry   DEXA scan done 2/2021 -- OP   Labs from 10/2023, 4/2022  Shingles shot -advises to asked the pharmacist  Pneumonia shot - declined        The patient indicates understanding of these issues and agrees to the plan.  Reinforced healthy diet, lifestyle, and exercise.      Return in 3 months (on 4/4/2024).     Francesca Gamez MD, 1/4/2024     Supplementary Documentation:   General Health:  In the past six months, have you lost more than 10 pounds without trying?: 2 - No  Has your appetite been poor?: No  Type of Diet: Balanced  How does the patient maintain a good energy level?: Other  How would you describe your daily physical activity?: Moderate  How would you describe your current health state?: Good  How do you maintain positive mental well-being?: Social Interaction;Puzzles;Visiting Family  On a scale of 0 to 10, with 0 being no pain and 10 being severe pain, what is your pain level?: 8 - (Severe)  In the past six months, have you experienced urine leakage?: 0-No  At any time do you feel concerned for the safety/well-being of yourself and/or your children, in your home or elsewhere?: No  Have you had any  immunizations at another office such as Influenza, Hepatitis B, Tetanus, or Pneumococcal?: No         Sona Yan's SCREENING SCHEDULE   Tests on this list are recommended by your physician but may not be covered, or covered at this frequency, by your insurer.   Please check with your insurance carrier before scheduling to verify coverage.   PREVENTATIVE SERVICES FREQUENCY &  COVERAGE DETAILS LAST COMPLETION DATE   Diabetes Screening    Fasting Blood Sugar /  Glucose    One screening every 12 months if never tested or if previously tested but not diagnosed with pre-diabetes   One screening every 6 months if diagnosed with pre-diabetes Lab Results   Component Value Date    GLU 90 01/04/2024        Cardiovascular Disease Screening    Lipid Panel  Cholesterol  Lipoprotein (HDL)  Triglycerides Covered every 5 years for all Medicare beneficiaries without apparent signs or symptoms of cardiovascular disease Lab Results   Component Value Date    CHOLEST 247 (H) 01/04/2024    HDL 63 (H) 01/04/2024     (H) 01/04/2024    TRIG 122 01/04/2024         Electrocardiogram (EKG)   Covered if needed at Welcome to Medicare, and non-screening if indicated for medical reasons 01/04/2024      Ultrasound Screening for Abdominal Aortic Aneurysm (AAA) Covered once in a lifetime for one of the following risk factors    Men who are 65-75 years old and have ever smoked    Anyone with a family history -     Colorectal Cancer Screening  Covered for ages 50-85; only need ONE of the following:    Colonoscopy   Covered every 10 years    Covered every 2 years if patient is at high risk or previous colonoscopy was abnormal -    No recommendations at this time    Flexible Sigmoidoscopy   Covered every 4 years -    Fecal Occult Blood Test Covered annually -   Bone Density Screening    Bone density screening    Covered every 2 years after age 65 if diagnosed with risk of osteoporosis or estrogen deficiency.    Covered yearly for long-term  glucocorticoid medication use (Steroids) Last Dexa Scan:    XR DEXA BONE DENSITOMETRY (CPT=77080) 02/25/2021      No recommendations at this time   Pap and Pelvic    Pap   Covered every 2 years for women at normal risk; Annually if at high risk -  No recommendations at this time    Chlamydia Annually if high risk -  No recommendations at this time   Screening Mammogram    Mammogram     Recommend annually for all female patients aged 40 and older    One baseline mammogram covered for patients aged 35-39 10/19/2023    No recommendations at this time    Immunizations    Influenza Covered once per flu season  Please get every year -  No recommendations at this time    Pneumococcal Each vaccine (Dtldhdy60 & Udmhdilcs31) covered once after 65 Prevnar 13: 11/14/2016    Ztnachdzq65: -     Pneumococcal Vaccination(2 - PPSV23 or PCV20) due on 01/09/2017    Hepatitis B One screening covered for patients with certain risk factors   -  No recommendations at this time    Tetanus Toxoid Not covered by Medicare Part B unless medically necessary (cut with metal); may be covered with your pharmacy prescription benefits -    Tetanus, Diptheria and Pertusis TD and TDaP Not covered by Medicare Part B -  No recommendations at this time    Zoster Not covered by Medicare Part B; may be covered with your pharmacy  prescription benefits -  Zoster Vaccines(1 of 2) Never done     Annual Monitoring of Persistent Medications (ACE/ARB, digoxin diuretics, anticonvulsants)    Potassium Annually Lab Results   Component Value Date    K 4.2 01/04/2024         Creatinine   Annually Lab Results   Component Value Date    CREATSERUM 0.76 01/04/2024         BUN Annually Lab Results   Component Value Date    BUN 21 01/04/2024       Drug Serum Conc Annually No results found for: \"DIGOXIN\", \"DIG\", \"VALP\"

## 2024-01-05 DIAGNOSIS — M25.551 RIGHT HIP PAIN: ICD-10-CM

## 2024-01-05 DIAGNOSIS — M54.31 SCIATICA OF RIGHT SIDE: ICD-10-CM

## 2024-01-07 RX ORDER — CYCLOBENZAPRINE HCL 10 MG
10 TABLET ORAL EVERY EVENING
Qty: 20 TABLET | Refills: 0 | Status: SHIPPED | OUTPATIENT
Start: 2024-01-07

## 2024-01-07 NOTE — TELEPHONE ENCOUNTER
No protocol for requested medication.  Please advise on refill request.      Requested Prescriptions     Pending Prescriptions Disp Refills    CYCLOBENZAPRINE 10 MG Oral Tab [Pharmacy Med Name: Cyclobenzaprine Hydrochloride 10 Mg Tab Teva] 20 tablet 0     Sig: Take 1 tablet (10 mg total) by mouth every evening.      Recent Visits  Date Type Provider Dept   01/04/24 Office Visit Francesca Gamez MD [facility]   11/28/23 Office Visit Francesca Gamez MD [facility]   05/22/23 Office Visit Francesca Gamez MD [facility]   03/11/23 Office Visit Francesca Gamez MD [facility]   12/08/22 Office Visit Francesca Gamez MD [facility]   10/06/22 Office Visit Tootie Whitlock PA-C [facility]   09/19/22 Office Visit Francesca Gamez MD [facility]   Showing recent visits within past 540 days with a meds authorizing provider and meeting all other requirements  Future Appointments  Date Type Provider Dept   04/04/24 Appointment Francesca Gamez MD [facility]   Showing future appointments within next 150 days with a meds authorizing provider and meeting all other requirements     Requested Prescriptions   Pending Prescriptions Disp Refills    CYCLOBENZAPRINE 10 MG Oral Tab [Pharmacy Med Name: Cyclobenzaprine Hydrochloride 10 Mg Tab Teva] 20 tablet 0     Sig: Take 1 tablet (10 mg total) by mouth every evening.       There is no refill protocol information for this order         Future Appointments         Provider Department Appt Notes    Tomorrow Michelle Manjarrez,  [facility] 4 wk f/up    In 1 month [facility] CARD RM3 ECHO [facility] Cardiodiagnostics     In 2 months Francesca Gamez MD [facility] 3M FU    In 3 months Marcos Ga MD [facility] Hematology Oncology follow up visit.cl  6m           Recent Outpatient Visits              3 days ago Encounter for annual health examination     [facility], Francesca Cervantes MD    Office Visit    1 month ago Tendinopathy of gluteus medius    [facility] [place] Michelle Manjarrez DO    Office Visit    1 month ago Right hip pain    [place], Francesca Cervantes MD    Office Visit    2 months ago Pain of right hip    [facility] [place] Navneet Hankins MD    Office Visit    2 months ago Malignant neoplasm of right breast in female, estrogen receptor positive, unspecified site of breast     [facility] Hematology Oncology Marcos Ga MD    Office Visit

## 2024-01-08 ENCOUNTER — OFFICE VISIT (OUTPATIENT)
Dept: PHYSICAL MEDICINE AND REHAB | Facility: CLINIC | Age: 84
End: 2024-01-08
Payer: MEDICARE

## 2024-01-08 VITALS — BODY MASS INDEX: 22 KG/M2 | WEIGHT: 124 LBS | OXYGEN SATURATION: 98 % | HEART RATE: 92 BPM

## 2024-01-08 DIAGNOSIS — M67.959 TENDINOPATHY OF GLUTEUS MEDIUS: Primary | ICD-10-CM

## 2024-01-08 PROCEDURE — 99214 OFFICE O/P EST MOD 30 MIN: CPT | Performed by: PHYSICAL MEDICINE & REHABILITATION

## 2024-01-08 PROCEDURE — 1159F MED LIST DOCD IN RCRD: CPT | Performed by: PHYSICAL MEDICINE & REHABILITATION

## 2024-01-08 PROCEDURE — 1125F AMNT PAIN NOTED PAIN PRSNT: CPT | Performed by: PHYSICAL MEDICINE & REHABILITATION

## 2024-01-08 PROCEDURE — 1160F RVW MEDS BY RX/DR IN RCRD: CPT | Performed by: PHYSICAL MEDICINE & REHABILITATION

## 2024-01-08 NOTE — PATIENT INSTRUCTIONS
-Follow up in 3 months in office  -Topical treatment and home exercises  -Muscle relaxer as needed

## 2024-01-08 NOTE — PROGRESS NOTES
[facility]  OFFICE FOLLOW UP EVALUATION      HISTORY OF PRESENT ILLNESS:     Chief Complaint   Patient presents with    Follow - Up     12/05/23 LOV and TPIs for R buttock pain. States 50% improvement for about 2 weeks. Denies t/n. Pain 4/10. Flexeril nightly.        Patient is following up for right buttock pain.  Patient states good improvement after the injection.  She still has some discomfort and pain however she is able to manage this with Flexeril at nighttime.  She rates it to be 4 out of 10.  She denies any radiating symptoms, any change in strength or bowel bladder.    PHYSICAL EXAM:   Pulse 92   Wt 124 lb (56.2 kg)   SpO2 98%   BMI 21.97 kg/m²     LUMBAR SPINE:  Inspection: no erythema, swelling, or obvious deformity.  Their iliac crest and shoulder heights are symmetrical.     Palpation: Non tender to palpation of the spinous process. TTP of right gluteus medius  ROM: FAROM  Strength: 5/5 in bilateral lower extremities  Sensation: Intact to light touch in all dermatomes of the lower extremities  Reflexes: 1/4 at L4 and S1  Facet Loading: no specific facet pain  Straight leg raise: negative for radicular pain symptoms  KHURRAM: Negative for groin pain but positive for lateral hip pain  FADIR: Negative    IMAGING:     The right hip was personally reviewed which is notable for mild osteoarthritis of the hip joint with no acute osseous abnormality     All imaging results were reviewed and discussed with patient.      ASSESSMENT/PLAN:     1. Tendinopathy of gluteus medius        Sona Yan is a 83 year old female following up for gluteal tendinopathy and blood pain.  She has responded well to the injection.  Recommend we continue home exercise program.  I discussed with her the potential role of MRI imaging of the lumbar spine as she may have a herniated disc that may be compressing the nerve however at this time as the pain is well-managed she would like  to defer for any further treatment options including physical therapy.  Recommend she use topical treatment and follow-up with me in 3 months in the office.  I recommend she continue muscle relaxer as needed at nighttime.      The patient verbalized understanding with the plan and was in agreement. All questions/concerns were addressed and there were no barriers to learning.  Please note Dragon dictation software was used to dictate this note and may result in inadvertent typos.    Michelle Manjarrez DO, FAAPMR & CAQSM  Physical Medicine and Rehabilitation  Sports and Spine Medicine    PAST MEDICAL HISTORY:     Past Medical History:   Diagnosis Date    Anxiety     Breast CA (HCC)     Calculus of kidney     Cancer (HCC) 08/2018    right breast Ca    Colitis     completed antibiotic therapy for C.Diff colitis    Colon polyps     Depression     High cholesterol     Incontinence     Other general symptoms(780.99)     torn rotator cuff left arm    Visual impairment          PAST SURGICAL HISTORY:     Past Surgical History:   Procedure Laterality Date    CATARACT      CHOLECYSTECTOMY      6/30/2017    COLONOSCOPY  2016    CYSTO/URETERO W/LITHOTRIPSY Right 01/04/2019    Right ureteroscopy with laser. Dr. Wiggins @ [facility]    CYSTOSCOPY,INSERT URETERAL STENT      ERCP,DIAGNOSTIC      5/21/2017    FRAGMENTING OF KIDNEY STONE Right 11/16/2018    Rt. ESWL with pre-stenting, Dr. Wiggins @ [facility]    HYSTERECTOMY      38    LUMPECTOMY RIGHT  08/2018         CURRENT MEDICATIONS:     Current Outpatient Medications   Medication Sig Dispense Refill    cyclobenzaprine 10 MG Oral Tab Take 1 tablet (10 mg total) by mouth every evening. 20 tablet 0    betamethasone dipropionate 0.05 % External Cream Apply 1 Film topically 2 (two) times daily. 45 g 0    zolpidem 5 MG Oral Tab Take 1 tablet (5 mg total) by mouth nightly as needed for Sleep. 30 tablet 0    clonazePAM 0.5 MG Oral Tab Take 1 tablet (0.5 mg total) by mouth nightly as needed for Anxiety.       LETROZOLE 2.5 MG Oral Tab TAKE 1 TABLET(2.5 MG) BY MOUTH DAILY 90 tablet 1    atorvastatin 20 MG Oral Tab Take 1 tablet (20 mg total) by mouth daily. 90 tablet 1    Multiple Vitamins-Minerals (WOMENS 50+ MULTI VITAMIN/MIN) Oral Tab Take by mouth.           ALLERGIES:     Allergies   Allergen Reactions    Morphine OTHER (SEE COMMENTS)     Pt states she had a mild seizure when Morphine was administered too fast IM         FAMILY HISTORY:     Family History   Problem Relation Age of Onset    Cancer Father         throat ca    Heart Disorder Mother     Hypertension Sister     Heart Disorder Brother     Breast Cancer Self         77    Breast Cancer Paternal Aunt         age unknown/poss. late 30s          SOCIAL HISTORY:     Social History     Socioeconomic History    Marital status:    Tobacco Use    Smoking status: Former     Types: Cigarettes     Quit date:      Years since quittin.0    Smokeless tobacco: Never   Vaping Use    Vaping Use: Never used   Substance and Sexual Activity    Alcohol use: Yes     Alcohol/week: 1.0 standard drink of alcohol     Types: 1 Glasses of wine per week     Comment: occasionally    Drug use: No          REVIEW OF SYSTEMS:   A comprehensive 10 point review of systems was completed.  Pertinent positives and negatives noted in the the HPI.      LABS:   No results found for: \"EAG\", \"A1C\"  Lab Results   Component Value Date    WBC 7.6 10/22/2023    RBC 4.33 10/22/2023    HGB 12.1 10/22/2023    HCT 39.2 10/22/2023    MCV 90.5 10/22/2023    MCH 27.9 10/22/2023    MCHC 30.9 (L) 10/22/2023    RDW 12.7 10/22/2023    .0 10/22/2023    MPV 8.1 2018     Lab Results   Component Value Date    GLU 90 2024    BUN 21 2024    BUNCREA 27.6 (H) 2024    CREATSERUM 0.76 2024    ANIONGAP 4 2024    GFRNAA 71 2022    GFRAA 81 2022    CA 10.1 2024    OSMOCALC 291 2024    ALKPHO 77 2024    AST 25 2024    ALT 19  01/04/2024    BILT 0.4 01/04/2024    TP 7.4 01/04/2024    ALB 4.7 01/04/2024    GLOBULIN 2.7 (L) 01/04/2024     01/04/2024    K 4.2 01/04/2024     01/04/2024    CO2 28.0 01/04/2024     Lab Results   Component Value Date    PTP 12.1 09/24/2021    INR 0.91 09/24/2021     Lab Results   Component Value Date    VITD 43.5 03/13/2021

## 2024-01-22 ENCOUNTER — HOSPITAL ENCOUNTER (OUTPATIENT)
Dept: GENERAL RADIOLOGY | Facility: HOSPITAL | Age: 84
Discharge: HOME | End: 2024-01-22
Attending: INTERNAL MEDICINE
Payer: MEDICARE

## 2024-01-22 DIAGNOSIS — E78.5 HYPERLIPIDEMIA LDL GOAL <130: ICD-10-CM

## 2024-01-22 DIAGNOSIS — Z00.00 ENCOUNTER FOR ANNUAL HEALTH EXAMINATION: ICD-10-CM

## 2024-01-22 DIAGNOSIS — R06.09 DOE (DYSPNEA ON EXERTION): ICD-10-CM

## 2024-01-22 PROCEDURE — 71046 X-RAY EXAM CHEST 2 VIEWS: CPT | Performed by: INTERNAL MEDICINE

## 2024-01-27 DIAGNOSIS — M54.31 SCIATICA OF RIGHT SIDE: ICD-10-CM

## 2024-01-27 DIAGNOSIS — M25.551 RIGHT HIP PAIN: ICD-10-CM

## 2024-01-29 RX ORDER — CYCLOBENZAPRINE HCL 10 MG
10 TABLET ORAL EVERY EVENING
Qty: 20 TABLET | Refills: 0 | Status: SHIPPED | OUTPATIENT
Start: 2024-01-29

## 2024-01-29 NOTE — TELEPHONE ENCOUNTER
Please review.  Protocol failed / Has no protocol.     Requested Prescriptions   Pending Prescriptions Disp Refills    CYCLOBENZAPRINE 10 MG Oral Tab [Pharmacy Med Name: Cyclobenzaprine Hydrochloride 10 Mg Tab Teva] 20 tablet 0     Sig: Take 1 tablet (10 mg total) by mouth every evening.       There is no refill protocol information for this order        Future Appointments         Provider Department Appt Notes    In 1 week [facility] CARD RM3 ECHO [facility] Cardiodiagnostics     In 2 months Francesca Gamez MD [facility] 3M FU    In 2 months Michelle Manjarrez, DO [facility] 3 mo f/u    In 2 months Marcos Ga MD [facility] Hematology Oncology follow up visit.cl  6m           Recent Outpatient Visits              3 weeks ago Tendinopathy of gluteus medius    [facility] Michelle Manjarrez,     Office Visit    3 weeks ago Encounter for annual health examination    [facility] Francesca Gamez MD    Office Visit    1 month ago Tendinopathy of gluteus medius    [facility] Michelle Manjarrez,     Office Visit    2 months ago [facility] hip pain    [facility]    Office Visit    2 months ago Pain of right hip    [facility] Navneet Hankins MD    Office Visit

## 2024-02-09 ENCOUNTER — HOSPITAL ENCOUNTER (OUTPATIENT)
Dept: CV DIAGNOSTICS | Facility: HOSPITAL | Age: 84
Discharge: HOME | End: 2024-02-09
Attending: INTERNAL MEDICINE
Payer: MEDICARE

## 2024-02-09 DIAGNOSIS — Z00.00 ENCOUNTER FOR ANNUAL HEALTH EXAMINATION: ICD-10-CM

## 2024-02-09 DIAGNOSIS — R06.09 DOE (DYSPNEA ON EXERTION): ICD-10-CM

## 2024-02-09 DIAGNOSIS — E78.5 HYPERLIPIDEMIA LDL GOAL <130: ICD-10-CM

## 2024-02-09 PROCEDURE — 93306 TTE W/DOPPLER COMPLETE: CPT | Performed by: INTERNAL MEDICINE

## 2024-02-12 ENCOUNTER — TELEPHONE (OUTPATIENT)
Dept: INTERNAL MEDICINE CLINIC | Facility: CLINIC | Age: 84
End: 2024-02-12

## 2024-02-12 DIAGNOSIS — I37.1 PULMONARY VALVE INSUFFICIENCY, UNSPECIFIED ETIOLOGY: Primary | ICD-10-CM

## 2024-02-12 DIAGNOSIS — R06.09 DOE (DYSPNEA ON EXERTION): ICD-10-CM

## 2024-02-12 NOTE — TELEPHONE ENCOUNTER
Action Requested: Summary for Provider     []  Critical Lab, Recommendations Needed  [] Need Additional Advice  []   FYI    []   Need Orders  [] Need Medications Sent to Pharmacy  []  Other     SUMMARY: Patient stated has never had any cardiac issues nor any prosthetic valve.    She is asking for Marcelle or PCP to call her directly.   She is upset with this news and not sure if we have the right patient.    Stated does have a dtg-in law that has cardiac issues.    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 Given this and her symptoms of shortness of breath on exertion, I would like her to see the cardiologist and have placed a referral. She should call to schedule an appointment. Please let patient know. Thank you.     Reason for call: No chief complaint on file.  Onset: Data Unavailable    Called cardio diagnostic they advised it was readed by  Dr. Moctezuma @ [facility] office.    [facility] closed for today.

## 2024-02-12 NOTE — TELEPHONE ENCOUNTER
I called and spoke with the patient and advised Dr. Gamez is in clinic seeing patients right now and we do need a chance to look into this a bit. I let her know I believe that call is being placed to cardio department to confirm or reread report. I let the patient know that we will call her tomorrow with an update and advised Dr. Gamez would likely be the one to all her about results but again, we need a chance to review and determine results.     Patient very understanding, appreciated the call with an update and said tomorrow call is good once we have had time to review things.

## 2024-02-12 NOTE — TELEPHONE ENCOUNTER
Patient calling again regarding below. She is upset that Dr. Gamez did not call her regarding these results. She states her daughter in law has the same last name and her results must have been mixed up. She does not have a prosthetic pulmonary valve but her daughter in law does have stent. She would like a call from Dr. Gamez only.       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 Given this and her symptoms of shortness of breath on exertion, I would like her to see the cardiologist and have placed a referral. She should call to schedule an appointment. Please let patient know. Thank you.

## 2024-02-13 ENCOUNTER — TELEPHONE (OUTPATIENT)
Dept: INTERNAL MEDICINE CLINIC | Facility: CLINIC | Age: 84
End: 2024-02-13

## 2024-02-13 NOTE — TELEPHONE ENCOUNTER
I called and left a message for Chavez in that cardio dept with details below and we would like someone to re read the results of this patients testing. Asked he call us back in triage after completed so we can get back to patient.

## 2024-02-13 NOTE — TELEPHONE ENCOUNTER
Thank you for calling the patient  I did personally call the patient yesterday as well  I also spoke to the cardiology team yesterday and will follow-up

## 2024-02-13 NOTE — TELEPHONE ENCOUNTER
EXAM NOTE          Chief Complaint   Patient presents with   • [facility] Care   • Physical       HPI:  Francesca Farnsworth is a 34 year old female who presents to [facility] care.     She was diagnosed with Hep C 1/20/21 at her 1st prenatal visit. She was referred to GI but never saw them because she was told that she had to stop all drug use to start treatment. She smokes marijuana and doesn't plan to stop this so never saw GI. She does vape CBD.     She smokes cigarettes. She has cut down to 10 cigs a day. She is interested in smoking cessation. She had anxiety and depression when she was pregnant. She reports that this has improved since she had her daughter. She reports that she wasn't happy about being pregnant and had a rough time during her pregnancy. She is doing much better now that her daughter is here.       Patient Active Problem List   Diagnosis   • Hepatitis C   • Anemia of pregnancy     Past Medical History:   Diagnosis Date   • Anxiety and depression    • Frequent UTI    • Hepatitis C 01/20/2021   • Herpes 2008   • History of chicken pox    • History of drug abuse (CMS/[facility]) 12/12/2019    [facility] for [facility],cannabinoids,opiate,oxycodone   • History of heroin abuse (CMS/[facility])     until August 2016   • IUD (intrauterine device) in place 10/04/2021    Mirena   • Migraines    • Trichomonal infection 01/11/2021     Past Surgical History:   Procedure Laterality Date   • Abscess drainage  01/11/2021    Perineum   • Knee surgery Left     staph infection   • Oral surgery procedure      Dental     ALLERGIES:   Allergen Reactions   • Clindamycin HIVES     Family History   Problem Relation Age of Onset   • Patient is unaware of any medical problems Mother    • Patient is unaware of any medical problems Father    • Patient is unaware of any medical problems Sister    • Patient is unaware of any medical problems Sister    • Patient is unaware of any medical problems Brother      Social History  Liliana called me back just now and said that she contacted the lead at the [place] office and Dr. Moctezuma (reading cardiologist) is aware of the need for a re-read/updated report which should get done in the next 1-2 days. Liliana called the patient to explain the situation.            Tobacco Use   • Smoking status: Current Every Day Smoker     Packs/day: 0.50     Types: Cigarettes   • Smokeless tobacco: Never Used   • Tobacco comment: 1/2 pack a day smoking   Vaping Use   • Vaping Use: Every day   • Substances: CBD   • Devices: Pre-filled or refillable cartridge   Substance Use Topics   • Alcohol use: Not Currently     Alcohol/week: 1.0 standard drink     Types: 1 Glasses of wine per week   • Drug use: Yes     Frequency: 1.0 times per week     Types: Marijuana     Comment: 5 times a week, past h/o cocaine, heroin, opiates      Outpatient Medications Prior to Visit   Medication Sig Dispense Refill   • levonorgestrel (Mirena, 52 MG,) (52 MG) 20 MCG/DAY intrauterine device      • moxifloxacin (VIGAMOX) 0.5 % ophthalmic solution Place one drop left eye every 1 hour today, then every 2 hours until seen by eye MD. 9 mL 0     Facility-Administered Medications Prior to Visit   Medication Dose Route Frequency Provider Last Rate Last Admin   • levonorgestrel (MIRENA) (52 MG) 20 MCG/DAY intrauterine device 52 mg  52 mg Intrauterine Every 6 Years Oren Zafar MD   52 mg at 10/04/21 1606        REVIEW OF SYSTEMS    Constitutional:  Denies weight loss, generalized fatigue, chills, night sweats.  Eye Problem(s):negative  ENT Problem(s):negative  Cardiovascular problem(s):negative  Respiratory problem(s):negative  Gastro-intestinal problem(s):negative GI  Genito-urinary problem(s):negative  Musculoskeletal problem(s):negative  Integumentary problem(s):negative  Neurological problem(s):negative  Psychiatric problem(s):negative  Endocrine problem(s):negative  Hematologic and/or Lymphatic problem(s):negative    PHYSICAL EXAM    Visit Vitals  /90   Pulse 80   Temp 99.1 °F (37.3 °C)   Resp 14   Ht 5' 5\" (1.651 m)   Wt 61.1 kg (134 lb 12.8 oz)   LMP 01/24/2022   BMI 22.43 kg/m²     General:  Well developed, alert/oriented x 3. No acute distress.    HEENT: normocephalic. No masses, lesions, tenderness or  abnormalities  Ear: External ears normal. Canals clear. Tympanic membranes are clear with all landmarks noted.   Eye: PERRL, EOMI, sclera white and nonicteric, no conjunctival erythema, exudate or swelling; normal lids  Neck:  Supple. Nontender. Normal range of motion. No masses.   Respiratory:  Normal respiratory effort. No chest wall tenderness. Lungs clear to auscultation bilaterally. Symmetrical chest expansion.   Cardiovascular:  Regular rate and rhythm. No murmurs, rubs or gallops. Normal S1 and S2. No S3 or S4. No JVD (jugular venous distention).    Gastrointestinal:  Soft. Nontender. Nondistended. Normal bowel sounds. No pulsatile or other abdominal masses. No hepatosplenomegaly or splenomegaly.   Extremities: Good radial and dorsalis pedis pulses bilaterally. No peripheral edema. No cyanosis or clubbing.  Musculoskeletal: muscle strength 5/5 throughout all extremities, sensation intact, no muslce atrophy, joints with normal range of motion  Neuro: Gait normal. Reflexes normal and symmetric. No pathological reflexes.  Sensation grosslly normal.  Cranial Nerves 2-12 intact., Rhomberg's negative, rapid alternating movements intact, cerebellum intact  Integumentary:  Warm, clear no rashes or jaundice.    Lymphatic:  No lymphadenopathy  Psychiatric: Cooperative. Appropriate mood and affect. Normal judgment.   Extremities: no cyanosis or clubbing.       ASSESSMENT & PLAN    1. Well woman exam without gynecological exam  Pap is up to date. Next due 12/2024.  Contraception - Mirena IUD.   Pt declined immunizations today.     2. Hepatitis C virus infection without hepatic coma, unspecified chronicity  Pt tested positive for Hep C 1 year ago. Never followed up with GI for treatment. Repeat labs ordered today to check Hep C viral load, CBC and CMP.     3. Tobacco use disorder  Pt is interested in smoking cessation. Prescribed Nicotine patch 14 mg daily x 6 weeks. Will reassess at that time to determine next dose of  nicotine replacement.       Orders Placed This Encounter   • Hepatitis C Antibody With Reflex   • Comprehensive Metabolic Panel   • CBC with Automated Differential   • Hepatitis C Genotype   • nicotine (NICODERM) 14 MG/24HR patch         Return in about 1 year (around 1/28/2023) for annual physical.    Amy Ibarra MD

## 2024-02-13 NOTE — TELEPHONE ENCOUNTER
Escalation Work:   I connected with Shama, [id number] (, cardiac diagnostics) who also connected me with Liliana [id number] (, non-invasive cardiac diagnostics).     Liliana will work on getting this matter escalated with a cardiologist. She will reach out to the patient with an explanation and next steps and contact me with an overall update. I provided my direct ext to Liliana.     I relayed pt's concern that that her 2/9/24 2D echo mentions a \"prosthetic valve\" which she states she does not have. See prior notes. There is mention her daughter-in-law has same name and raises the concern of a 'mix up'.     This is FYI for anyone that may receive further calls about this.   Routing this note to Dr. Gamez as FYI

## 2024-02-14 NOTE — TELEPHONE ENCOUNTER
The result was reviewed again by the cardiologist and addended to reflect her correct result. I am not sure about this question as she did have the test performed and the result was addended. She would be best directed to the billing department. Please assist her with this. Thank you

## 2024-02-14 NOTE — TELEPHONE ENCOUNTER
Please advise  Pt called stated she thought about the error made regarding her results , asking will she be charged for the Test done d/t error, advised test was done but resulted wrong  Pt feels she should not be Billed

## 2024-02-16 NOTE — TELEPHONE ENCOUNTER
Marcelle Brunner, patient advocate, is aware and will review the patient's request for the charge reversal for the test.

## 2024-02-17 DIAGNOSIS — F51.01 PRIMARY INSOMNIA: ICD-10-CM

## 2024-02-19 RX ORDER — ZOLPIDEM TARTRATE 5 MG/1
5 TABLET ORAL NIGHTLY PRN
Qty: 30 TABLET | Refills: 0 | Status: SHIPPED | OUTPATIENT
Start: 2024-02-19

## 2024-02-19 NOTE — TELEPHONE ENCOUNTER
Please review; protocol failed/ has no protocol    Requested Prescriptions   Pending Prescriptions Disp Refills    ZOLPIDEM 5 MG Oral Tab [Pharmacy Med Name: Zolpidem Tartrate 5 Mg Tab Nort] 30 tablet 0     Sig: Take 1 tablet (5 mg total) by mouth nightly as needed for Sleep.       Controlled Substance Medication Failed - 2/17/2024 10:45 AM        Failed - This medication is a controlled substance - forward to provider to refill           Recent Outpatient Visits              1 month ago Tendinopathy of gluteus medius    [facility] [place] [place]     Office Visit    1 month ago Encounter for annual health examination    [facility] [place] [facility]    Office Visit    2 months ago Tendinopathy of gluteus medius    [facility] [place] [place],     Office Visit    2 months ago Right hip pain    [facility] [place]    Office Visit    3 months ago Pain of right hip    [facility] Navneet Hankins MD    Office Visit          Future Appointments         Provider Department Appt Notes    In 1 month Francesca Gamez MD [facility] 3M FU    In 1 month [facility] 3 [facility] f[facility]u    In 2 months Marcos Ga MD [facility] Hematology Oncology follow up visit.cl  6m

## 2024-02-20 ENCOUNTER — MED REC SCAN ONLY (OUTPATIENT)
Dept: PHYSICAL MEDICINE AND REHAB | Facility: CLINIC | Age: 84
End: 2024-02-20

## 2024-02-20 DIAGNOSIS — M25.551 RIGHT HIP PAIN: ICD-10-CM

## 2024-02-20 DIAGNOSIS — M54.31 SCIATICA OF RIGHT SIDE: ICD-10-CM

## 2024-02-20 RX ORDER — CYCLOBENZAPRINE HCL 10 MG
10 TABLET ORAL NIGHTLY PRN
Qty: 20 TABLET | Refills: 0 | Status: SHIPPED | OUTPATIENT
Start: 2024-02-20

## 2024-02-20 NOTE — TELEPHONE ENCOUNTER
Please review; protocol failed/No Protocol    LOV: 01/04/2024  Requested Prescriptions   Pending Prescriptions Disp Refills    CYCLOBENZAPRINE 10 MG Oral Tab [Pharmacy Med Name: Cyclobenzaprine Hydrochloride 10 Mg Tab Teva] 20 tablet 0     Sig: TAKE ONE TABLET BY MOUTH IN THE EVENING AS NEEDED       There is no refill protocol information for this order        Future Appointments         Provider Department Appt Notes    In 1 month Francesca Gamez MD [facility] 3M FU    In 1 month Michelle Manjarrez, DO [facility] 3 mo f/u    In 2 months Marcos Ga MD [facility] Hematology Oncology follow up visit.cl  6m          Recent Outpatient Visits              1 month ago Tendinopathy of gluteus medius    [facility] Michelle Manjarrez,     Office Visit    1 month ago Encounter for annual health examination    [facility]    Office Visit    2 months ago [facility] gluteus medius    [facility]     [facility] Visit    2 months ago [facility]    Office Visit    3 months ago Pain of right hip    [facility] Navneet Hankins MD    Office Visit

## 2024-02-22 ENCOUNTER — APPOINTMENT (OUTPATIENT)
Dept: GENERAL RADIOLOGY | Age: 84
End: 2024-02-22
Attending: PHYSICIAN ASSISTANT
Payer: MEDICARE

## 2024-02-22 ENCOUNTER — HOSPITAL ENCOUNTER (OUTPATIENT)
Age: 84
Discharge: HOME | End: 2024-02-22
Payer: MEDICARE

## 2024-02-22 VITALS
SYSTOLIC BLOOD PRESSURE: 164 MMHG | HEART RATE: 86 BPM | TEMPERATURE: 98 F | DIASTOLIC BLOOD PRESSURE: 72 MMHG | OXYGEN SATURATION: 96 % | RESPIRATION RATE: 18 BRPM

## 2024-02-22 DIAGNOSIS — T07.XXXA MULTIPLE ABRASIONS: ICD-10-CM

## 2024-02-22 DIAGNOSIS — S61.212A LACERATION OF RIGHT MIDDLE FINGER WITHOUT FOREIGN BODY WITHOUT DAMAGE TO NAIL, INITIAL ENCOUNTER: Primary | ICD-10-CM

## 2024-02-22 DIAGNOSIS — S69.91XA INJURY OF RIGHT HAND, INITIAL ENCOUNTER: ICD-10-CM

## 2024-02-22 DIAGNOSIS — R03.0 ELEVATED BLOOD PRESSURE READING: ICD-10-CM

## 2024-02-22 PROCEDURE — 73130 X-RAY EXAM OF HAND: CPT | Performed by: PHYSICIAN ASSISTANT

## 2024-02-22 RX ORDER — LIDOCAINE HYDROCHLORIDE 10 MG/ML
5 INJECTION, SOLUTION EPIDURAL; INFILTRATION; INTRACAUDAL; PERINEURAL ONCE
Status: COMPLETED | OUTPATIENT
Start: 2024-02-22 | End: 2024-02-22

## 2024-02-23 ENCOUNTER — TELEPHONE (OUTPATIENT)
Dept: INTERNAL MEDICINE CLINIC | Facility: CLINIC | Age: 84
End: 2024-02-23

## 2024-02-23 RX ORDER — ESCITALOPRAM OXALATE 10 MG/1
10 TABLET ORAL DAILY
Qty: 30 TABLET | Refills: 0 | Status: SHIPPED | OUTPATIENT
Start: 2024-02-23

## 2024-02-23 NOTE — TELEPHONE ENCOUNTER
Patient returned call.  Advised 1 month refill was sent.  She declines scheduling an appointment at this time as she just saw Dr. Gamez in January.  Copied from office visit note:    5. Anxiety disorder, unspecified type  Stable on meds

## 2024-02-23 NOTE — TELEPHONE ENCOUNTER
Patient called, verified Name and . States she was prescribed escitalopram 10 mg on 22 since she was under a lot of stress at that time with his son's passing. She tried not to take the medication and just grieved. However, for the past months, she finally gave in and started taking the medication. States it is helping her cope and is feeling better, but only has 2 pills left and is requesting for refill for at least a month.     GARO Ibanez (for Dr. Francesca Gamez) please see pended Rx for review and approval if appropriate.

## 2024-02-23 NOTE — DISCHARGE INSTRUCTIONS
Keep wound clean, dry and covered  Do not submerge wound in water  May take over-the-counter Tylenol for pain  Follow-up with primary care physician or hand specialist in 24-48 hours, and again in 10-14 days for suture removal, call for appointment  Return to immediate care or emergency department for any new or worsening symptoms      You had elevated blood pressure today and you need to follow-up with your doctor for repeat blood pressure check  If possible check your blood pressure at home and keep a blood pressure log to bring to your physician

## 2024-02-23 NOTE — TELEPHONE ENCOUNTER
Can give month supply but needs f/u to assess symptoms and how she is tolerating the medication before any further refills

## 2024-02-23 NOTE — TELEPHONE ENCOUNTER
Left Voicemail to call back our office. Office phone number provided with office telephone hours.        Needs to make an appointment     ( RX sent to [facility] for 30 days)

## 2024-02-23 NOTE — ED PROVIDER NOTES
Patient Seen in: [facility]    History     Chief Complaint   Patient presents with    Laceration/Abrasion     Stated Complaint: CUT ON HAND    HPI    HPI:     83 year old female who presents with chief complaint of laceration of right third digit.  Onset prior to arrival.  Patient states she fell in her driveway while taking out the garbage can and sustained a laceration to her right third digit.  Patient denies other injury, head/neck injury or pain, decreased range of motion, swelling, ecchymosis, erythema, weakness, paraesthesias, purulent drainage, fever, chills.      Past Medical History:   Diagnosis Date    Anxiety     Breast CA (HCC)     Calculus of kidney     Cancer (HCC) 2018    right breast Ca    Colitis     completed antibiotic therapy for C.Diff colitis    Colon polyps     Depression     High cholesterol     Incontinence     Other general symptoms(780.99)     torn rotator cuff left arm    Visual impairment            Past Surgical History:   Procedure Laterality Date    CATARACT      CHOLECYSTECTOMY      2017    COLONOSCOPY  2016    CYSTO/URETERO W/LITHOTRIPSY Right 2019    Right ureteroscopy with laser. Dr. Wiggins @ [facility]    CYSTOSCOPY,INSERT URETERAL STENT      ERCP,DIAGNOSTIC      2017    FRAGMENTING OF KIDNEY STONE Right 2018    Rt. ESWL with pre-stenting, Dr. Wiggins @ [facility]    HYSTERECTOMY      38    LUMPECTOMY RIGHT  2018            Family History   Problem Relation Age of Onset    Cancer Father         throat ca    Heart Disorder Mother     Hypertension Sister     Heart Disorder Brother     Breast Cancer Self         77    Breast Cancer Paternal Aunt         age unknown/poss. late 30s       Social History     Socioeconomic History    Marital status:    Tobacco Use    Smoking status: Former     Types: Cigarettes     Quit date:      Years since quittin.1    Smokeless tobacco: Never   Vaping Use    Vaping Use: Never used   Substance and Sexual  Activity    Alcohol use: Yes     Alcohol/week: 1.0 standard drink of alcohol     Types: 1 Glasses of wine per week     Comment: occasionally    Drug use: No       Review of Systems    Positive for stated complaint: CUT ON HAND  Other systems are as noted in HPI.  Constitutional and vital signs reviewed.      All other systems reviewed and negative except as noted above.    PSFH elements reviewed from today and agreed except as otherwise stated in HPI.    Physical Exam     ED Triage Vitals [02/22/24 1707]   BP (!) 164/72   Pulse 86   Resp 18   Temp 97.7 °F (36.5 °C)   Temp src Temporal   SpO2 96 %   O2 Device None (Room air)       Current:BP (!) 164/72   Pulse 86   Temp 97.7 °F (36.5 °C) (Temporal)   Resp 18   SpO2 96%     PULSE OX within normal limits on room air as interpreted by this provider.    Physical Exam      Constitutional: The patient is cooperative. Appears well-developed and well-nourished.  Mild discomfort.  Head: Normocephalic/atraumatic.  Neck: The neck is supple.  Nontender.  No meningeal signs.  Chest: There is no tenderness to the chest wall.  Respiratory: Respiratory effort was normal.  There is no rales, wheezes, or rhonchi.  There is no stridor.  Air entry is equal.  Cardiovascular: Regular rate and rhythm, no murmurs, gallops, rubs.  Capillary refill is brisk.  No extremity edema.  Gastrointestinal: Abdomen soft, nontender, nondistended.  There is no rebound tenderness or guarding.  No organomegaly is noted. No guarding or peritoneal signs.  Genitourinary: Not examined.  Lymphatic: No gross lymphadenopathy noted.  Musculoskeletal: Right upper extremity-Right upper extremity without acute bony deformity.  A 1.5 cm jagged laceration is present at the dorsal aspect of the right third digit.  Additionally there are 3 superficial abrasions present at the remaining digits. Bleeding controlled.  No erythema, edema, purulent drainage or warmth.   Distal pulses intact.  Capillary refill normal.   Motor intact distally.  Sensory intact distally.  No ecchymosis. Full range of motion of right hand and fingers.  No compartment syndrome.  Remainder of musculoskeletal system is grossly intact.  There is no obvious deformity.  Neurological: Gross motor movement is intact in all 4 extremities.  Patient exhibits normal speech.  Skin: Skin is normal to inspection, except as documented.  Warm and dry.  No obvious bruising.  No obvious rash.    ED Course   Labs Reviewed - No data to display    PROCEDURE:  Wound explored to its base with a gloved finger.  There were no deep structures involved. No tendon injury was identified.  Laceration irrigated with copious amount of normal saline.  Local anesthesia obtained with 2 cc of 1% Lidocaine without epinephrine.  3, 4-0 Nylon sutures placed.  Skin well-approximated.  Bleeding controlled.  Laceration repair was simple.  Sterile dressing applied.  Patient tolerated procedure well without complication.  MDM     Radiology findings: XR HAND (MIN 3 VIEWS), RIGHT (CPT=73130)    Result Date: 2/22/2024   No acute fracture or dislocation.  Osteopenia.  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  Mild-to-moderate degenerative joint disease elsewhere throughout the hand.  No radiopaque foreign body.    elm-remote      Dictated by (CST): Lucio Love MD on 2/22/2024 at 5:50 PM     Finalized by (CST): Lucio Love MD on 2/22/2024 at 5:51 PM           X-ray images of right hand independently viewed by this provider-no acute fracture.    Physical exam remained stable as previously documented.  Available results reviewed with patient.    I have given the patient instructions regarding their diagnoses, expectations, follow up, and ER precautions. I explained to the patient that emergent conditions may arise and to go to the ER for new, worsening or any persistent conditions. I've explained the importance of following up with their doctor as  instructed. The patient verbalized understanding of the discharge instructions and plan.    The patient was informed of their elevated blood pressure reading at immediate care.  They were informed of the dangers of undiagnosed and untreated hypertension.  Education regarding lifestyle modifications and the need for appropriate follow-up with their PCP to have their blood pressure re-checked within 24-48 hours was provided.    Disposition and Plan     Clinical Impression:  1. Laceration of right middle finger without foreign body without damage to nail, initial encounter    2. Injury of right hand, initial encounter    3. Multiple abrasions    4. Elevated blood pressure reading        Disposition:  Discharge    Follow-up:  Francesca Gamez MD  [address] [id number] [phone]    Call in 1 day  For follow-up    Garfield Chacko MD  [address] [id number] [phone]    Call in 1 day  For follow-up, For wound re-check and again in 10-14 days for suture removal      Medications Prescribed:  Current Discharge Medication List        START taking these medications    Details   bacitracin 500 UNIT/GM External Ointment Apply 1 Application topically 2 (two) times daily for 2 days.  Qty: 15 g, Refills: 0

## 2024-02-24 NOTE — TELEPHONE ENCOUNTER
Tyra she had started the medicine on her own but does have a f/u apt in [place] so can keep that apt - Ty

## 2024-02-26 NOTE — TELEPHONE ENCOUNTER
Dr. Gamez - will see you 4/4/24, would you like to send more tablets Escitalopram, to last until 4/4/24 appointment?   Currently 30 tablets sent.     Patient called office. Date of birth and full name both confirmed.   RN informed of provider's message as detailed .   She verbalizes understanding of all information, and agreeable to plan.   Agreeable to keep 4/4/24 appointment. Has used all 90 tablets of an old prescription for Escitalopram.   30 tablets sent to [facility].  RN will request additional tablets if appropriate.   She verbalizes understanding of all information, and agreeable to plan.        Future Appointments   Date Time Provider Department Center   4/4/2024 11:20 AM [facility]   4/8/2024  1:15 PM Michelle Manjarrez, DO [facility]   4/26/2024 10:30 AM Marcos Ga MD [facility] HEM ONC E[facility]

## 2024-02-29 ENCOUNTER — HOSPITAL ENCOUNTER (OUTPATIENT)
Age: 84
Discharge: HOME | End: 2024-02-29
Payer: MEDICARE

## 2024-02-29 VITALS
RESPIRATION RATE: 18 BRPM | TEMPERATURE: 97 F | OXYGEN SATURATION: 100 % | DIASTOLIC BLOOD PRESSURE: 74 MMHG | HEART RATE: 79 BPM | SYSTOLIC BLOOD PRESSURE: 139 MMHG

## 2024-02-29 DIAGNOSIS — T14.8XXA WOUND INFECTION: Primary | ICD-10-CM

## 2024-02-29 DIAGNOSIS — L08.9 WOUND INFECTION: Primary | ICD-10-CM

## 2024-02-29 PROCEDURE — 99213 OFFICE O/P EST LOW 20 MIN: CPT | Performed by: NURSE PRACTITIONER

## 2024-02-29 RX ORDER — CEFADROXIL 500 MG/1
500 CAPSULE ORAL 2 TIMES DAILY
Qty: 14 CAPSULE | Refills: 0 | Status: SHIPPED | OUTPATIENT
Start: 2024-02-29 | End: 2024-03-07

## 2024-02-29 RX ORDER — CEFADROXIL 500 MG/1
500 CAPSULE ORAL 2 TIMES DAILY
Qty: 14 CAPSULE | Refills: 0 | Status: SHIPPED | OUTPATIENT
Start: 2024-02-29 | End: 2024-02-29

## 2024-02-29 NOTE — ED INITIAL ASSESSMENT (HPI)
Pt had stitches placed on the right middle finger.  +swelling from 2 days ago.  No fever.  Pt is not on any oral antibiotics.

## 2024-02-29 NOTE — ED PROVIDER NOTES
Chief Complaint   Patient presents with    Wound Recheck       HPI:     Sona Yan is a 83 year old female who presents today with a chief complaint of right third finger swelling and pain that started yesterday.  Was seen here a week ago, sutures placed, after a fall with injury.  She has had no fever.  No drainage.  Has been cleaning the area with warm water and soap and applying bacitracin ointment.    PFSH      PFSH asessment screens reviewed and agree.  Nurses notes reviewed I agree with documentation.    Family History   Problem Relation Age of Onset    Cancer Father         throat ca    Heart Disorder Mother     Hypertension Sister     Heart Disorder Brother     Breast Cancer Self         77    Breast Cancer Paternal Aunt         age unknown/poss. late 30s     Family history reviewed with patient/caregiver and is not pertinent to presenting problem.  Social History     Socioeconomic History    Marital status:      Spouse name: Not on file    Number of children: Not on file    Years of education: Not on file    Highest education level: Not on file   Occupational History    Not on file   Tobacco Use    Smoking status: Former     Types: Cigarettes     Quit date:      Years since quittin.1    Smokeless tobacco: Never   Vaping Use    Vaping Use: Never used   Substance and Sexual Activity    Alcohol use: Yes     Alcohol/week: 1.0 standard drink of alcohol     Types: 1 Glasses of wine per week     Comment: occasionally    Drug use: No    Sexual activity: Not on file   Other Topics Concern    Not on file   Social History Narrative    Not on file     Social Determinants of Health     Financial Resource Strain: Not on file   Food Insecurity: Not on file   Transportation Needs: Not on file   Physical Activity: Not on file   Stress: Not on file   Social Connections: Not on file   Housing Stability: Not on file         ROS:   Positive for stated complaint: finger pain  All other systems reviewed and  negative except as noted above.  Constitutional and Vital Signs Reviewed.      Physical Exam:     Rash:    There is swelling, erythema, tenderness noted surrounding the suture area.  There is generalized scabbing to the dorsal aspect of the third digit.  Range of motion is appropriate.  Able to flex and extend at the MCP, PCP, and DIP appropriately.    Physical Exam:  /74   Pulse 79   Temp 97.2 °F (36.2 °C) (Temporal)   Resp 18   SpO2 100%   GENERAL: well developed, well nourished, well hydrated, no distress  SKIN: good skin turgor, see above description for rash  EXTREMITIES: no cyanosis or edema. PENA without difficulty.    MDM/Assessment/Plan:   Orders for this encounter:    Orders Placed This Encounter    cefadroxil 500 MG Oral Cap     Sig: Take 1 capsule (500 mg total) by mouth 2 (two) times daily for 7 days.     Dispense:  14 capsule     Refill:  0       Labs performed this visit:  No results found for this or any previous visit (from the past 10 hour(s)).    Procedures: Area cleansed with normal saline, and 50% diluted hydrogen peroxide.  2 sutures removed successfully.  Unable to locate the third suture.  Area dressed with neosporin and clean bandage.    MDM:  Medical Decision Making  Differentials include: Wound infection versus normal healing versus flexor tenosynovitis    HPI and exam consistent with wound infection of 3rd finger on right.  Patient has full range of motion in the finger, low suspicion for flexor tenosynovitis.  2 sutures removed without difficulty.  Unable to locate the third suture, suspect it inadvertently fell out.  The area was cleansed, soaked in hydrogen peroxide, to remove scabbing, still unable to find last suture.  Will start cefadroxil for infection.  Discussed wound care.  Discussed ER precautions.  Advised follow-up with primary care provider in 2 days for recheck.  Patient verbalized understanding and agreeable to plan of care.     Amount and/or Complexity of Data  Reviewed  External Data Reviewed: labs.     Details: 1/4/24: creatinine 0.76    Risk  OTC drugs.        Diagnosis:    ICD-10-CM    1. Wound infection  T14.8XXA     L08.9           All results reviewed and discussed with patient.  See AVS for detailed discharge instructions for your condition today.    Follow Up with:  No follow-up provider specified.

## 2024-02-29 NOTE — DISCHARGE INSTRUCTIONS
Cefadroxil 1 tablet twice per day 7 days  Please take antibiotic as prescribed, finish full course  Wash the area with warm water and soap, apply bacitracin ointment and clean bandage twice per day  For fever, worsening pain, swelling or drainage, or any new/worsening symptoms, please go to the emergency room  Follow up with your primary care provider in 2 days for a re-check

## 2024-03-03 ENCOUNTER — HOSPITAL ENCOUNTER (OUTPATIENT)
Age: 84
Discharge: HOME | End: 2024-03-03
Payer: MEDICARE

## 2024-03-03 VITALS
SYSTOLIC BLOOD PRESSURE: 150 MMHG | DIASTOLIC BLOOD PRESSURE: 79 MMHG | HEART RATE: 94 BPM | RESPIRATION RATE: 18 BRPM | OXYGEN SATURATION: 97 % | TEMPERATURE: 98 F

## 2024-03-03 DIAGNOSIS — Z51.89 VISIT FOR WOUND CHECK: Primary | ICD-10-CM

## 2024-03-03 NOTE — DISCHARGE INSTRUCTIONS
Finish oral antibiotic that you were given at the previous visit stop the topical antibiotic ointment bacitracin as the abrasions need to heal over completely and scab over too much moisture delays the healing.  You may continue good hand hygiene once it starts to scab over do not pull the scabs off they will fall off on their own.  Follow-up with your primary care provider or anyone in the office that can see you for a follow-up immediate care visit in 1 week as discussed.  If you develop severe swelling of the hand of the digits pus or drainage from the skin the skin is red or hot to touch fevers or chills or inability to move the digits go to the nearest emergency department.

## 2024-03-03 NOTE — ED INITIAL ASSESSMENT (HPI)
Patient with right hand fingers abrasion. On abx and Bacitracin. Here to check on the middle finger, she is concerned about the bruising

## 2024-03-03 NOTE — ED PROVIDER NOTES
Patient Seen in: [facility]      History     Chief Complaint   Patient presents with    Wound Recheck     Stated Complaint: hand injury    Subjective:   HPI    This is a 83-year-old female presenting for a wound check.  Patient states that she sustained a right hand injury had stitches put in a few weeks ago followed up here about a week or so ago placed on oral antibiotics and a topical antibiotic ointment.  Patient states she just wants to have her middle finger looked at because there is some swelling and to her it looks purple around the wound.  Denies any new injury or trauma.  Denies any difficulty moving the digits.  Denies decrease in sensation.  Denies any pus or drainage from the skin.  Patient states her son thinks that it just needs to dry up and heal and scab over but she just wanted to make sure that everything was okay so came in for evaluation.    Objective:   No pertinent past medical history.            No pertinent past surgical history.              No pertinent social history.            Review of Systems    Positive for stated complaint: hand injury  Other systems are as noted in HPI.  Constitutional and vital signs reviewed.      All other systems reviewed and negative except as noted above.    Physical Exam     ED Triage Vitals [03/03/24 1428]   /79   Pulse 94   Resp 18   Temp 97.5 °F (36.4 °C)   Temp src Temporal   SpO2 97 %   O2 Device None (Room air)       Current:/79   Pulse 94   Temp 97.5 °F (36.4 °C) (Temporal)   Resp 18   SpO2 97%         Physical Exam  Vitals and nursing note reviewed.   Constitutional:       Appearance: Normal appearance.   HENT:      Right Ear: External ear normal.      Left Ear: External ear normal.      Nose: Nose normal.      Mouth/Throat:      Mouth: Mucous membranes are moist.      Pharynx: Oropharynx is clear.   Eyes:      Conjunctiva/sclera: Conjunctivae normal.   Musculoskeletal:         General: Normal range of motion.         Hands:       Cervical back: Normal range of motion.      Comments: + abrasions multiple digits in healing process, no drainage present no erythema no TTP no warmth normal ROM in all digits sensation intact cap refill less than 2 seconds radial pulses 2+     Skin:     General: Skin is warm and dry.      Capillary Refill: Capillary refill takes less than 2 seconds.   Neurological:      General: No focal deficit present.      Mental Status: She is alert and oriented to person, place, and time.             ED Course   Labs Reviewed - No data to display                   MDM          Medical Decision Making  83-year-old female well-appearing and nontoxic presenting for wound check.  HPI and PE as document above.  Concern for cellulitis versus abrasions and healing process versus osteomyelitis.  Physical exam no signs of infection concerned that patient may need to stop bacitracin as this may be delaying the healing process as the wounds need to dry out so they continue to heal.  Discussed this with the patient stopping the bacitracin she may finish the oral antibiotic that she was prescribed.  Discussed follow-up for another wound check with her PCP or one of the providers in office in 1 week.  Discussed signs symptoms of infection and ER precautions.  Patient feels comfortable with this plan of care and acknowledges understanding discharge instructions.    Risk  OTC drugs.        Disposition and Plan     Clinical Impression:  1. Visit for wound check         Disposition:  Discharge  3/3/2024  2:39 pm    Follow-up:  Francesca Gamez MD  [address] [place] [id number]  [phone]    In 1 week  For wound re-check          Medications Prescribed:  Current Discharge Medication List

## 2024-03-09 DIAGNOSIS — M25.551 RIGHT HIP PAIN: ICD-10-CM

## 2024-03-09 DIAGNOSIS — M54.31 SCIATICA OF RIGHT SIDE: ICD-10-CM

## 2024-03-10 RX ORDER — CYCLOBENZAPRINE HCL 10 MG
10 TABLET ORAL NIGHTLY PRN
Qty: 20 TABLET | Refills: 0 | Status: SHIPPED | OUTPATIENT
Start: 2024-03-10

## 2024-03-10 NOTE — TELEPHONE ENCOUNTER
Protocol Failed/ No Protocol    Requested Prescriptions   Pending Prescriptions Disp Refills    CYCLOBENZAPRINE 10 MG Oral Tab [Pharmacy Med Name: Cyclobenzaprine Hydrochloride 10 Mg Tab Teva] 20 tablet 0     Sig: Take 1 tablet (10 mg total) by mouth nightly as needed.       There is no refill protocol information for this order          Future Appointments         Provider Department Appt Notes    In 3 weeks Francesca Gamez MD [facility] 3M FU    In 1 month Michelle Manjarrez, DO [facility] 3 mo f/u    In 1 month Marcos Ga MD [facility] Hematology Oncology follow up visit.cl  6m          Recent Outpatient Visits              2 months ago Tendinopathy of gluteus medius    [facility] Michelle Manjarrez,     Office Visit    2 months ago Encounter for annual health examination    [facility] Francesca Gamez MD    Office Visit    3 months ago Tendinopathy of gluteus medius    [facility] Michelle Manjarrez,     Office Visit    3 months ago [facility]    Office Visit    4 months ago Pain of right hip    [facility] Navneet Hankins MD    Office Visit

## 2024-03-25 DIAGNOSIS — F51.01 PRIMARY INSOMNIA: ICD-10-CM

## 2024-03-25 DIAGNOSIS — M54.31 SCIATICA OF RIGHT SIDE: ICD-10-CM

## 2024-03-25 DIAGNOSIS — M25.551 RIGHT HIP PAIN: ICD-10-CM

## 2024-03-25 RX ORDER — ATORVASTATIN CALCIUM 20 MG/1
20 TABLET, FILM COATED ORAL DAILY
Qty: 90 TABLET | Refills: 3 | Status: SHIPPED | OUTPATIENT
Start: 2024-03-25

## 2024-03-25 NOTE — TELEPHONE ENCOUNTER
Pt needs a new script for Rx:     Quantity Refills Start End   atorvastatin 20 MG Oral Tab           Send to:  [facility] on [address] in [place]    She is out of medication

## 2024-03-25 NOTE — TELEPHONE ENCOUNTER
Refill passed per [facility] protocol.  Requested Prescriptions   Pending Prescriptions Disp Refills    atorvastatin 20 MG Oral Tab 90 tablet 1     Sig: Take 1 tablet (20 mg total) by mouth daily.       Cholesterol Medication Protocol Passed - 3/25/2024  1:44 PM        Passed - ALT < 80     Lab Results   Component Value Date    ALT 19 01/04/2024             Passed - ALT resulted within past year        Passed - Lipid panel within past 12 months     Lab Results   Component Value Date    CHOLEST 247 (H) 01/04/2024    TRIG 122 01/04/2024    HDL 63 (H) 01/04/2024     (H) 01/04/2024    VLDL 24 01/04/2024    NONHDLC 184 (H) 01/04/2024             Passed - In person appointment or virtual visit in the past 12 mos or appointment in next 3 mos     Recent Outpatient Visits              2 months ago Tendinopathy of gluteus medius    [facility] Michelle Manjarrez, DO    Office Visit    2 months ago Encounter for annual health examination    [facility] Francesca Gamez MD    Office Visit    3 months ago [facility] gl[facility]us med[facility]    [facility]    3 months [facility]    Office Visit    4 months ago Pain of right hip    [facility] Navneet Hankins MD    Office Visit          Future Appointments         Provider Department Appt Notes    In 1 week Francesca Gamez MD [facility] 3M FU    In 1 month Marcos Ga MD [facility] Hematology Oncology follow up visit.cl  6m                  Recent Outpatient Visits              2 months [facility]    Office Visit    2 months ago  Encounter for annual health examination    [facility] Francesca Gamez MD    Office Visit    3 months ago Tendinopathy of gluteus medius    [facility] Michelle Manjarrez DO    Office Visit    3 months ago Right hip pain    [facility] Francesca Gamez MD    Office Visit    4 months ago Pain of right hip    [facility] Navneet Hankins MD    Office Visit          Future Appointments         Provider Department Appt Notes    In 1 week Francesca Gamez MD [facility] 3M FU    In 1 month Marcos Ga MD [facility] Hematology Oncology follow up visit.cl  6m

## 2024-03-27 RX ORDER — CYCLOBENZAPRINE HCL 10 MG
10 TABLET ORAL NIGHTLY PRN
Qty: 20 TABLET | Refills: 0 | Status: SHIPPED | OUTPATIENT
Start: 2024-03-27

## 2024-03-27 RX ORDER — ZOLPIDEM TARTRATE 5 MG/1
5 TABLET ORAL NIGHTLY PRN
Qty: 30 TABLET | Refills: 0 | Status: SHIPPED | OUTPATIENT
Start: 2024-03-27

## 2024-03-27 NOTE — TELEPHONE ENCOUNTER
Protocol Failed/ No Protocol    Requested Prescriptions   Pending Prescriptions Disp Refills    ZOLPIDEM 5 MG Oral Tab [Pharmacy Med Name: Zolpidem Tartrate 5 Mg Tab Nort] 30 tablet 0     Sig: Take 1 tablet (5 mg total) by mouth nightly as needed for Sleep.       Controlled Substance Medication Failed - 3/25/2024  1:26 PM        Failed - This medication is a controlled substance - forward to provider to refill          CYCLOBENZAPRINE 10 MG Oral Tab [Pharmacy Med Name: Cyclobenzaprine Hydrochloride 10 Mg Tab Teva] 20 tablet 0     Sig: Take 1 tablet (10 mg total) by mouth nightly as needed.       There is no refill protocol information for this order          Future Appointments         Provider Department Appt Notes    In 1 week Francesca Gamez MD [facility] 3M FU    In 1 month Marcos Ga MD [facility] Hematology Oncology follow up visit.cl  6m          Recent Outpatient Visits              2 months ago Tendinopathy of gluteus medius    [facility] Michelle Manjarrez,     Office Visit    2 months ago [facility] health [facility]    Office Visit    3 months ago [facility]endin[facility] gluteus medius    [facility]     [facility]    3 months [facility]    Office Visit    4 months ago Pain of right hip    [facility] Navneet Hankins MD    Office Visit

## 2024-04-15 ENCOUNTER — NURSE TRIAGE (OUTPATIENT)
Dept: INTERNAL MEDICINE CLINIC | Facility: CLINIC | Age: 84
End: 2024-04-15

## 2024-04-15 DIAGNOSIS — R21 RASH: ICD-10-CM

## 2024-04-15 RX ORDER — BETAMETHASONE DIPROPIONATE 0.5 MG/G
1 CREAM TOPICAL 2 TIMES DAILY
COMMUNITY
Start: 2024-04-15

## 2024-04-15 NOTE — TELEPHONE ENCOUNTER
Action Requested: Summary for Provider     []  Critical Lab, Recommendations Needed  [] Need Additional Advice  []   FYI    []   Need Orders  [] Need Medications Sent to Pharmacy  []  Other     SUMMARY: Patient scheduled for an office appointment Wednesday 4/17/24 with GARO Le.     Patient states she has a thick rash on bridge of nose, looked it up on the internet and thinks she may have lupus.Rash only on bridge of nose, there for 1 week. No itching, no rash on cheeks. Patient looked real close in the mirror and sees a butterfly on her nose.     Patient states she is 83 so yes she gets tired, but does not have hair loss, mouth sores or any other symptoms.     Patient also states she has been using a betamethasone cream to spots that look like bug bites on her arm, prescribed at last visit. Rash is still there.         Reason for call: Rash  Onset: 1 week    Reason for Disposition   Patient wants to be seen    Protocols used: Rash or Redness - [id number]

## 2024-04-17 ENCOUNTER — OFFICE VISIT (OUTPATIENT)
Dept: INTERNAL MEDICINE CLINIC | Facility: CLINIC | Age: 84
End: 2024-04-17

## 2024-04-17 VITALS
WEIGHT: 122 LBS | HEART RATE: 82 BPM | DIASTOLIC BLOOD PRESSURE: 74 MMHG | HEIGHT: 63 IN | BODY MASS INDEX: 21.62 KG/M2 | SYSTOLIC BLOOD PRESSURE: 137 MMHG

## 2024-04-17 DIAGNOSIS — Z12.83 SCREENING FOR SKIN CANCER: Primary | ICD-10-CM

## 2024-04-17 DIAGNOSIS — L98.9 SKIN ABNORMALITY: ICD-10-CM

## 2024-04-17 PROCEDURE — 99213 OFFICE O/P EST LOW 20 MIN: CPT | Performed by: NURSE PRACTITIONER

## 2024-04-17 NOTE — PROGRESS NOTES
HPI:    Patient ID: Sona Yan is a 83 year old female.    HPI Rash on nose for 1 month  83 year old female who I am meeting for the first time.  She has had it for 1 month.  Does not itch and has no pain.  She would like to put betamethasone on her nose to see if it improves.          Immunization History   Administered Date(s) Administered    Covid-19 Vaccine Pfizer 30 mcg/0.3 ml 2021, 2021, 10/27/2021    Flucelvax 0.5 Ml Quad PFS Single Dose 2020    HIGH DOSE FLU 65 YRS AND OLDER PRSV FREE SINGLE D (03466) FLU CLINIC 2016    Pneumococcal (Prevnar 13) 2016    TDAP 2024       Past Medical History:    Anxiety    Breast CA (HCC)    Calculus of kidney    Cancer (HCC)    right breast Ca    Colitis    completed antibiotic therapy for C.Diff colitis    Colon polyps    Depression    High cholesterol    Incontinence    Other general symptoms(780.99)    torn rotator cuff left arm    Visual impairment      Past Surgical History:   Procedure Laterality Date    Cataract      Cholecystectomy      2017    Colonoscopy  2016    Cysto/uretero w/lithotripsy Right 2019    Right ureteroscopy with laser. Dr. Wiggins @ [facility]    Cystoscopy,insert ureteral stent      Ercp,diagnostic      2017    Fragmenting of kidney stone Right 2018    Rt. ESWL with pre-stenting, Dr. Wiggins @ [facility]    Hysterectomy      38    Lumpectomy right  2018      Social History     Socioeconomic History    Marital status:    Tobacco Use    Smoking status: Former     Current packs/day: 0.00     Types: Cigarettes     Quit date:      Years since quittin.3    Smokeless tobacco: Never   Vaping Use    Vaping status: Never Used   Substance and Sexual Activity    Alcohol use: Yes     Alcohol/week: 1.0 standard drink of alcohol     Types: 1 Glasses of wine per week     Comment: occasionally    Drug use: No          Review of Systems   Constitutional:  Negative for chills, fatigue and fever.    HENT:  Negative for congestion, ear discharge, ear pain, facial swelling, hearing loss, postnasal drip, sinus pressure, sore throat and trouble swallowing.    Eyes:  Negative for pain, discharge, redness and visual disturbance.   Respiratory:  Negative for cough, chest tightness, shortness of breath and wheezing.    Cardiovascular:  Negative for chest pain, palpitations and leg swelling.   Gastrointestinal:  Negative for abdominal distention, abdominal pain, constipation, diarrhea, nausea and vomiting.   Endocrine: Negative for cold intolerance, heat intolerance, polydipsia, polyphagia and polyuria.   Genitourinary:  Negative for difficulty urinating, dysuria, pelvic pain and vaginal bleeding.   Musculoskeletal:  Negative for back pain, gait problem, neck pain and neck stiffness.   Skin:  Positive for color change (Skin color change in bridge of nose) and rash.   Neurological:  Negative for dizziness, seizures, weakness and headaches.   Psychiatric/Behavioral:  Negative for agitation and sleep disturbance. The patient is not nervous/anxious.               Current Outpatient Medications   Medication Sig Dispense Refill    betamethasone dipropionate 0.05 % External Cream Apply 1 Film topically 2 (two) times daily.      clonazePAM 0.5 MG Oral Tab Take 1 tablet (0.5 mg total) by mouth daily as needed for Anxiety. 30 tablet 0    zolpidem 5 MG Oral Tab Take 1 tablet (5 mg total) by mouth nightly as needed for Sleep. 30 tablet 0    cyclobenzaprine 10 MG Oral Tab Take 1 tablet (10 mg total) by mouth nightly as needed. 20 tablet 0    atorvastatin 20 MG Oral Tab Take 1 tablet (20 mg total) by mouth daily. 90 tablet 3    LETROZOLE 2.5 MG Oral Tab TAKE 1 TABLET(2.5 MG) BY MOUTH DAILY 90 tablet 1    Multiple Vitamins-Minerals (WOMENS 50+ MULTI VITAMIN/MIN) Oral Tab Take by mouth.       Allergies:  Allergies   Allergen Reactions    Morphine OTHER (SEE COMMENTS)     Pt states she had a mild seizure when Morphine was administered  too fast IM      PHYSICAL EXAM:   Physical Exam  Constitutional:       Appearance: Normal appearance. She is well-developed.   HENT:      Head: Normocephalic.   Cardiovascular:      Rate and Rhythm: Normal rate and regular rhythm.      Heart sounds: Normal heart sounds. No murmur heard.     No friction rub. No gallop.   Pulmonary:      Effort: Pulmonary effort is normal. No respiratory distress.      Breath sounds: Normal breath sounds. No wheezing, rhonchi or rales.   Abdominal:      General: Bowel sounds are normal. There is no distension.      Palpations: Abdomen is soft. There is no mass.      Tenderness: There is no abdominal tenderness. There is no right CVA tenderness, left CVA tenderness or guarding.   Musculoskeletal:         General: No tenderness.      Cervical back: Normal range of motion and neck supple. No tenderness.      Right lower leg: No edema.      Left lower leg: No edema.   Lymphadenopathy:      Cervical: No cervical adenopathy.   Skin:     General: Skin is warm and dry.      Findings: No rash.   Neurological:      Mental Status: She is alert and oriented to person, place, and time.      Coordination: Coordination normal.      Gait: Gait normal.   Psychiatric:         Mood and Affect: Mood normal.         Behavior: Behavior normal.         Thought Content: Thought content normal.         Judgment: Judgment normal.       /74 (BP Location: Left arm, Patient Position: Sitting, Cuff Size: adult)   Pulse 82   Ht 5' 3\" (1.6 m)   Wt 122 lb (55.3 kg)   BMI 21.61 kg/m²   Wt Readings from Last 2 Encounters:   04/17/24 122 lb (55.3 kg)   04/04/24 125 lb 6.4 oz (56.9 kg)     Body mass index is 21.61 kg/m².(2)  Lab Results   Component Value Date    WBC 7.6 10/22/2023    RBC 4.33 10/22/2023    HGB 12.1 10/22/2023    HCT 39.2 10/22/2023    MCV 90.5 10/22/2023    MCH 27.9 10/22/2023    MCHC 30.9 (L) 10/22/2023    RDW 12.7 10/22/2023    .0 10/22/2023    MPV 8.1 12/21/2018      Lab Results    Component Value Date    GLU 90 01/04/2024    BUN 21 01/04/2024    BUNCREA 27.6 (H) 01/04/2024    CREATSERUM 0.76 01/04/2024    ANIONGAP 4 01/04/2024    GFRNAA 71 04/14/2022    GFRAA 81 04/14/2022    CA 10.1 01/04/2024    OSMOCALC 291 01/04/2024    ALKPHO 77 01/04/2024    AST 25 01/04/2024    ALT 19 01/04/2024    BILT 0.4 01/04/2024    TP 7.4 01/04/2024    ALB 4.7 01/04/2024    GLOBULIN 2.7 (L) 01/04/2024     01/04/2024    K 4.2 01/04/2024     01/04/2024    CO2 28.0 01/04/2024      No results found for: \"EAG\", \"A1C\"   Lab Results   Component Value Date    CHOLEST 247 (H) 01/04/2024    TRIG 122 01/04/2024    HDL 63 (H) 01/04/2024     (H) 01/04/2024    VLDL 24 01/04/2024    NONHDLC 184 (H) 01/04/2024      Lab Results   Component Value Date    TSH 1.570 01/04/2024                ASSESSMENT/PLAN:     Problem List Items Addressed This Visit       Skin abnormality     Color change on nose for 1 month    Plan  betamethasone dipropionate 0.05 % External Cream Apply 1 Film topically 2 (two) times daily.     For two weeks  Skin exam by dermatologist             Other Visit Diagnoses       Screening for skin cancer    -  Primary    Relevant Orders    Derm Referral - In Network               No orders of the defined types were placed in this encounter.      Meds This Visit:  Requested Prescriptions      No prescriptions requested or ordered in this encounter       Imaging & Referrals:  DERM - INTERNAL         GARO Morel

## 2024-04-17 NOTE — ASSESSMENT & PLAN NOTE
Color change on nose for 1 month    Plan  betamethasone dipropionate 0.05 % External Cream Apply 1 Film topically 2 (two) times daily.     For two weeks  Skin exam by dermatologist

## 2024-04-19 ENCOUNTER — TELEPHONE (OUTPATIENT)
Dept: INTERNAL MEDICINE CLINIC | Facility: CLINIC | Age: 84
End: 2024-04-19

## 2024-04-19 NOTE — TELEPHONE ENCOUNTER
Patient contacted and notified.  She continues to decline appointment with NP.  Advised she contact dermatology.  She reports she has an appointment for 05/02 but does not want to wait that long.  Acute visit booked today with Dr. Adams.

## 2024-04-19 NOTE — TELEPHONE ENCOUNTER
Patient calling to follow up on rash. She is asking if there is a dermatology provider that can see her today. Advised derm referral given at last visit 4/17/24. She is using cream, area is more red and she is using cream as directed but she wants to know what this is and will call derm to see about visit time.     Gave number to Derm and transferred to that dept per patient request.     Advised to call back if she feels there is something more we can do to help with this. Rash a bit more red, not painful. Because it's on the face she is anxious about this and wants to know what is is.

## 2024-04-19 NOTE — TELEPHONE ENCOUNTER
Patient calling again  (  name and  of patient verified )  she wants to see Dr. Gamez   ( was not happy with visit with Cindi )     Patient is crying, very scared, area looks worse than 2 days ago   ( See note below)     Patient begging to be seen today ONLY by Dr. Franco Gamez --  can the patient be added on today     Please advise and thank you.    Best call back number:   at [phone]

## 2024-04-19 NOTE — TELEPHONE ENCOUNTER
Communicated with Dr. Gamez she is not available today  ( very overbooked )   Advised to have patient see GARO Dimas     Attempted to call patient , line is busy

## 2024-04-19 NOTE — TELEPHONE ENCOUNTER
Noted.       Future Appointments   Date Time Provider Department Center   4/19/2024  3:00 PM [facility]   4/26/2024 10:30 AM Marcos Ga MD [facility] HEM ONC [facility]   5/2/2024  2:30 PM Jared Ryan MD [facility] [place]   5/6/2024 10:15 AM Jared Ryan MD [facility] [place]

## 2024-04-22 DIAGNOSIS — F41.9 ANXIETY DISORDER, UNSPECIFIED TYPE: ICD-10-CM

## 2024-04-22 RX ORDER — CLONAZEPAM 0.5 MG/1
0.5 TABLET ORAL DAILY PRN
Qty: 270 TABLET | Refills: 0 | OUTPATIENT
Start: 2024-04-22

## 2024-04-22 RX ORDER — CLONAZEPAM 0.5 MG/1
0.5 TABLET ORAL 3 TIMES DAILY PRN
Qty: 30 TABLET | Refills: 0 | Status: SHIPPED | OUTPATIENT
Start: 2024-04-22

## 2024-04-22 NOTE — TELEPHONE ENCOUNTER
Instead of giving her the whole 270 tablets is there way that we can give her what is needed to go from twice daily to 3 times daily?  For the month of April

## 2024-04-22 NOTE — TELEPHONE ENCOUNTER
Asking for quantity to be change back to 270 (Clonazepam).    Stated issues has gotten worse and needs medication now more than ever    Asking for refill to [facility], did just  April script    Patient is will to do Tele-person visit with PCP if needed, not available today    Asking for RN to call back once this has been completed    Pend for review/approval

## 2024-04-22 NOTE — TELEPHONE ENCOUNTER
Dr. Gamez     Would like to prescribe  Clonazepam TID dosing for the remainder of the month of April ?    ( Next Wednesday is May 1st)     Medication pended for your review / approval for 10 day fill

## 2024-04-22 NOTE — TELEPHONE ENCOUNTER
Pt needs to follow up with JUAN - this was explained to her before but especially now she should reconsider since she is increasing the dose/frequency--please let her know

## 2024-04-23 DIAGNOSIS — M25.551 RIGHT HIP PAIN: ICD-10-CM

## 2024-04-23 DIAGNOSIS — M54.31 SCIATICA OF RIGHT SIDE: ICD-10-CM

## 2024-04-23 NOTE — TELEPHONE ENCOUNTER
Please review. Rx failed/no protocol.    [facility] [phone] is requesting a refill for the patient's cyclobenzaprine medication. Patient is out of medication.      Requested Prescriptions   Pending Prescriptions Disp Refills    cyclobenzaprine 10 MG Oral Tab 20 tablet 0     Sig: Take 1 tablet (10 mg total) by mouth nightly as needed.       There is no refill protocol information for this order          Future Appointments         Provider Department Appt Notes    In 3 days Marcos Ga MD [facility] Hematology Oncology follow up visit.cl  6m    In 3 weeks Yvonne Gabriel MD [facility] possible clogged tear duct          Recent Outpatient Visits              6 days ago Screening for skin cancer    [facility] Cindi Le APRN    Office Visit    2 weeks ago [facility] disorder, [facility] type    [facility]    Office Visit    3 months ago [facility]us [facility]    [facility]    3 months ago Encounter for annual health examination    [facility]    Office Visit    4 months ago Tendinopathy of gluteus medius    [facility]    [facility] Visit

## 2024-04-23 NOTE — TELEPHONE ENCOUNTER
[facility] [phone] is requesting a refill for the patient's cyclobenzaprine medication. Patient is out of medication. Pharmacy: [facility]/MONIKA Jaeger (listed)     Current Outpatient Medications    cyclobenzaprine 10 MG Oral Tab Take 1 tablet (10 mg total) by mouth nightly as needed. 20 tablet 0

## 2024-04-24 ENCOUNTER — LAB ENCOUNTER (OUTPATIENT)
Dept: LAB | Facility: HOSPITAL | Age: 84
End: 2024-04-24
Attending: INTERNAL MEDICINE
Payer: MEDICARE

## 2024-04-24 DIAGNOSIS — D64.9 ANEMIA, UNSPECIFIED TYPE: ICD-10-CM

## 2024-04-24 DIAGNOSIS — Z17.0 MALIGNANT NEOPLASM OF RIGHT BREAST IN FEMALE, ESTROGEN RECEPTOR POSITIVE, UNSPECIFIED SITE OF BREAST (HCC): ICD-10-CM

## 2024-04-24 DIAGNOSIS — F51.01 PRIMARY INSOMNIA: ICD-10-CM

## 2024-04-24 DIAGNOSIS — D47.2 MONOCLONAL GAMMOPATHY: ICD-10-CM

## 2024-04-24 DIAGNOSIS — C50.911 MALIGNANT NEOPLASM OF RIGHT BREAST IN FEMALE, ESTROGEN RECEPTOR POSITIVE, UNSPECIFIED SITE OF BREAST (HCC): ICD-10-CM

## 2024-04-24 LAB
ALBUMIN SERPL-MCNC: 4.7 G/DL (ref 3.2–4.8)
ALBUMIN/GLOB SERPL: 1.8 {RATIO} (ref 1–2)
ALP LIVER SERPL-CCNC: 100 U/L
ALT SERPL-CCNC: 17 U/L
ANION GAP SERPL CALC-SCNC: 3 MMOL/L (ref 0–18)
AST SERPL-CCNC: 30 U/L (ref ?–34)
BASOPHILS # BLD AUTO: 0.02 X10(3) UL (ref 0–0.2)
BASOPHILS NFR BLD AUTO: 0.3 %
BILIRUB SERPL-MCNC: 0.6 MG/DL (ref 0.2–1.1)
BUN BLD-MCNC: 23 MG/DL (ref 9–23)
BUN/CREAT SERPL: 30.3 (ref 10–20)
CALCIUM BLD-MCNC: 10.2 MG/DL (ref 8.7–10.4)
CHLORIDE SERPL-SCNC: 108 MMOL/L (ref 98–112)
CO2 SERPL-SCNC: 29 MMOL/L (ref 21–32)
CREAT BLD-MCNC: 0.76 MG/DL
DEPRECATED RDW RBC AUTO: 40.6 FL (ref 35.1–46.3)
EGFRCR SERPLBLD CKD-EPI 2021: 77 ML/MIN/1.73M2 (ref 60–?)
EOSINOPHIL # BLD AUTO: 0.04 X10(3) UL (ref 0–0.7)
EOSINOPHIL NFR BLD AUTO: 0.6 %
ERYTHROCYTE [DISTWIDTH] IN BLOOD BY AUTOMATED COUNT: 12.5 % (ref 11–15)
FASTING STATUS PATIENT QL REPORTED: YES
GLOBULIN PLAS-MCNC: 2.6 G/DL (ref 2.8–4.4)
GLUCOSE BLD-MCNC: 100 MG/DL (ref 70–99)
HCT VFR BLD AUTO: 36.7 %
HGB BLD-MCNC: 12 G/DL
IMM GRANULOCYTES # BLD AUTO: 0.02 X10(3) UL (ref 0–1)
IMM GRANULOCYTES NFR BLD: 0.3 %
LYMPHOCYTES # BLD AUTO: 2.36 X10(3) UL (ref 1–4)
LYMPHOCYTES NFR BLD AUTO: 34.3 %
MCH RBC QN AUTO: 28.8 PG (ref 26–34)
MCHC RBC AUTO-ENTMCNC: 32.7 G/DL (ref 31–37)
MCV RBC AUTO: 88.2 FL
MONOCYTES # BLD AUTO: 1.06 X10(3) UL (ref 0.1–1)
MONOCYTES NFR BLD AUTO: 15.4 %
NEUTROPHILS # BLD AUTO: 3.39 X10 (3) UL (ref 1.5–7.7)
NEUTROPHILS # BLD AUTO: 3.39 X10(3) UL (ref 1.5–7.7)
NEUTROPHILS NFR BLD AUTO: 49.1 %
OSMOLALITY SERPL CALC.SUM OF ELEC: 294 MOSM/KG (ref 275–295)
PLATELET # BLD AUTO: 181 10(3)UL (ref 150–450)
POTASSIUM SERPL-SCNC: 4.9 MMOL/L (ref 3.5–5.1)
PROT SERPL-MCNC: 7.3 G/DL (ref 5.7–8.2)
RBC # BLD AUTO: 4.16 X10(6)UL
SODIUM SERPL-SCNC: 140 MMOL/L (ref 136–145)
WBC # BLD AUTO: 6.9 X10(3) UL (ref 4–11)

## 2024-04-24 PROCEDURE — 85025 COMPLETE CBC W/AUTO DIFF WBC: CPT

## 2024-04-24 PROCEDURE — 84165 PROTEIN E-PHORESIS SERUM: CPT

## 2024-04-24 PROCEDURE — 86334 IMMUNOFIX E-PHORESIS SERUM: CPT

## 2024-04-24 PROCEDURE — 36415 COLL VENOUS BLD VENIPUNCTURE: CPT

## 2024-04-24 PROCEDURE — 80053 COMPREHEN METABOLIC PANEL: CPT

## 2024-04-24 PROCEDURE — 83521 IG LIGHT CHAINS FREE EACH: CPT

## 2024-04-24 RX ORDER — ZOLPIDEM TARTRATE 5 MG/1
5 TABLET ORAL NIGHTLY PRN
Qty: 30 TABLET | Refills: 0 | Status: SHIPPED | OUTPATIENT
Start: 2024-04-24

## 2024-04-24 RX ORDER — CYCLOBENZAPRINE HCL 10 MG
10 TABLET ORAL NIGHTLY PRN
Qty: 20 TABLET | Refills: 0 | Status: SHIPPED | OUTPATIENT
Start: 2024-04-24

## 2024-04-24 NOTE — TELEPHONE ENCOUNTER
Current Outpatient Medications:       zolpidem 5 MG Oral Tab, Take 1 tablet (5 mg total) by mouth nightly as needed for Sleep., Disp: 30 tablet, Rfl: 0

## 2024-04-25 LAB
ALBUMIN SERPL ELPH-MCNC: 4.25 G/DL (ref 3.75–5.21)
ALBUMIN/GLOB SERPL: 1.6 {RATIO} (ref 1–2)
ALPHA1 GLOB SERPL ELPH-MCNC: 0.28 G/DL (ref 0.19–0.46)
ALPHA2 GLOB SERPL ELPH-MCNC: 0.79 G/DL (ref 0.48–1.05)
B-GLOBULIN SERPL ELPH-MCNC: 0.72 G/DL (ref 0.68–1.23)
GAMMA GLOB SERPL ELPH-MCNC: 0.85 G/DL (ref 0.62–1.7)
KAPPA LC FREE SER-MCNC: 4.11 MG/DL (ref 0.33–1.94)
KAPPA LC FREE/LAMBDA FREE SER NEPH: 3.23 {RATIO} (ref 0.26–1.65)
LAMBDA LC FREE SERPL-MCNC: 1.27 MG/DL (ref 0.57–2.63)
M PROTEIN 1 SERPL ELPH-MCNC: 0.27 G/DL (ref ?–0)
PROT SERPL-MCNC: 6.9 G/DL (ref 5.7–8.2)

## 2024-04-25 NOTE — TELEPHONE ENCOUNTER
Protocol Failed/ No Protocol    Requested Prescriptions   Pending Prescriptions Disp Refills    zolpidem 5 MG Oral Tab 30 tablet 0     Sig: Take 1 tablet (5 mg total) by mouth nightly as needed for Sleep.       Controlled Substance Medication Failed - 4/24/2024  2:00 PM        Failed - This medication is a controlled substance - forward to provider to refill             Future Appointments         Provider Department Appt Notes    In 2 days Marcos Ga MD [facility] Hematology Oncology follow up visit.cl  6m    In 3 weeks Yvonne Gabriel MD [facility] possible clogged tear duct          Recent Outpatient Visits              1 week ago Screening for skin cancer    [facility] Cindi Le APRN    Office Visit    2 weeks ago [facility], [facility]    Office Visit    3 months ago [facility] gluteus medius    [facility]    Office Visit    3 months ago [facility] health [facility]    [facility] Visit    4 months ago [facility]endin[facility] of gluteus medius    [facility]    Office Visit

## 2024-04-26 ENCOUNTER — OFFICE VISIT (OUTPATIENT)
Dept: HEMATOLOGY/ONCOLOGY | Facility: HOSPITAL | Age: 84
End: 2024-04-26
Attending: INTERNAL MEDICINE
Payer: MEDICARE

## 2024-04-26 VITALS
HEIGHT: 63 IN | TEMPERATURE: 98 F | BODY MASS INDEX: 21.83 KG/M2 | SYSTOLIC BLOOD PRESSURE: 154 MMHG | WEIGHT: 123.19 LBS | RESPIRATION RATE: 16 BRPM | OXYGEN SATURATION: 99 % | DIASTOLIC BLOOD PRESSURE: 73 MMHG | HEART RATE: 84 BPM

## 2024-04-26 DIAGNOSIS — D47.2 MONOCLONAL GAMMOPATHY: Primary | ICD-10-CM

## 2024-04-26 DIAGNOSIS — D64.9 ANEMIA, UNSPECIFIED TYPE: ICD-10-CM

## 2024-04-26 PROCEDURE — 99214 OFFICE O/P EST MOD 30 MIN: CPT | Performed by: INTERNAL MEDICINE

## 2024-04-26 NOTE — PROGRESS NOTES
Cancer Center Progress Note    Patient Name: Sona Yan   YOB: 1940   Medical Record Number: [id number]   Attending Physician: Marcos Ga M.D.     Chief Complaint:  hormone receptor positive right breast cancer    History of Present Illness:  Cancer history:  83 year old   post menopausal female status post right partial mastectomy 8/13/2018 for a stage I (pT1C) hormone receptor positive HER-2 negative right breast cancer. Her surgeon was Dr. Eason.  Ki 67% was 10%.     Based on age and favorable pathologic features it was not recommended that she receive adjuvant chemotherapy or radiation.  Her case was discussed at breast tumor board.    She was placed on adjuvant anastrozole which she has tolerated well    She also has a history of IgG and IgM kappa gammopathy She has not any endorgan dysfunction    Interval history:  Returns for follow-up   She denies any fevers or chills.  She denies any breast masses.  She denies any adenopathy.    Performance Status:  ECOG 0  Past Medical History:  Past Medical History:    Anxiety    Breast CA (HCC)    Calculus of kidney    Cancer (HCC)    right breast Ca    Colitis    completed antibiotic therapy for C.Diff colitis    Colon polyps    Depression    High cholesterol    Incontinence    Other general symptoms(780.99)    torn rotator cuff left arm    Visual impairment       Past Surgical History:  Past Surgical History:   Procedure Laterality Date    Cataract      Cholecystectomy      6/30/2017    Colonoscopy  2016    Cysto/uretero w/lithotripsy Right 01/04/2019    Right ureteroscopy with laser. Dr. Wiggins @ [facility]    Cystoscopy,insert ureteral stent      Ercp,diagnostic      5/21/2017    Fragmenting of kidney stone Right 11/16/2018    Rt. ESWL with pre-stenting, Dr. Wiggins @ [facility]    Hysterectomy      38    Lumpectomy right  08/2018       Family History:  Family History   Problem Relation Age of Onset    Cancer Father         throat ca    Heart Disorder Mother      Hypertension Sister     Heart Disorder Brother     Breast Cancer Self         77    Breast Cancer Paternal Aunt         age unknown/poss. late 30s       Social History:  Social History     Socioeconomic History    Marital status:      Spouse name: Not on file    Number of children: Not on file    Years of education: Not on file    Highest education level: Not on file   Occupational History    Not on file   Tobacco Use    Smoking status: Former     Current packs/day: 0.00     Types: Cigarettes     Quit date:      Years since quittin.3    Smokeless tobacco: Never   Vaping Use    Vaping status: Never Used   Substance and Sexual Activity    Alcohol use: Yes     Alcohol/week: 1.0 standard drink of alcohol     Types: 1 Glasses of wine per week     Comment: occasionally    Drug use: No    Sexual activity: Not on file   Other Topics Concern    Not on file   Social History Narrative    Not on file     Social Determinants of Health     Financial Resource Strain: Not on file   Food Insecurity: Not on file   Transportation Needs: Not on file   Physical Activity: Not on file   Stress: Not on file   Social Connections: Not on file   Housing Stability: Not on file       Current Medications:    Current Outpatient Medications:     cyclobenzaprine 10 MG Oral Tab, Take 1 tablet (10 mg total) by mouth nightly as needed., Disp: 20 tablet, Rfl: 0    zolpidem 5 MG Oral Tab, Take 1 tablet (5 mg total) by mouth nightly as needed for Sleep., Disp: 30 tablet, Rfl: 0    betamethasone dipropionate 0.05 % External Cream, Apply 1 Film topically 2 (two) times daily., Disp: , Rfl:     clonazePAM 0.5 MG Oral Tab, Take 1 tablet (0.5 mg total) by mouth daily as needed for Anxiety., Disp: 30 tablet, Rfl: 0    atorvastatin 20 MG Oral Tab, Take 1 tablet (20 mg total) by mouth daily., Disp: 90 tablet, Rfl: 3    Multiple Vitamins-Minerals (WOMENS 50+ MULTI VITAMIN/MIN) Oral Tab, Take by mouth., Disp: , Rfl:     LETROZOLE 2.5 MG Oral  Tab, TAKE 1 TABLET(2.5 MG) BY MOUTH DAILY (Patient not taking: Reported on 4/26/2024), Disp: 90 tablet, Rfl: 1    Allergies:  Allergies   Allergen Reactions    Morphine OTHER (SEE COMMENTS)     Pt states she had a mild seizure when Morphine was administered too fast IM        Review of Systems:  All other systems reviewed and negative x12    Vital Signs:  /73 (BP Location: Left arm, Patient Position: Sitting, Cuff Size: adult)   Pulse 84   Temp 98 °F (36.7 °C) (Oral)   Resp 16   Ht 1.6 m (5' 3\")   Wt 55.9 kg (123 lb 3.2 oz)   SpO2 99%   BMI 21.82 kg/m²     Physical Examination:  General: Patient is alert and oriented x 3, not in acute distress.  Psych:  Mood and affect appropriate  HEENT: EOMs intact. PERRL. Oropharynx is clear.   Neck: No JVD. No palpable lymphadenopathy. Neck is supple.  Lymphatics: There is no palpable peripheral lymphadenopathy   Chest: Symmetric expansion, nonlabored breathing  Cardiovascular: Regular with palpable distal pulses   Abdomen: Soft, non tender.   Extremities: No edema.  Neurological: 5/5 motor x4.        Laboratory:  Recent Labs   Lab 04/24/24  1037   WBC 6.9   HGB 12.0   .0   NE 3.39         Lab Results   Component Value Date     (H) 04/24/2024    BUN 23 04/24/2024    BUNCREA 30.3 (H) 04/24/2024    CREATSERUM 0.76 04/24/2024    ANIONGAP 3 04/24/2024    GFRNAA 71 04/14/2022    GFRAA 81 04/14/2022    CA 10.2 04/24/2024    OSMOCALC 294 04/24/2024    ALKPHO 100 04/24/2024    AST 30 04/24/2024    ALT 17 04/24/2024    BILT 0.6 04/24/2024    TP 7.3 04/24/2024    TP 6.9 04/24/2024    ALB 4.7 04/24/2024    ALB 4.25 04/24/2024    GLOBULIN 2.6 (L) 04/24/2024     04/24/2024    K 4.9 04/24/2024     04/24/2024    CO2 29.0 04/24/2024       Radiology:  none     Cancer Staging   Malignant neoplasm of right breast in female, estrogen receptor positive (HCC)  Staging form: Breast, AJCC 8th Edition  - Clinical stage from 7/30/2018: Stage IA (cT1c, cN0, cM0, G1,  ER+, MS+, HER2-) - Signed by Yojana Shay APRN on 8/19/2019  - Pathologic stage from 8/29/2018: Stage IA (pT1c, pN0, cM0, G1, ER+, MS+, HER2-) - Signed by Yojana Shay APRN on 8/19/2019      Impression and Plan:  83 year old  -year-old postmenopausal female status post right partial mastectomy 8/13/2018 for a stage I (pT1C) hormone receptor positive HER-2 negative right breast cancer. Her surgeon was Dr. Eason.  Ki 67% was 10%.   Based on age and favorable pathologic features it was not recommended that she receive adjuvant chemotherapy or radiation.  Her case was discussed at breast tumor board.  -Completed 5 years of anastrozole okay to monitor off AI    -Osteoporosis she is now following and this is being managed by endocrinology  -IgM kappa and IgG kappa gammopathy's we will follow no evidence of endorgan dysfunction repeat labs in 6 months     Borderline anemia stable    RTC 6 months     Data: moderate CBC CMP monoclonal protein study      Marcos aG MD

## 2024-05-09 ENCOUNTER — TELEPHONE (OUTPATIENT)
Dept: INTERNAL MEDICINE CLINIC | Facility: CLINIC | Age: 84
End: 2024-05-09

## 2024-05-09 NOTE — TELEPHONE ENCOUNTER
It would be difficult to tell her what specialist she should see if the problem has never been assessed by Dr. Gamez. If there are no eye issues there would be no reason to see an ophthalmologist.

## 2024-05-09 NOTE — TELEPHONE ENCOUNTER
Patient states she has had swelling down the right side of her nose for months.   Denies: swelling in the eye or eye issues, redness, or any trouble breathing.  Patient made an appt with Dr. Gabriel, ENT for 5/15.   Patient would like to know if she should see ENT or ophthalmology for her current issue?  Patient declines to see Dr. Gamez stating she only needs a referral and a recommendation on which specialist she should see.   Message sent to provider for further recommendations.     Nurse: per patient, ok to leave a detailed message on voicemail

## 2024-05-10 NOTE — TELEPHONE ENCOUNTER
Patient called us back and she was inform of Libra Montenegro and Dr. Gamez message below and patient will keep her appointment with Dr. Gabriel.    Future Appointments   Date Time Provider Department Center   5/15/2024  2:20 PM Yvonne Gabriel MD [facility]

## 2024-05-14 DIAGNOSIS — M54.31 SCIATICA OF RIGHT SIDE: ICD-10-CM

## 2024-05-14 DIAGNOSIS — M25.551 RIGHT HIP PAIN: ICD-10-CM

## 2024-05-14 RX ORDER — CYCLOBENZAPRINE HCL 10 MG
10 TABLET ORAL NIGHTLY PRN
Qty: 20 TABLET | Refills: 1 | Status: SHIPPED | OUTPATIENT
Start: 2024-05-14

## 2024-05-14 NOTE — TELEPHONE ENCOUNTER
Patient requesting refill on cyclobenzaprine. Out of medication. Would like to pick it up today. Prescription pended.

## 2024-05-20 ENCOUNTER — OFFICE VISIT (OUTPATIENT)
Dept: OTOLARYNGOLOGY | Facility: CLINIC | Age: 84
End: 2024-05-20

## 2024-05-20 VITALS — WEIGHT: 123 LBS | HEIGHT: 63 IN | BODY MASS INDEX: 21.79 KG/M2

## 2024-05-20 DIAGNOSIS — H04.301 DACRYOCYSTITIS OF RIGHT LACRIMAL SAC: Primary | ICD-10-CM

## 2024-05-20 NOTE — PROGRESS NOTES
Sona Yan is a 83 year old female.   Chief Complaint   Patient presents with    Sinus Problem     Right side nostril with facial swelling on and off for 2-3 months       ASSESSMENT AND PLAN:   1. Dacryocystitis of right lacrimal sac  This is an 83-year-old who presents with recurrent swelling near her right lacrimal sac.  She says that this has been going on for about 2 to 3 months she says it began after losing her son and that she has been crying on most days    On exam I did not see any blockage of the nasolacrimal duct in her nasal cavity or any purulence or tumor.  I did not see any active swelling or infection of her lacrimal sac but the area she points to is right where the lacrimal duct and sac would be    Discussed with her what she may be something is a blockage of the lacrimal duct or sac.  Discussed conservative measures including warm compress will place an ophthalmology referral for definitive diagnosis and possible treatment with cannulation if thought necessary.  Do not see any blockage in her nose of the nasal lacrimal duct opening.  She was agreeable to plan. Consult from Dr Gamez regarding facial swelling    - Ophthalmology Referral - In Network      The patient indicates understanding of these issues and agrees to the plan.      EXAM:   Ht 5' 3\" (1.6 m)   Wt 123 lb (55.8 kg)   BMI 21.79 kg/m²     Pertinent exam findings may also be noted above in assessment and plan     System Details   Skin Inspection - Normal.   Constitutional Overall appearance - Normal.   Head/Face Symmetric, TMJ tenderness not present    Eyes EOMI, PERRL   Right ear:  Canal clear, TM intact, no KATHRYN   Left ear:  Canal clear, TM intact, no KATHRYN   Nose: Septum midline, inferior turbinates not enlarged, nasal valves without collapse    Oral cavity/Oropharynx: No lesions or masses on inspection or palpation, tonsils symmetric    Neck: Soft without LAD, thyroid not enlarged  Voice clear/ no stridor   Other:      Scopes and  Procedures:     Nasal Endoscopy Procedure Note     Due to inability for adequate examination of the nose and nasopharynx and need for magnification to perform the examination, endoscopy was performed.  Risks and benefits were discussed with patient/family and they have given verbal consent to proceed.    Pre-operative Diagnosis:   1. Dacryocystitis of right lacrimal sac        Post-operative Diagnosis: Same    Procedure: Diagnostic nasal endoscopy    Anesthesia: Topical anesthetic Pelham     Surgeon Renzo Munoz MD    EBL: 0cc    Procedure Detail & Findings:     After placement of topical anesthetic intranasally the endoscope was inserted into each nares and driven through the nasal cavity into the nasopharynx. The following findings were noted:    Septum: Midline  Inferior turbinates: Normal  Middle meatus: Patent  Middle turbinates: Normal  Purulence: None noted  Polyps: None noted  Nasopharynx and eustachian tube: No masses  Other: The middle and superior meatus, the turbinates, and the spheno-ethmoid recess were inspected and seen to be without significant abnormal findings.   On exam I did not see any blockage of the nasolacrimal duct in her nasal cavity or any purulence or tumor.      Condition: Stable    Complications: Patient tolerated the procedure well with no immediate complication.    Renzo Munoz MD        Current Outpatient Medications   Medication Sig Dispense Refill    CYCLOBENZAPRINE 10 MG Oral Tab Take 1 tablet (10 mg total) by mouth nightly as needed 20 tablet 1    zolpidem 5 MG Oral Tab Take 1 tablet (5 mg total) by mouth nightly as needed for Sleep. 30 tablet 0    betamethasone dipropionate 0.05 % External Cream Apply 1 Film topically 2 (two) times daily.      clonazePAM 0.5 MG Oral Tab Take 1 tablet (0.5 mg total) by mouth daily as needed for Anxiety. 30 tablet 0    atorvastatin 20 MG Oral Tab Take 1 tablet (20 mg total) by mouth daily. 90 tablet 3    LETROZOLE 2.5 MG Oral Tab TAKE 1 TABLET(2.5  MG) BY MOUTH DAILY 90 tablet 1    Multiple Vitamins-Minerals (WOMENS 50+ MULTI VITAMIN/MIN) Oral Tab Take by mouth.        Past Medical History:    Anxiety    Breast CA (HCC)    Calculus of kidney    Cancer (HCC)    right breast Ca    Colitis    completed antibiotic therapy for C.Diff colitis    Colon polyps    Depression    High cholesterol    Incontinence    Other general symptoms(780.99)    torn rotator cuff left arm    Visual impairment      Social History:  Social History     Socioeconomic History    Marital status:    Tobacco Use    Smoking status: Former     Current packs/day: 0.00     Types: Cigarettes     Quit date:      Years since quittin.4    Smokeless tobacco: Never   Vaping Use    Vaping status: Never Used   Substance and Sexual Activity    Alcohol use: Yes     Alcohol/week: 1.0 standard drink of alcohol     Types: 1 Glasses of wine per week     Comment: occasionally    Drug use: No          Renzo Munoz MD  2024  1:53 PM

## 2024-05-30 ENCOUNTER — TELEPHONE (OUTPATIENT)
Dept: OPHTHALMOLOGY | Facility: CLINIC | Age: 84
End: 2024-05-30

## 2024-05-30 NOTE — TELEPHONE ENCOUNTER
Per patient has clogged tear duct, patient concerned, asking for appointment soon. Please call thank you.

## 2024-05-30 NOTE — TELEPHONE ENCOUNTER
I spoke to patient.  She has an appointment already scheduled with [facility] on 6/04/24.

## 2024-06-19 ENCOUNTER — TELEPHONE (OUTPATIENT)
Dept: HEMATOLOGY/ONCOLOGY | Facility: HOSPITAL | Age: 84
End: 2024-06-19

## 2024-06-19 ENCOUNTER — TELEPHONE (OUTPATIENT)
Dept: SURGERY | Facility: CLINIC | Age: 84
End: 2024-06-19

## 2024-06-19 NOTE — TELEPHONE ENCOUNTER
Pt called regarding the letter she received in the mail regarding Dr. Ga no longer practicing at [facility]. Very upset as it was a very cold letter and not written with the patient in mind. Discussed with her two physicians that would be good for her diagnosis, Dr. Grigsby and Dr. Ambrocio. She is waiting for Dr. Eason to call her back regarding her recommendation and then will make an appointment based on that. Emotional support given to patient.

## 2024-06-19 NOTE — TELEPHONE ENCOUNTER
Called patient back after receiving a voicemail about  no longer being with [facility], She was asking for guidance on who we would refer to . Recommended  or  . She stated she made an appointment with . All questions were answered. Patient appreciated call back. Encouraged patient to call if any further questions

## 2024-06-19 NOTE — TELEPHONE ENCOUNTER
Patient called feeling very anxious and wanted to express that she is asking specifically for only Dr. Eason's guidance on who to select as a new oncologist in Dr. Ga's place. She says she was provided a list of Oncologists and is anxious.    She would like to keep her 10/22 DX mammo, followed by her 10/23 Dr. Eason appt.    Her 10/23 Dr. Ga appt still needs to be removed.    Message was sent to clinical team to please call patient to discuss. Patient provided permission to leave very detailed message on her VM if she couldn't answer at the time of the call.

## 2024-06-22 DIAGNOSIS — F51.01 PRIMARY INSOMNIA: ICD-10-CM

## 2024-06-22 DIAGNOSIS — M54.31 SCIATICA OF RIGHT SIDE: ICD-10-CM

## 2024-06-22 DIAGNOSIS — M25.551 RIGHT HIP PAIN: ICD-10-CM

## 2024-06-24 RX ORDER — CYCLOBENZAPRINE HCL 10 MG
10 TABLET ORAL NIGHTLY PRN
Qty: 20 TABLET | Refills: 0 | Status: SHIPPED | OUTPATIENT
Start: 2024-06-24

## 2024-06-24 RX ORDER — ZOLPIDEM TARTRATE 5 MG/1
5 TABLET ORAL NIGHTLY PRN
Qty: 30 TABLET | Refills: 0 | Status: SHIPPED | OUTPATIENT
Start: 2024-06-24

## 2024-06-24 NOTE — TELEPHONE ENCOUNTER
Please review.  Protocol failed / Has no protocol. Marked High Priority, patient states out of medication    Zolpidem 5mg Recent fills each quantity 30 : 1/29, 2/26, 3/28, 4/26, 5/24  Due 6/28/24   Last prescription written: 4/24/24  Last office visit: 4/17/24    Cyclobenzaprine 10mg Recent fills each quantity 20 : 2/20, 3/10, 3/27, 4/24, 5/14, 6/2  Last prescription written: 5/14/24 # 20 plus 1 refill     Requested Prescriptions   Pending Prescriptions Disp Refills    CYCLOBENZAPRINE 10 MG Oral Tab [Pharmacy Med Name: Cyclobenzaprine Hydrochloride 10 Mg Tab Teva] 20 tablet 0     Sig: Take 1 tablet (10 mg total) by mouth nightly as needed       There is no refill protocol information for this order       ZOLPIDEM 5 MG Oral Tab [Pharmacy Med Name: Zolpidem Tartrate 5 Mg Tab Nort] 30 tablet 0     Sig: TAKE 1 TABLET BY MOUTH EVERY NIGHT as needed for sleep       Controlled Substance Medication Failed - 6/24/2024 11:08 AM        Failed - This medication is a controlled substance - forward to provider to refill           Future Appointments         Provider Department Appt Notes    In 4 months [address] [facility]     In 4 months Gonsalo Grigsby MD [facility] Hematology Oncology Dr. Ga patient.  F/U breast cancer and gammopathy    In 4 months Suze Eason MD [facility] F/U on 10.22.24 Mammo          Recent Outpatient Visits              1 month ago Dacryocystitis of right lacrimal sac    [facility] Renzo Munoz MD    Office Visit    1 month ago Monoclonal gammopathy    [facility] Hematology Oncology Marcos Ga MD    Office Visit    2 months ago Screening for skin cancer    [facility] Cindi Le APRN    Office Visit    2 months ago Anxiety disorder, unspecified type    [facility]  Francesca Cervantes MD    Office Visit    5 months ago Tendinopathy of gluteus medius    [facility], [facility], Michelle Guevara,     Office Visit

## 2024-07-14 DIAGNOSIS — M25.551 RIGHT HIP PAIN: ICD-10-CM

## 2024-07-14 DIAGNOSIS — M54.31 SCIATICA OF RIGHT SIDE: ICD-10-CM

## 2024-07-15 RX ORDER — CYCLOBENZAPRINE HCL 10 MG
10 TABLET ORAL NIGHTLY PRN
Qty: 20 TABLET | Refills: 0 | Status: SHIPPED | OUTPATIENT
Start: 2024-07-15

## 2024-07-15 NOTE — TELEPHONE ENCOUNTER
Patient is requesting 30 day fill    Asking if we can send high priority, only has 1 pill left

## 2024-07-24 DIAGNOSIS — F51.01 PRIMARY INSOMNIA: ICD-10-CM

## 2024-07-27 RX ORDER — ZOLPIDEM TARTRATE 5 MG/1
5 TABLET ORAL NIGHTLY PRN
Qty: 30 TABLET | Refills: 0 | Status: SHIPPED | OUTPATIENT
Start: 2024-07-27

## 2024-07-27 NOTE — TELEPHONE ENCOUNTER
Please review. Protocol Failed; No Protocol      Recent fills: 4/26/2024, 5/24/2024, 6/24/2024  Last Rx written: 6/24/2024  Last office visit: 4/4/2024    Recent Visits  Date Type Provider Dept   04/17/24 Office Visit Cindi Le APRN [facility]       Requested Prescriptions   Pending Prescriptions Disp Refills    ZOLPIDEM 5 MG Oral Tab [Pharmacy Med Name: Zolpidem Tartrate 5 Mg Tab Nort] 30 tablet 0     Sig: Take 1 tablet (5 mg total) by mouth nightly as needed for Sleep.       Controlled Substance Medication Failed - 7/24/2024 12:07 PM        Failed - This medication is a controlled substance - forward to provider to refill               Future Appointments         Provider Department Appt Notes    In 2 months [address] [facility]     In 2 months Gonsalo Grigsby MD [facility] Hematology Oncology Dr. Ga patient.  F/U breast cancer and gammopathy    In 2 months Suze Eason MD [facility] F/U on 10.22.24 Mammo          Recent Outpatient Visits              2 months ago Dacryocystitis of right lacrimal sac    [facility] Renzo Munoz MD    Office Visit    3 months ago Monoclonal gammopathy    [facility] Hematology Oncology Marcos Ga MD    Office Visit    3 months ago Screening for skin cancer    [facility] Cindi Le APRN    Office Visit    3 months ago Anxiety disorder, unspecified type    [facility] Francesca Gamez MD    Office Visit    6 months ago Tendinopathy of gluteus medius    [facility] Michelle Manjarrez DO    Office Visit

## 2024-07-27 NOTE — TELEPHONE ENCOUNTER
Paged by patient today. Frustrated for her zolpidem refill. Instructed her to start asking for her refill about 5 days in advance. Rx refilled for 30 days.

## 2024-08-05 DIAGNOSIS — M25.551 RIGHT HIP PAIN: ICD-10-CM

## 2024-08-05 DIAGNOSIS — M54.31 SCIATICA OF RIGHT SIDE: ICD-10-CM

## 2024-08-08 RX ORDER — CYCLOBENZAPRINE HCL 10 MG
10 TABLET ORAL NIGHTLY PRN
Qty: 20 TABLET | Refills: 0 | Status: SHIPPED | OUTPATIENT
Start: 2024-08-08

## 2024-08-08 NOTE — TELEPHONE ENCOUNTER
Please review. Protocol Failed; No Protocol    Requested Prescriptions   Pending Prescriptions Disp Refills    CYCLOBENZAPRINE 10 MG Oral Tab [Pharmacy Med Name: Cyclobenzaprine Hydrochloride 10 Mg Tab Teva] 20 tablet 0     Sig: Take 1 tablet (10 mg total) by mouth nightly as needed.       There is no refill protocol information for this order            Future Appointments         Provider Department Appt Notes    In 2 months [address] for [facility]     In 2 months Gonsalo Grigsby MD [facility] Hematology Oncology Dr. Ga patient.  F/U breast cancer and gammopathy    In 2 months Suze Eason MD [facility] F/U on 10.22.24 Mammo          Recent Outpatient Visits              2 months ago Dacryocystitis of right lacrimal sac    [facility] Renzo Munoz MD    Office Visit    3 months ago Monoclonal gammopathy    [facility] Hematology Oncology Marcos Ga MD    Office Visit    3 months ago Screening for skin cancer    [facility] Cindi Le APRN    Office Visit    4 months ago Anxiety disorder, unspecified type    [facility] Francesca Gamez MD    Office Visit    7 months ago Tendinopathy of gluteus medius    [facility] Michelle Manjarrez DO    Office Visit

## 2024-08-22 DIAGNOSIS — F51.01 PRIMARY INSOMNIA: ICD-10-CM

## 2024-08-23 NOTE — TELEPHONE ENCOUNTER
Please review; protocol failed/ has no protocol      Recent fills: 06/24/2024,05/24/2024,04/26/2024  Last Rx written: 06/24/2024  Last Office Visit: 04/17/2024    Recent Visits  Date Type Provider Dept   04/17/24 Office Visit Cindi Le APRN [facility]         Requested Prescriptions   Pending Prescriptions Disp Refills    ZOLPIDEM 5 MG Oral Tab [Pharmacy Med Name: Zolpidem Tartrate 5 Mg Tab Nort] 30 tablet 0     Sig: Take 1 tablet (5 mg total) by mouth nightly as needed for Sleep.       Controlled Substance Medication Failed - 8/22/2024 10:27 AM        Failed - This medication is a controlled substance - forward to provider to refill           Recent Outpatient Visits              3 months ago Dacryocystitis of right lacrimal sac    [facility] Renzo Munoz MD    Office Visit    3 months ago Monoclonal gammopathy    [facility] Hematology Oncology Marcos Ga MD    Office Visit    4 months ago Screening for skin cancer    [facility] Cindi Le APRN    Office Visit    4 months ago Anxiety disorder, unspecified type    [facility] Francesca Gamez MD    Office Visit    7 months ago Tendinopathy of gluteus medius    [facility] Michelle Manjarrez DO    Office Visit          Future Appointments         Provider Department Appt Notes    In 2 months [address] [facility]ography [facility]     In 2 months Gonsalo Grigsby MD [facility] Hematology Oncology Dr. Ga patient.  F/U breast cancer and gammopathy    In 2 months Suze Eason MD [facility], [facility] F/U on 10.22.24 Mammo

## 2024-08-24 DIAGNOSIS — M54.31 SCIATICA OF RIGHT SIDE: ICD-10-CM

## 2024-08-24 DIAGNOSIS — M25.551 RIGHT HIP PAIN: ICD-10-CM

## 2024-08-24 RX ORDER — ZOLPIDEM TARTRATE 5 MG/1
5 TABLET ORAL NIGHTLY PRN
Qty: 30 TABLET | Refills: 0 | Status: SHIPPED | OUTPATIENT
Start: 2024-08-24

## 2024-08-26 RX ORDER — CYCLOBENZAPRINE HCL 10 MG
10 TABLET ORAL NIGHTLY PRN
Qty: 20 TABLET | Refills: 0 | Status: SHIPPED | OUTPATIENT
Start: 2024-08-26

## 2024-08-26 NOTE — TELEPHONE ENCOUNTER
Please review. Protocol Failed; No Protocol    Requested Prescriptions   Pending Prescriptions Disp Refills    CYCLOBENZAPRINE 10 MG Oral Tab [Pharmacy Med Name: Cyclobenzaprine Hydrochloride 10 Mg Tab Teva] 20 tablet 0     Sig: Take 1 tablet (10 mg total) by mouth nightly as needed.       There is no refill protocol information for this order            Future Appointments         Provider Department Appt Notes    In 1 month [address] [facility]     In 1 month Gonsalo Grigsby MD [facility] Hematology Oncology Dr. Ga patient.  F/U breast cancer and gammopathy    In 1 month Suze Eason MD [facility], [facility] F/U on 10.22.24 Mammo          Recent Outpatient Visits              3 months ago Dacryocystitis of right lacrimal sac    [facility] Renzo Munoz MD    Office Visit    4 months ago Monoclonal gammopathy    [facility] Hematology Oncology Marcos Ga MD    Office Visit    4 months ago Screening for skin cancer    [facility] Cindi Le APRN    Office Visit    4 months ago Anxiety disorder, unspecified type    [facility] Francesca Gamez MD    Office Visit    7 months ago Tendinopathy of gluteus medius    [facility] Michelle Manjarrez DO    Office Visit

## 2024-09-16 DIAGNOSIS — M54.31 SCIATICA OF RIGHT SIDE: ICD-10-CM

## 2024-09-16 DIAGNOSIS — M25.551 RIGHT HIP PAIN: ICD-10-CM

## 2024-09-16 DIAGNOSIS — F51.01 PRIMARY INSOMNIA: ICD-10-CM

## 2024-09-18 NOTE — TELEPHONE ENCOUNTER
Please review.  Protocol failed / Has no protocol.    **Patient requesting 30 cyclobenzaprine.    Is it safe for patient to take zolpidem 5mg and cyclobenzaprine 10mg together every night long term?    Cyclobenzaprine 10mg Recent fills each quantity 20 : 6/2, 6/24, 7/15, 8/8, 8/26 due 9/18 at earliest  Last prescription written: 8/26/24    Zolpidem 5mg Recent fills each quantity 30 : 5/24, 6/24, 7/27 8/24 due 9/25 at earliest  Last prescription written: 8/24/24  Last office visit: 4/17/24       Requested Prescriptions   Pending Prescriptions Disp Refills    cyclobenzaprine 10 MG Oral Tab [Pharmacy Med Name: Cyclobenzaprine Hydrochloride 10 Mg Tab Teva] 30 tablet 0     Sig: Take 1 tablet (10 mg total) by mouth nightly as needed.       There is no refill protocol information for this order       ZOLPIDEM 5 MG Oral Tab [Pharmacy Med Name: Zolpidem Tartrate 5 Mg Tab Nort] 30 tablet 0     Sig: Take 1 tablet (5 mg total) by mouth nightly as needed for Sleep.       Controlled Substance Medication Failed - 9/18/2024 11:37 AM        Failed - This medication is a controlled substance - forward to provider to refill           Future Appointments         Provider Department Appt Notes    In 1 month [address] [facility]     In 1 month Gonsalo Grigsby MD [facility] Hematology Oncology Dr. Ga patient.  F/U breast cancer and gammopathy    In 1 month [facility]24 Mammo          Recent Outpatient Visits              4 months ago Dacryocystitis of right lacrimal sac    [facility] Renzo Munoz MD    Office Visit    4 months ago Monoclonal gammopathy    [facility] Hematology Oncology Marcos Ga MD    Office Visit    5 months ago Screening for skin cancer    [facility] Cindi Le APRN    Office Visit    5  months ago Anxiety disorder, unspecified type    [facility] [place]    Office Visit    8 months ago Tendinopathy of gluteus medius    [facility] [place],     Office Visit

## 2024-09-18 NOTE — TELEPHONE ENCOUNTER
Patient states that she is completely out of cyclobenzaprine. She is requesting to have a month supply instead of 20 tabs. Medication pended. Routing for protocol as high priority.

## 2024-09-19 ENCOUNTER — PATIENT MESSAGE (OUTPATIENT)
Dept: OTHER | Age: 84
End: 2024-09-19

## 2024-09-19 RX ORDER — CYCLOBENZAPRINE HCL 10 MG
10 TABLET ORAL NIGHTLY PRN
Qty: 20 TABLET | Refills: 0 | Status: SHIPPED | OUTPATIENT
Start: 2024-09-19

## 2024-09-19 RX ORDER — ZOLPIDEM TARTRATE 5 MG/1
5 TABLET ORAL NIGHTLY PRN
Qty: 30 TABLET | Refills: 0 | Status: SHIPPED | OUTPATIENT
Start: 2024-09-19

## 2024-09-19 NOTE — TELEPHONE ENCOUNTER
Pt needs f/u apt -- I dont see she has one -- this needs to be discussed , she had told me she uses flexeril prn -not daily

## 2024-09-30 NOTE — TELEPHONE ENCOUNTER
Patient scheduled appointment. She would like Dr. Gamez to be aware that she tried to schedule earlier but first available is in November. She was worried because she will still need refills for these medication until her appointment on 11/26/24. Routing as FYI.    Future Appointments   Date Time Provider Department Center   10/22/2024 10:00 AM [facility]   10/23/2024 12:00 PM Gonsalo Grigsby MD [facility] HEM ONC [facility]   10/23/2024  1:00 PM Suze Eason MD [facility]   11/26/2024  9:00 AM Francesca Gamez MD [facility]

## 2024-10-03 ENCOUNTER — TELEPHONE (OUTPATIENT)
Dept: HEMATOLOGY/ONCOLOGY | Facility: HOSPITAL | Age: 84
End: 2024-10-03

## 2024-10-03 DIAGNOSIS — M54.31 SCIATICA OF RIGHT SIDE: ICD-10-CM

## 2024-10-03 DIAGNOSIS — M25.551 RIGHT HIP PAIN: ICD-10-CM

## 2024-10-03 NOTE — TELEPHONE ENCOUNTER
Pt called and asked that orders be put in the system so she could go to the [facility] a few days before her appt with Dr. Grigsby 10/23/24    Pt is a former Dr. Ga, pt doesn't want a call back she only asked that the orders be in the system

## 2024-10-05 NOTE — TELEPHONE ENCOUNTER
Protocol Failed/ No Protocol    Requested Prescriptions   Pending Prescriptions Disp Refills    CYCLOBENZAPRINE 10 MG Oral Tab [Pharmacy Med Name: Cyclobenzaprine Hydrochloride 10 Mg Tab Teva] 20 tablet 0     Sig: Take 1 tablet (10 mg total) by mouth nightly as needed.       There is no refill protocol information for this order          Future Appointments         Provider Department Appt Notes    In 2 weeks [facility]ography [facility]     In 2 weeks Gonsalo Grigsby MD [facility] Hematology Oncology Dr. Ga patient.  F/U breast cancer and gammopathy    In 2 weeks Suze Eason MD [facility] F/U on 10.22.24 Mammo    In 1 month Francesca Gamez MD [facility] follow up - medication refills          Recent Outpatient Visits              4 months ago Dacryocystitis of right lacrimal sac    [facility] Renzo Munoz MD    Office Visit    5 months ago Monoclonal gammopathy    [facility] Hematology Oncology Marcos Ga MD    Office Visit    5 months ago Screening for skin cancer    [facility] Cindi Le APRN    Office Visit    6 months ago Anxiety disorder, unspecified type    [facility] Francesca Gamez MD    Office Visit    9 months ago Tendinopathy of gluteus medius    [facility] Michelle Manjarrez DO    Office Visit

## 2024-10-06 RX ORDER — CYCLOBENZAPRINE HCL 10 MG
10 TABLET ORAL NIGHTLY PRN
Qty: 20 TABLET | Refills: 0 | Status: SHIPPED | OUTPATIENT
Start: 2024-10-06

## 2024-10-18 ENCOUNTER — LAB ENCOUNTER (OUTPATIENT)
Dept: LAB | Facility: HOSPITAL | Age: 84
End: 2024-10-18
Attending: INTERNAL MEDICINE
Payer: MEDICARE

## 2024-10-18 DIAGNOSIS — D64.9 ANEMIA, UNSPECIFIED TYPE: ICD-10-CM

## 2024-10-18 DIAGNOSIS — D47.2 MONOCLONAL GAMMOPATHY: ICD-10-CM

## 2024-10-18 LAB
ALBUMIN SERPL-MCNC: 5.1 G/DL (ref 3.2–4.8)
ALBUMIN/GLOB SERPL: 2.1 {RATIO} (ref 1–2)
ALP LIVER SERPL-CCNC: 109 U/L
ALT SERPL-CCNC: 17 U/L
ANION GAP SERPL CALC-SCNC: 8 MMOL/L (ref 0–18)
AST SERPL-CCNC: 33 U/L (ref ?–34)
BASOPHILS # BLD AUTO: 0.02 X10(3) UL (ref 0–0.2)
BASOPHILS NFR BLD AUTO: 0.2 %
BILIRUB SERPL-MCNC: 0.5 MG/DL (ref 0.2–1.1)
BUN BLD-MCNC: 20 MG/DL (ref 9–23)
BUN/CREAT SERPL: 25 (ref 10–20)
CALCIUM BLD-MCNC: 10 MG/DL (ref 8.7–10.4)
CHLORIDE SERPL-SCNC: 107 MMOL/L (ref 98–112)
CO2 SERPL-SCNC: 26 MMOL/L (ref 21–32)
CREAT BLD-MCNC: 0.8 MG/DL
DEPRECATED RDW RBC AUTO: 42.1 FL (ref 35.1–46.3)
EGFRCR SERPLBLD CKD-EPI 2021: 72 ML/MIN/1.73M2 (ref 60–?)
EOSINOPHIL # BLD AUTO: 0.06 X10(3) UL (ref 0–0.7)
EOSINOPHIL NFR BLD AUTO: 0.7 %
ERYTHROCYTE [DISTWIDTH] IN BLOOD BY AUTOMATED COUNT: 12.9 % (ref 11–15)
FASTING STATUS PATIENT QL REPORTED: YES
GLOBULIN PLAS-MCNC: 2.4 G/DL (ref 2–3.5)
GLUCOSE BLD-MCNC: 104 MG/DL (ref 70–99)
HCT VFR BLD AUTO: 37.5 %
HGB BLD-MCNC: 12.5 G/DL
IMM GRANULOCYTES # BLD AUTO: 0.04 X10(3) UL (ref 0–1)
IMM GRANULOCYTES NFR BLD: 0.5 %
LYMPHOCYTES # BLD AUTO: 2.81 X10(3) UL (ref 1–4)
LYMPHOCYTES NFR BLD AUTO: 32.9 %
MCH RBC QN AUTO: 29.8 PG (ref 26–34)
MCHC RBC AUTO-ENTMCNC: 33.3 G/DL (ref 31–37)
MCV RBC AUTO: 89.5 FL
MONOCYTES # BLD AUTO: 1.25 X10(3) UL (ref 0.1–1)
MONOCYTES NFR BLD AUTO: 14.6 %
NEUTROPHILS # BLD AUTO: 4.36 X10 (3) UL (ref 1.5–7.7)
NEUTROPHILS # BLD AUTO: 4.36 X10(3) UL (ref 1.5–7.7)
NEUTROPHILS NFR BLD AUTO: 51.1 %
OSMOLALITY SERPL CALC.SUM OF ELEC: 295 MOSM/KG (ref 275–295)
PLATELET # BLD AUTO: 192 10(3)UL (ref 150–450)
POTASSIUM SERPL-SCNC: 4.1 MMOL/L (ref 3.5–5.1)
PROT SERPL-MCNC: 7.5 G/DL (ref 5.7–8.2)
RBC # BLD AUTO: 4.19 X10(6)UL
SODIUM SERPL-SCNC: 141 MMOL/L (ref 136–145)
WBC # BLD AUTO: 8.5 X10(3) UL (ref 4–11)

## 2024-10-18 PROCEDURE — 80053 COMPREHEN METABOLIC PANEL: CPT

## 2024-10-18 PROCEDURE — 83521 IG LIGHT CHAINS FREE EACH: CPT

## 2024-10-18 PROCEDURE — 86334 IMMUNOFIX E-PHORESIS SERUM: CPT

## 2024-10-18 PROCEDURE — 84165 PROTEIN E-PHORESIS SERUM: CPT

## 2024-10-18 PROCEDURE — 36415 COLL VENOUS BLD VENIPUNCTURE: CPT

## 2024-10-18 PROCEDURE — 85025 COMPLETE CBC W/AUTO DIFF WBC: CPT

## 2024-10-22 ENCOUNTER — HOSPITAL ENCOUNTER (OUTPATIENT)
Dept: MAMMOGRAPHY | Facility: HOSPITAL | Age: 84
Discharge: HOME | End: 2024-10-22
Attending: SURGERY
Payer: MEDICARE

## 2024-10-22 DIAGNOSIS — F51.01 PRIMARY INSOMNIA: ICD-10-CM

## 2024-10-22 DIAGNOSIS — Z17.0 CARCINOMA OF UPPER-OUTER QUADRANT OF RIGHT BREAST IN FEMALE, ESTROGEN RECEPTOR POSITIVE (HCC): ICD-10-CM

## 2024-10-22 DIAGNOSIS — C50.411 CARCINOMA OF UPPER-OUTER QUADRANT OF RIGHT BREAST IN FEMALE, ESTROGEN RECEPTOR POSITIVE (HCC): ICD-10-CM

## 2024-10-22 LAB
ALBUMIN SERPL ELPH-MCNC: 4.43 G/DL (ref 3.75–5.21)
ALBUMIN/GLOB SERPL: 1.6 {RATIO} (ref 1–2)
ALPHA1 GLOB SERPL ELPH-MCNC: 0.3 G/DL (ref 0.19–0.46)
ALPHA2 GLOB SERPL ELPH-MCNC: 0.92 G/DL (ref 0.48–1.05)
B-GLOBULIN SERPL ELPH-MCNC: 0.71 G/DL (ref 0.68–1.23)
GAMMA GLOB SERPL ELPH-MCNC: 0.84 G/DL (ref 0.62–1.7)
KAPPA LC FREE SER-MCNC: 4.48 MG/DL (ref 0.33–1.94)
KAPPA LC FREE/LAMBDA FREE SER NEPH: 3.51 {RATIO} (ref 0.26–1.65)
LAMBDA LC FREE SERPL-MCNC: 1.28 MG/DL (ref 0.57–2.63)
M PROTEIN 1 SERPL ELPH-MCNC: 0.2 G/DL (ref ?–0)
PROT SERPL-MCNC: 7.2 G/DL (ref 5.7–8.2)

## 2024-10-22 PROCEDURE — 77066 DX MAMMO INCL CAD BI: CPT | Performed by: SURGERY

## 2024-10-22 PROCEDURE — 77062 BREAST TOMOSYNTHESIS BI: CPT | Performed by: SURGERY

## 2024-10-23 ENCOUNTER — OFFICE VISIT (OUTPATIENT)
Dept: HEMATOLOGY/ONCOLOGY | Facility: HOSPITAL | Age: 84
End: 2024-10-23
Attending: INTERNAL MEDICINE
Payer: MEDICARE

## 2024-10-23 ENCOUNTER — OFFICE VISIT (OUTPATIENT)
Age: 84
End: 2024-10-23
Payer: MEDICARE

## 2024-10-23 VITALS
WEIGHT: 124 LBS | BODY MASS INDEX: 22 KG/M2 | TEMPERATURE: 99 F | DIASTOLIC BLOOD PRESSURE: 60 MMHG | HEART RATE: 82 BPM | OXYGEN SATURATION: 97 % | SYSTOLIC BLOOD PRESSURE: 121 MMHG | RESPIRATION RATE: 19 BRPM

## 2024-10-23 VITALS
HEART RATE: 82 BPM | TEMPERATURE: 99 F | RESPIRATION RATE: 18 BRPM | WEIGHT: 124 LBS | DIASTOLIC BLOOD PRESSURE: 60 MMHG | OXYGEN SATURATION: 97 % | SYSTOLIC BLOOD PRESSURE: 141 MMHG | BODY MASS INDEX: 21.97 KG/M2 | HEIGHT: 63 IN

## 2024-10-23 DIAGNOSIS — C50.411 MALIGNANT NEOPLASM OF UPPER-OUTER QUADRANT OF RIGHT BREAST IN FEMALE, ESTROGEN RECEPTOR POSITIVE (HCC): Primary | ICD-10-CM

## 2024-10-23 DIAGNOSIS — M81.0 AGE-RELATED OSTEOPOROSIS WITHOUT CURRENT PATHOLOGICAL FRACTURE: ICD-10-CM

## 2024-10-23 DIAGNOSIS — Z17.0 MALIGNANT NEOPLASM OF UPPER-OUTER QUADRANT OF RIGHT BREAST IN FEMALE, ESTROGEN RECEPTOR POSITIVE (HCC): Primary | ICD-10-CM

## 2024-10-23 DIAGNOSIS — Z17.0 CARCINOMA OF UPPER-OUTER QUADRANT OF RIGHT BREAST IN FEMALE, ESTROGEN RECEPTOR POSITIVE (HCC): Primary | ICD-10-CM

## 2024-10-23 DIAGNOSIS — C50.411 CARCINOMA OF UPPER-OUTER QUADRANT OF RIGHT BREAST IN FEMALE, ESTROGEN RECEPTOR POSITIVE (HCC): Primary | ICD-10-CM

## 2024-10-23 DIAGNOSIS — Z12.31 SCREENING MAMMOGRAM, ENCOUNTER FOR: ICD-10-CM

## 2024-10-23 DIAGNOSIS — Z85.3 ENCOUNTER FOR FOLLOW-UP SURVEILLANCE OF BREAST CANCER: ICD-10-CM

## 2024-10-23 DIAGNOSIS — D47.2 MONOCLONAL GAMMOPATHY: ICD-10-CM

## 2024-10-23 DIAGNOSIS — Z08 ENCOUNTER FOR FOLLOW-UP SURVEILLANCE OF BREAST CANCER: ICD-10-CM

## 2024-10-23 PROCEDURE — 99213 OFFICE O/P EST LOW 20 MIN: CPT | Performed by: INTERNAL MEDICINE

## 2024-10-23 PROCEDURE — 1126F AMNT PAIN NOTED NONE PRSNT: CPT | Performed by: SURGERY

## 2024-10-23 PROCEDURE — 1160F RVW MEDS BY RX/DR IN RCRD: CPT | Performed by: SURGERY

## 2024-10-23 PROCEDURE — 99213 OFFICE O/P EST LOW 20 MIN: CPT | Performed by: SURGERY

## 2024-10-23 PROCEDURE — 1159F MED LIST DOCD IN RCRD: CPT | Performed by: SURGERY

## 2024-10-23 NOTE — PROGRESS NOTES
HPI   Sona Yan is a 84 year old female here for follow up of Malignant neoplasm of upper-outer quadrant of right breast in female, estrogen receptor positive (HCC)    Encounter for follow-up surveillance of breast cancer    Monoclonal gammopathy    Age-related osteoporosis without current pathological fracture    Screening mammogram, encounter for.    She patient's had a diagnosis of right-sided breast cancer status post lumpectomy on 8/13/2018.  She was stage I, pT1c, N0, M0 ER positive, HER2 negative, Ki-67 10%.  Patient completed 5 years of adjuvant endocrine therapy with anastrozole on April 2024.  Patient was not recommended for radiation given age and size of tumor and favorable features.    She is continue to follow with breast imaging.  She just had her mammogram yesterday.    Last DEXA in 2021, she does have osteoporosis.    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    She has not noted any changes on SBE.  Has appointment with Dr. Eason today too.      ECOG PS 0    Review of Systems:     Review of Systems   Constitutional:  Negative for appetite change, fatigue and unexpected weight change.   HENT:           Sometimes has \"clump in the back of throat, when working in the yard\".   Respiratory:  Positive for shortness of breath (PEPE if weeding or shoveling snow.). Negative for cough.    Cardiovascular:  Positive for palpitations (The other day weeding.). Negative for chest pain.   Gastrointestinal:  Negative for abdominal pain.   Endocrine: Negative for hot flashes.   Musculoskeletal:  Positive for back pain (chronic LBP, states \"nothing any one can do about it\" .  Has followed with ortho due to bulging discs and states PT made pain worse.). Negative for arthralgias and neck pain.   Neurological:  Negative for dizziness and headaches.   Hematological:  Negative for adenopathy.   Psychiatric/Behavioral:  Negative for sleep disturbance.         Grieving loss of  her son that passed away 2 yrs ago and stress from grandson.         Current Outpatient Medications   Medication Sig Dispense Refill    cyclobenzaprine 10 MG Oral Tab Take 1 tablet (10 mg total) by mouth nightly as needed. 20 tablet 0    zolpidem 5 MG Oral Tab Take 1 tablet (5 mg total) by mouth nightly as needed for Sleep. 30 tablet 0    clonazePAM 0.5 MG Oral Tab Take 1 tablet (0.5 mg total) by mouth daily as needed for Anxiety. 30 tablet 0    atorvastatin 20 MG Oral Tab Take 1 tablet (20 mg total) by mouth daily. 90 tablet 3    Multiple Vitamins-Minerals (WOMENS 50+ MULTI VITAMIN/MIN) Oral Tab Take by mouth.       Allergies:   Allergies[1]    Past Medical History:    Anxiety    Breast CA (HCC)    Calculus of kidney    Cancer (HCC)    right breast Ca    Colitis    completed antibiotic therapy for C.Diff colitis    Colon polyps    Depression    High cholesterol    Incontinence    Other general symptoms(780.99)    torn rotator cuff left arm    Visual impairment     Past Surgical History:   Procedure Laterality Date    Cataract      Cholecystectomy      2017    Colonoscopy  2016    Cysto/uretero w/lithotripsy Right 2019    Right ureteroscopy with laser. Dr. Wiggins @ [facility]    Cystoscopy,insert ureteral stent      Ercp,diagnostic      2017    Fragmenting of kidney stone Right 2018    Rt. ESWL with pre-stenting, Dr. Wiggins @ [facility]    Hysterectomy      38    Lumpectomy right  2018     Social History     Socioeconomic History    Marital status:      Spouse name: Not on file    Number of children: Not on file    Years of education: Not on file    Highest education level: Not on file   Occupational History    Not on file   Tobacco Use    Smoking status: Former     Current packs/day: 0.00     Types: Cigarettes     Quit date:      Years since quittin.8    Smokeless tobacco: Never   Vaping Use    Vaping status: Never Used   Substance and Sexual Activity    Alcohol use: Yes      Alcohol/week: 1.0 standard drink of alcohol     Types: 1 Glasses of wine per week     Comment: occasionally    Drug use: No    Sexual activity: Not on file   Other Topics Concern    Not on file   Social History Narrative    Not on file     Social Drivers of Health     Financial Resource Strain: Not on file   Food Insecurity: Not on file   Transportation Needs: Not on file   Physical Activity: Not on file   Stress: Not on file   Social Connections: Not on file   Housing Stability: Not on file     Family History   Problem Relation Age of Onset    Cancer Father         throat ca    Heart Disorder Mother     Hypertension Sister     Heart Disorder Brother     Breast Cancer Self         77    Breast Cancer Paternal Aunt         age unknown/poss. late 30s         PHYSICAL EXAM:    /60 (BP Location: Left arm, Patient Position: Sitting, Cuff Size: adult)   Pulse 82   Temp 99.1 °F (37.3 °C) (Oral)   Resp 18   Ht 1.6 m (5' 3\")   Wt 56.2 kg (124 lb)   SpO2 97%   BMI 21.97 kg/m²   Wt Readings from Last 6 Encounters:   10/23/24 56.2 kg (124 lb)   05/20/24 55.8 kg (123 lb)   04/26/24 55.9 kg (123 lb 3.2 oz)   04/17/24 55.3 kg (122 lb)   04/04/24 56.9 kg (125 lb 6.4 oz)   01/08/24 56.2 kg (124 lb)     Physical Exam  General: Patient is alert, not in acute distress.  HEENT: EOMs intact. PERRL. Oropharynx is clear.   Neck: No JVD. No palpable lymphadenopathy. Neck is supple.  Chest: Clear to auscultation.  Breasts:  R breast with surgical changes no masses.  L breast no masses.  Has area of excoriation from insect bite on the UIQ.    Heart: Regular rate and rhythm.   Abdomen: Soft, non tender with good bowel sounds.  Extremities: No edema.  Arthritis of fingers, DJD.  Neurological: Grossly intact.   Lymphatics: There is no palpable lymphadenopathy throughout in the cervical, supraclavicular, axillary, or inguinal regions.  Psych/Depression: nl        ASSESSMENT/PLAN:     1. Malignant neoplasm of upper-outer quadrant of  right breast in female, estrogen receptor positive (HCC)    2. Encounter for follow-up surveillance of breast cancer    3. Monoclonal gammopathy    4. Age-related osteoporosis without current pathological fracture    5. Screening mammogram, encounter for      She patient's had a diagnosis of right-sided breast cancer status post lumpectomy on 8/13/2018.  She was stage I, pT1c, N0, M0 ER positive, HER2 negative, Ki-67 10%.  Patient completed 5 years of adjuvant endocrine therapy with anastrozole on April 2024.  Patient was not recommended for radiation given age and size of tumor and favorable features.    CBE negative, should continue with yearly CBE and monthly SBE.    She is continue to follow with breast imaging.  She just had her mammogram yesterday.  This was BI-RADS 2, she will be due for mammogram in 1 year.  Discussed with the patient that given her age, over 80, at any point in time she may discontinue breast imaging.    Last DEXA in 2021, she does have osteoporosis.  Patient is overdue for DEXA, order placed in the system.  Patient may need endocrinology evaluation given osteoporosis.    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  Her M spike is less than 1.5, she is IgG kappa she is low risk, continue to follow with serum protein electrophoresis with immunofixation and immunoglobulins every 12 months as low risk and advanced age.  Will review the patient's CBC and CMP from her PCP.      MDM low risk.      No orders of the defined types were placed in this encounter.      Results From Past 48 Hours:  No results found for this or any previous visit (from the past 48 hours).    Imaging & Referrals:  XR DEXA BONE DENSITOMETRY (CPT=77080)   No orders of the defined types were placed in this encounter.    Immunofixation   Monoclonal IgG kappa Identified.    Reviewed by Hany Macedo M.D.       Component      Latest Ref Rng 10/22/2023 1/4/2024 4/24/2024 10/18/2024   M-Ashkan       <=0.00 g/dL 0.22 (H)   0.27 (H)  0.20 (H)       Component      Latest Ref Rn 10/22/2023 1/4/2024 4/24/2024 10/18/2024   KAPPA FREE LIGHT CHAIN      0.330 - 1.940 mg/dL 3.700 (H)   4.112 (H)  4.482 (H)    LAMBDA FREE LIGHT CHAIN      0.571 - 2.630 mg/dL 1.292   1.274  1.276    KAPPA/LAMBDA FLC RATIO      0.26 - 1.65  2.86 (H)   3.23 (H)  3.51 (H)      Component      Latest Ref [facility] 4/24/2024 10/18/2024   WBC      4.0 - 11.0 x10(3) uL 6.9  8.5    RBC      3.80 - 5.30 x10(6)uL 4.16  4.19    Hemoglobin      12.0 - 16.0 g/dL 12.0  12.5    Hematocrit      35.0 - 48.0 % 36.7  37.5    MCV      80.0 - 100.0 fL 88.2  89.5    MCH      26.0 - 34.0 pg 28.8  29.8    MCHC      31.0 - 37.0 g/dL 32.7  33.3    RDW-SD      35.1 - 46.3 fL 40.6  42.1    RDW      11.0 - 15.0 % 12.5  12.9    Platelet Count      150.0 - 450.0 10(3)uL 181.0  192.0    Prelim Neutrophil Abs      1.50 - 7.70 x10 (3) uL 3.39  4.36    Neutrophils Absolute      1.50 - 7.70 x10(3) uL 3.39  4.36    Lymphocytes Absolute      1.00 - 4.00 x10(3) uL 2.36  2.81    Monocytes Absolute      0.10 - 1.00 x10(3) uL 1.06 (H)  1.25 (H)    Eosinophils Absolute      0.00 - 0.70 x10(3) uL 0.04  0.06    Basophils Absolute      0.00 - 0.20 x10(3) uL 0.02  0.02    Immature Granulocyte Absolute      0.00 - 1.00 x10(3) uL 0.02  0.04    Neutrophils %      % 49.1  51.1    Lymphocytes %      % 34.3  32.9    Monocytes %      % 15.4  14.6    Eosinophils %      % 0.6  0.7    Basophils %      % 0.3  0.2    Immature Granulocyte %      % 0.3  0.5    Glucose      70 - 99 mg/dL 100 (H)  104 (H)    Sodium      136 - 145 mmol/L 140  141    Potassium      3.5 - 5.1 mmol/L 4.9  4.1    Chloride      98 - 112 mmol/L 108  107    Carbon Dioxide, Total      21.0 - 32.0 mmol/L 29.0  26.0    ANION GAP      0 - 18 mmol/L 3  8    BUN      9 - 23 mg/dL 23  20    CREATININE      0.55 - 1.02 mg/dL 0.76  0.80    BUN/CREATININE RATIO      10.0 - 20.0  30.3 (H)  25.0 (H)    CALCIUM      8.7 - 10.4 mg/dL 10.2  10.0     CALCULATED OSMOLALITY      275 - 295 mOsm/kg 294  295    EGFR      >=60 mL/min/1.73m2 77  72    ALT (SGPT)      10 - 49 U/L 17  17    AST (SGOT)      <34 U/L 30  33    ALKALINE PHOSPHATASE      55 - 142 U/L 100  109    Total Bilirubin      0.2 - 1.1 mg/dL 0.6  0.5    PROTEIN, TOTAL      5.7 - 8.2 g/dL 7.3  7.5    Albumin      3.2 - 4.8 g/dL 4.7  5.1 (H)    Globulin      2.0 - 3.5 g/dL 2.6 (L)  2.4    A/G Ratio      1.0 - 2.0  1.8  2.1 (H)    Patient Fasting for CMP? Yes  Yes         PROCEDURE: [facility] KIARA 2D+3D DIAGNOSTIC NAMRATA BILAT (CPT=77066/01693)     COMPARISON: [facility] PF DIGITAL SCREEN W CAD, 2/20/2013, 9:32 AM.  [facility] KIARA 2D+3D DIAGNOSTIC NAMRATA BILAT (RML=[id number]), 10/05/2020, 1:05 PM.  [facility]  KIARA 2D+3D DIAGNOSTIC NAMRATA RIGHT (CPT=77065/89103), 4/22/2020, 10:38 AM.  [facility], [facility] KIARA 2D+3D DIAGNOSTIC NAMRATA BILAT (DJB=[id number]), 8/28/2019, 8:37 AM.  [facility] KIARA 2D+3D DIAGNOSTIC NAMRATA  RIGHT (CPT=[id number]), 2/27/2019, 8:10 AM.  [facility], [facility] POST PROCEDURAL IMAGE RIGHT (CPT=77065), 7/27/2018, 9:42 AM.  [facility] SCREENING BILAT (CPT=77067), 7/21/2018, 9:26 AM.  [facility] DIGITAL SCREEN W CAD PF, 8/11/2016, 10:58 AM.  [facility] KIARA 2D+3D DIAGNOSTIC NAMRATA RIGHT ([id number]), 4/06/2021, 10:39 AM.  [facility] KIARA 2D+3D  DIAGNOSTIC NAMRATA BILAT (FBT=[id number]), 10/13/2021, 10:26 AM.  [facility], [facility] KIARA 2D+3D DIAGNOSTIC NAMRATA BILAT (UZE=[id number]), 10/13/2022, 10:50 AM.  [facility], [facility] KIARA 2D+3D DIAGNOSTIC NAMRATA BILAT  (QJR=[id number]), 10/19/2023, 10:51 AM.     INDICATIONS: Z17.0 Carcinoma of upper-outer quadrant of right breast in female, estrogen  receptor positive (HCC) C50.411 Carcinoma of upper-outer quadrant of right breast i* 84-year-old patient with history of right breast malignancy diagnosed at age 77  status post treatment for annual screening mammogram.     TECHNIQUE: Digital diagnostic mammography was performed and images were reviewed with the [employer] 1.5.1.5 CAD device.  3D tomosynthesis was performed and reviewed.        BREAST COMPOSITION:   Category c-Heterogeneously dense, which may obscure small masses.        FINDINGS: Stable post surgical changes are seen in the right breast.  The parenchyma pattern is stable with no new suspicious asymmetry, mass, architectural distortion, or microcalcifications identified in either breast.        CONCLUSION:   Benign findings.  No mammographic evidence for breast malignancy.  As long as the patient's clinical breast exam remains unchanged, annual screening mammogram is recommended.     The density of the patient's breast tissue reduces mammographic sensitivity for detecting breast cancer. In the setting of a normal screening mammogram, supplemental screening with screening breast MRI, used as an adjunct to mammography, can detect  breast cancers that might be obscured by dense breast tissue on mammography.  Because of the density of the patient's breasts on mammography and personal history of breast cancer, the patient may benefit from supplemental screening with screening breast  MRI.      BI-RADS CATEGORY:    DIAGNOSTIC CATEGORY 2 - BENIGN FINDING:       RECOMMENDATIONS:  ROUTINE MAMMOGRAM AND CLINICAL EVALUATION IN 12 MONTHS.               PLEASE NOTE: NORMAL MAMMOGRAM DOES NOT EXCLUDE THE POSSIBILITY OF BREAST CANCER.  A CLINICALLY SUSPICIOUS PALPABLE LUMP SHOULD BE BIOPSIED.       For patients over the age of 40, the target due date for the patient's next mammogram has been entered into a reminder system.       Patient received a discharge summary from the technologist after completion of exam.      Breast marker legend used on images    Triangle = Palpable lump  Avoca = Skin tag or mole  BB = Nipple  Linear han = Scar  Square = Pain           Finalized by (CST): Osiris Alberto MD on 10/22/2024 at 10:55 AM                [place] [address] [place] [address]  [phone]    PROCEDURE: XR DEXA BONE DENSITOMETRY (CPT=77080)     COMPARISON: [facility], XR DEXA BONE DENSITOMETRY (CPT=77080), 2/19/2019, 4:33 PM.     INDICATIONS: Osteoporosis, unspecified osteoporosis type, unspecified pathological fracture presence     TECHNIQUE: Measurement of bone mineral density of the lumbar spine and hip was performed on a Hologic dual energy x-ray absorptiometry scanner.     FINDINGS:     LEFT FEMORAL NECK  BMD: 0.553 gm/sq. cm. T SCORE: -2.7 Z SCORE: -0.2     LEFT TOTAL HIP  BMD: 0.662 gm/sq. cm. T SCORE: -2.3 Z SCORE: -0.1     PA LUMBAR SPINE (L1 - L4)  BMD: 0.947 gm/sq. cm. T SCORE: -0.9 Z SCORE: 1.9        T scores are a comparison to sex-matched patients with mean peak bone mass and are given in standard deviation (s.d.).  Each 1 s.d. corresponds to approximately 10% below peak normal bone density.       WORLD HEALTH ORGANIZATION CRITERIA  NORMAL T SCORE: Above -1 s.d.    OSTEOPENIA T SCORE: Between -1 and -2.5 s.d.    OSTEOPOROSIS T SCORE: -2.5 s.d.     National Osteoporosis Foundation Clinician's Guide to Prevention and Treatment of Osteoporosis recommendations for treatment:  Post menopausal women and men age 50 and older presenting with the following should be considered for treatment:  * A hip or vertebral (clinical or morphometric) fracture  * T score < -2.5 at the femoral neck or spine after appropriate evaluation to exclude secondary causes.  * Low bone mass (T score between -1.0 and -2.5 at the femoral neck or spine) and a 10-year probability of a hip fracture > 3% or a 10-year probability of a major osteoporosis-related fracture  > 20% based on the [place]-adapted WHO algorithm              Impression  CONCLUSION:  1. Findings in the left femoral neck suggest osteoporosis, and there is increased fracture risk.  There has been increase in the BMD by 1.2% from previous study.  Findings in the total left hip suggest osteopenia, and there may be increased fracture  risk.  There has been increase in the BMD by 0.1% from previous study.  The 10 year fracture risk for major osteoporotic fracture is 37%, and for hip fracture is 27%.  2. Findings in the total AP lumbar spine are in the normal range, and there is no increased fracture risk.  There has been decrease in the BMD by 5.0% from previous study.           Dictated by (CST): Luis James MD on 2/25/2021 at 5:23 PM      Finalized by (CST): Luis James MD on 2/25/2021 at 5:26 PM                   [1]   Allergies  Allergen Reactions    Morphine OTHER (SEE COMMENTS)     Pt states she had a mild seizure when Morphine was administered too fast IM

## 2024-10-24 RX ORDER — ZOLPIDEM TARTRATE 5 MG/1
5 TABLET ORAL NIGHTLY PRN
Qty: 30 TABLET | Refills: 0 | Status: SHIPPED | OUTPATIENT
Start: 2024-10-24

## 2024-10-24 NOTE — TELEPHONE ENCOUNTER
Please review; protocol failed/ has no protocol      Recent fills: 09/22/2024,08/24/2024,06/24/2024  Last Rx written: 09/22/2024  Last Office Visit: 04/17/2024    Recent Visits  Date Type Provider Dept   04/17/24 Office Visit Cindi Le APRN [facility]     Future Appointments  Date Type Provider Dept   11/26/24 Appointment Francesca Gamez MD [facility]   Showing future appointments within next 150 days with a meds authorizing provider and meeting all other requirements        Requested Prescriptions   Pending Prescriptions Disp Refills    ZOLPIDEM 5 MG Oral Tab [Pharmacy Med Name: Zolpidem Tartrate 5 Mg Tab Nort] 30 tablet 0     Sig: Take 1 tablet (5 mg total) by mouth nightly as needed for Sleep.       Controlled Substance Medication Failed - 10/24/2024  2:53 PM        Failed - This medication is a controlled substance - forward to provider to refill           Recent Outpatient Visits              Yesterday Malignant neoplasm of upper-outer quadrant of right breast in female, estrogen receptor positive (HCC)    [facility] Gonsalo Grigsby MD    Office Visit    Yesterday     [facility] Suze Eason MD    Office Visit    5 months ago Dacryocystitis of right lacrimal sac    [facility] Renzo Munoz MD    Office Visit    6 months ago Monoclonal gammopathy    [facility] Marcos Ga MD    Office Visit    6 months ago Screening for skin cancer    [facility] Cindi Le APRN    Office Visit          Future Appointments         Provider Department Appt Notes    In 1 month Francesca Gamez MD [facility] follow up - medication refills    In 1 month [address] [facility]     In 1 year Gonsalo Grigsby MD [facility]  follow up visit.cl  1y

## 2024-10-26 DIAGNOSIS — M54.31 SCIATICA OF RIGHT SIDE: ICD-10-CM

## 2024-10-26 DIAGNOSIS — M25.551 RIGHT HIP PAIN: ICD-10-CM

## 2024-10-28 ENCOUNTER — TELEPHONE (OUTPATIENT)
Dept: INTERNAL MEDICINE CLINIC | Facility: CLINIC | Age: 84
End: 2024-10-28

## 2024-10-28 DIAGNOSIS — M25.551 RIGHT HIP PAIN: Primary | ICD-10-CM

## 2024-10-28 DIAGNOSIS — M54.31 SCIATICA OF RIGHT SIDE: ICD-10-CM

## 2024-10-28 RX ORDER — CYCLOBENZAPRINE HCL 10 MG
10 TABLET ORAL NIGHTLY PRN
Qty: 30 TABLET | Refills: 1 | Status: SHIPPED | OUTPATIENT
Start: 2024-10-28

## 2024-10-28 NOTE — TELEPHONE ENCOUNTER
Dr. TRACI Gamez: patient had requested a week ago. But we never received it.   She is requesting #[phone] with refill.     Requested Prescriptions     Pending Prescriptions Disp Refills    cyclobenzaprine 10 MG Oral Tab [Pharmacy Med Name: Cyclobenzaprine Hydrochloride 10 Mg Tab Teva] 30 tablet 1     Sig: Take 1 tablet (10 mg total) by mouth nightly as needed.         Recent Visits  Date Type Provider Dept   04/17/24 Office Visit Cindi Le APRN [facility]   04/04/24 Office Visit Francesca Gamez MD [facility]   01/04/24 Office Visit Francesca Gamez MD [facility]   11/28/23 Office Visit Francesca Gamez MD [facility]   05/22/23 Office Visit Francesca Gamez MD [facility]   Showing recent visits within past 540 days with a meds authorizing provider and meeting all other requirements  Future Appointments  Date Type Provider Dept   11/26/24 Appointment Francesca Gamez MD [facility]   Showing future appointments within next 150 days with a meds authorizing provider and meeting all other requirements

## 2024-10-28 NOTE — TELEPHONE ENCOUNTER
Denied cyclobenzaprine    Payer: [facility] Case ID: [id number]    [phone]    [phone]  Electronic appeal: Supported

## 2024-10-29 NOTE — TELEPHONE ENCOUNTER
Patient calling back,verified name and date of birth.  Reviewed recommendations from  Dr Gamez's  10/29/24 note below.  Patient prefers not to go back to Dr Manjarrez.  She will get her prescription with a discount card and consider appealing the denied prior authorization if needed.  Patient has no further questions.

## 2024-10-29 NOTE — TELEPHONE ENCOUNTER
Patient was called and inform of Dr. Franco Ma message below. Patient appreciated the call but will think about it

## 2024-10-31 NOTE — PROGRESS NOTES
Breast Surgery Surveillance Visit    Diagnosis: Right breast cancer status post lumpectomy on August 13, 2018.     Stage: Cancer Staging  Malignant neoplasm of right breast in female, estrogen receptor positive (HCC)  Staging form: Breast, AJCC 8th Edition  - Pathologic: pT1c, pN0, cM0 - Signed by Marcos Ga MD on 8/29/2018     Disease Status:  Surgical treatment complete,  letrozole completed October 2023, radiation therapy declined.    History of Present Illness:   Ms. Sona Yan is a 84 year old woman who presented with imaging detected right breast cancer.  The patient denies any palpable masses, nipple discharge, skin changes or axillary symptoms.  She has no prior history of breast disease or biopsies.  She has no family history of breast cancer.  She underwent a bilateral screening mammogram on July 21, 2018 and was found to have a new mass in the right breast for which additional imaging was recommended.  On July 24, 2018 she underwent additional views that confirmed heterogeneously dense breast tissue with a 1 cm irregular mass in the right breast at 9:00, 7 cm from the nipple for which an ultrasound-guided biopsy was recommended.  Targeted ultrasound of the right axilla demonstrated normal axillary lymph nodes.  On July 27, 2018 her right breast ultrasound-guided biopsy was performed and pathology confirmed infiltrating ductal carcinoma, grade 1, ER 50%, CT 50%, HER-2/yordy negative, Ki-67 10%.  She elected for lumpectomy and no judson interrogation was performed secondary to the patient's normal clinical exam and advanced age.  Her surgery took place without complication.  She denies any new concerns related to her breast since her last visit.  She declined radiation therapy.  She completed her letrozole treatment.  She had a bilateral diagnostic evaluation on October 22, 2024 that showed no concerning findings.  She denies any new clinical concerns.  She is here today for evaluation and  recommendations for further therapy.        Past Medical History:    Anxiety    Breast CA (HCC)    Calculus of kidney    Cancer (HCC)    right breast Ca    Colitis    completed antibiotic therapy for C.Diff colitis    Colon polyps    Depression    High cholesterol    Incontinence    Other general symptoms(780.99)    torn rotator cuff left arm    Visual impairment       Past Surgical History:   Procedure Laterality Date    Cataract      Cholecystectomy      2017    Colonoscopy  2016    Cysto/uretero w/lithotripsy Right 2019    Right ureteroscopy with laser. Dr. Wiggins @ [facility]    Cystoscopy,insert ureteral stent      Ercp,diagnostic      2017    Fragmenting of kidney stone Right 2018    Rt. ESWL with pre-stenting, Dr. Wiggins @ [facility]    Hysterectomy      38    Lumpectomy right  2018       Gynecological History:  Pt is a   Pt was 21 years old at time of first pregnancy.    She denies any cumulative breastfeeding history.  She achieved menarche at age 13   Age of Menopause: 38  Type: Hysterectomy with ovaries preserved  She has history of hormone replacement therapy for 20 years, last in .  She denies any history of oral contraceptive use.  She denies infertility treatment to achieve pregnancy.    Medications:     [DISCONTINUED] cyclobenzaprine 10 MG Oral Tab Take 1 tablet (10 mg total) by mouth nightly as needed. 20 tablet 0    [DISCONTINUED] zolpidem 5 MG Oral Tab Take 1 tablet (5 mg total) by mouth nightly as needed for Sleep. 30 tablet 0    clonazePAM 0.5 MG Oral Tab Take 1 tablet (0.5 mg total) by mouth daily as needed for Anxiety. 30 tablet 0    atorvastatin 20 MG Oral Tab Take 1 tablet (20 mg total) by mouth daily. 90 tablet 3    Multiple Vitamins-Minerals (WOMENS 50+ MULTI VITAMIN/MIN) Oral Tab Take by mouth.         Allergies:    Allergies   Allergen Reactions    Morphine OTHER (SEE COMMENTS)     Pt states she had a mild seizure when Morphine was administered too fast IM        Family History:   Family History   Problem Relation Age of Onset    Cancer Father         throat ca    Heart Disorder Mother     Hypertension Sister     Heart Disorder Brother     Breast Cancer Self         77    Breast Cancer Paternal Aunt         age unknown/poss. late 30s       She is not of Ashkenazi Anabaptism ancestry.    Social History:  History   Alcohol Use    1.0 standard drink of alcohol/week    1 Glasses of wine per week     Comment: occasionally         History   Smoking Status    Former    Types: Cigarettes   Smokeless Tobacco    Never     The patient is .  She has 2 children.  She is employed part-time at [employer] as a .    Review of Systems:  General:   The patient denies, fever, chills, night sweats, fatigue, generalized weakness, change in appetite or weight loss.    HEENT:     The patient denies eye irritation, cataracts, redness, glaucoma, yellowing of the eyes, change in vision or color blindness. The patient denies hearing loss, ringing in the ears, ear drainage, earaches, nasal congestion, nose bleeds, snoring, pain in mouth/throat, hoarseness, change in voice, facial trauma.    Respiratory:  The patient denies chronic cough, phlegm, hemoptysis, pleurisy/chest pain, pneumonia, asthma, wheezing, difficulty in breathing with exertion, emphysema, chronic bronchitis, shortness of breath or abnormal sound when breathing.     Cardiovascular:  There is no history of chest pain, chest pressure/discomfort, palpitations, irregular heartbeat, fainting or near-fainting, difficulty breathing when lying flat, SOB/Coughing at night, swelling of the legs or chest pain while walking.    Breasts:  See history of present illness    Gastrointestinal:     There is no history of difficulty or pain with swallowing, reflux symptoms, vomiting, dark or bloody stools, constipation, yellowing of the skin, indigestion, nausea, change in bowel habits, diarrhea, abdominal pain or vomiting blood.      Genitourinary:  The patient denies frequent urination,+ needing to get up at night to urinate, urinary hesitancy or retaining urine, painful urination, urinary incontinence, decreased urine stream, blood in the urine or vaginal/penile discharge.    Skin:    The patient denies rash, itching, skin lesions, dry skin, change in skin color or change in moles.     Hematologic/Lymphatic:  The patient denies easily bruising or bleeding or persistent swollen glands or lymph nodes.     Musculoskeletal:  The patient denies muscle aches/pain, joint pain, stiff joints, neck pain, back pain or bone pain.    Neuropsychiatric:  There is no history of migraines or severe headaches, seizure/epilepsy, speech problems, coordination problems, trembling/tremors, fainting/black outs, dizziness, memory problems, loss of sensation/numbness, problems walking, weakness, tingling or burning in hands/feet. There is no history of abusive relationship, bipolar disorder, sleep disturbance, +anxiety, depression or feeling of despair.    Endocrine:    There is no history of poor/slow wound healing, weight loss/gain, fertility or hormone problems, cold intolerance, thyroid disease.     Allergic/Immunologic:  There is no history of hives, hay fever, angioedema or anaphylaxis.    /60 (BP Location: Right arm, Patient Position: Sitting, Cuff Size: adult)   Pulse 82   Temp 99.1 °F (37.3 °C) (Temporal)   Resp 19   Wt 56.2 kg (124 lb)   SpO2 97%   BMI 21.97 kg/m²     Physical Examination:   This is a well-nourished, alert and oriented woman. There is not palpable cervical, supraclavicular or axillary adenopathy.  The neck is supple with a midline trachea and no thyromegaly. Range of motion is good at both shoulders. Breasts are symmetrical. The tumorectomy site is in the upper outer right  breast and shows no evidence of local recurrence. Both nipples are everted with no discharge. There is not dominant masses, focal nodularity or skin  changes on either side. The abdomen is soft, flat and nontender, with no palpable masses or organomegaly.    Impression:   Ms. Sona Yan is a 84 year old woman presents with h/o right breast cancer s/p lumpectomy.    Discussion and Plan:  I had a discussion with the Patient regarding her breast exam. On exam today I found her to be healing well since surgery with no signs of new or recurrent disease.  I personally reviewed her recent imaging which shows her to be healing well since surgery with no new concerns.  She has completed her endocrine therapy per medical oncology.  I anticipate she will be due for imaging of her bilateral breast  in Oct 2025.  She will return for clinical exam annually.  I encouraged her to continue monitoring her ROM and strength and explained that a referral to physical therapy may be warranted in the future if she identifies any limitations or restrictions. She was encouraged to contact the office with any questions or concerns prior to her next scheduled appointment.    This encounter lasted a total of 25 minutes, more than 50% of which was dedicated to the discussion of management options.

## 2024-11-10 DIAGNOSIS — F41.9 ANXIETY DISORDER, UNSPECIFIED TYPE: ICD-10-CM

## 2024-11-14 RX ORDER — CLONAZEPAM 0.5 MG/1
0.5 TABLET ORAL 2 TIMES DAILY PRN
Qty: 180 TABLET | Refills: 0 | Status: SHIPPED | OUTPATIENT
Start: 2024-11-14

## 2024-11-14 NOTE — TELEPHONE ENCOUNTER
Please review. Protocol Failed; No Protocol      Recent fills: 4/4/2024  Last Rx written: 4/4/2024  Last office visit: 4/4/2024    Recent Visits  Date Type Provider Dept   04/17/24 Office Visit Cindi Le APRN [facility]     Future Appointments  Date Type Provider Dept   11/26/24 Appointment Francesca Gamez MD [facility]   Showing future appointments within next 150 days with a meds authorizing provider and meeting all other requirements        Requested Prescriptions   Pending Prescriptions Disp Refills    CLONAZEPAM 0.5 MG Oral Tab [Pharmacy Med Name: Clonazepam 0.5 Mg Tab Auro] 180 tablet 0     Sig: TAKE ONE TABLET BY MOUTH TWICE DAILY as needed for anxiety       Controlled Substance Medication Failed - 11/14/2024 11:33 AM        Failed - This medication is a controlled substance - forward to provider to refill               Future Appointments         Provider Department Appt Notes    In 1 week Francesca Gamez MD [facility] follow up - medication refills    In 1 week [address] for [facility]     In 11 months Gonsalo Grigsby MD [facility] Hematology Oncology follow up visit.cl  1y          Recent Outpatient Visits              3 weeks ago Malignant neoplasm of upper-outer quadrant of right breast in female, estrogen receptor positive (HCC)    [facility] Hematology Oncology Gonsalo Grigsby MD    Office Visit    3 weeks ago Carcinoma of upper-outer quadrant of right breast in female, estrogen receptor positive (HCC)    [facility] Suze Eason MD    Office Visit    5 months ago Dacryocystitis of right lacrimal sac    [facility] Renzo Munoz MD    Office Visit    6 months ago Monoclonal gammopathy    [facility] Hematology Oncology Marcos Ga MD    Office Visit    7 months ago Screening for skin cancer     [facility], [place] [place], APRN    Office Visit

## 2024-11-20 DIAGNOSIS — F51.01 PRIMARY INSOMNIA: ICD-10-CM

## 2024-11-25 RX ORDER — ZOLPIDEM TARTRATE 5 MG/1
5 TABLET ORAL NIGHTLY PRN
Qty: 30 TABLET | Refills: 1 | Status: SHIPPED | OUTPATIENT
Start: 2024-11-25

## 2024-11-25 NOTE — TELEPHONE ENCOUNTER
Please review.  Protocol failed / Has no protocol.  Marked High Priority, patient states out of medication    Zolpidem 5mg Recent fills each quantity 30 : 7/27, 8/24, 9/22, 10/24 Due 11/25  Last prescription written: 10/24/24  Last office visit: 4/17/24    Future Appointments  Date Type Provider Dept   11/26/24 Appointment Francesca Gamez MD [facility]     *added 1 refill to get patient into 1/2025     Requested Prescriptions   Pending Prescriptions Disp Refills    ZOLPIDEM 5 MG Oral Tab [Pharmacy Med Name: Zolpidem Tartrate 5 Mg Tab Nort] 30 tablet 1     Sig: Take 1 tablet (5 mg total) by mouth nightly as needed for Sleep.       Controlled Substance Medication Failed - 11/25/2024 11:45 AM        Failed - This medication is a controlled substance - forward to provider to refill           Future Appointments         Provider Department Appt Notes    Tomorrow Francesca Gamez MD [facility] follow up - medication refills    In 2 days [address] for Health     In 11 months Gonsalo Grigsby MD [facility] Hematology Oncology follow up visit.cl  1y          Recent Outpatient Visits              1 month ago Malignant neoplasm of upper-outer quadrant of right breast in female, estrogen receptor positive (HCC)    [facility] Hematology Oncology Gonsalo Grigsby MD    Office Visit    1 month ago Carcinoma of upper-outer quadrant of right breast in female, estrogen receptor positive (HCC)    [facility] Suze Eason MD    Office Visit    6 months ago Dacryocystitis of right lacrimal sac    [facility] Renzo Munoz MD    Office Visit    7 months ago Monoclonal gammopathy    [facility] Hematology Oncology Marcos Ga MD    Office Visit    7 months ago Screening for skin cancer    [facility]  Cindi Le, APRN    Office Visit

## 2024-11-25 NOTE — TELEPHONE ENCOUNTER
Patient would like to follow up on her refill request for Zolpidem. Requesting to expedite the request and hoping to have it refilled today.    SET AS HIGH PRIORITY

## 2024-11-27 ENCOUNTER — HOSPITAL ENCOUNTER (OUTPATIENT)
Dept: BONE DENSITY | Facility: HOSPITAL | Age: 84
Discharge: HOME | End: 2024-11-27
Attending: INTERNAL MEDICINE
Payer: MEDICARE

## 2024-11-27 DIAGNOSIS — M81.0 AGE-RELATED OSTEOPOROSIS WITHOUT CURRENT PATHOLOGICAL FRACTURE: ICD-10-CM

## 2024-11-27 PROCEDURE — 77080 DXA BONE DENSITY AXIAL: CPT | Performed by: INTERNAL MEDICINE

## 2024-12-09 DIAGNOSIS — M81.0 OSTEOPOROSIS, POST-MENOPAUSAL: Primary | ICD-10-CM

## 2024-12-13 ENCOUNTER — TELEPHONE (OUTPATIENT)
Dept: HEMATOLOGY/ONCOLOGY | Facility: HOSPITAL | Age: 84
End: 2024-12-13

## 2024-12-18 ENCOUNTER — LAB ENCOUNTER (OUTPATIENT)
Dept: LAB | Facility: HOSPITAL | Age: 84
End: 2024-12-18
Attending: INTERNAL MEDICINE
Payer: MEDICARE

## 2024-12-18 DIAGNOSIS — Z00.00 PHYSICAL EXAM, ANNUAL: ICD-10-CM

## 2024-12-18 DIAGNOSIS — M81.0 OSTEOPOROSIS, POST-MENOPAUSAL: ICD-10-CM

## 2024-12-18 LAB
ALBUMIN SERPL-MCNC: 5.1 G/DL (ref 3.2–4.8)
ALBUMIN/GLOB SERPL: 2.1 {RATIO} (ref 1–2)
ALP LIVER SERPL-CCNC: 98 U/L
ALT SERPL-CCNC: 13 U/L
ANION GAP SERPL CALC-SCNC: 5 MMOL/L (ref 0–18)
AST SERPL-CCNC: 33 U/L (ref ?–34)
BILIRUB SERPL-MCNC: 0.5 MG/DL (ref 0.2–1.1)
BUN BLD-MCNC: 16 MG/DL (ref 9–23)
BUN/CREAT SERPL: 20.8 (ref 10–20)
CALCIUM BLD-MCNC: 10.4 MG/DL (ref 8.7–10.4)
CHLORIDE SERPL-SCNC: 108 MMOL/L (ref 98–112)
CHOLEST SERPL-MCNC: 186 MG/DL (ref ?–200)
CO2 SERPL-SCNC: 27 MMOL/L (ref 21–32)
CREAT BLD-MCNC: 0.77 MG/DL
DEPRECATED RDW RBC AUTO: 41.5 FL (ref 35.1–46.3)
EGFRCR SERPLBLD CKD-EPI 2021: 75 ML/MIN/1.73M2 (ref 60–?)
ERYTHROCYTE [DISTWIDTH] IN BLOOD BY AUTOMATED COUNT: 12.9 % (ref 11–15)
FASTING PATIENT LIPID ANSWER: YES
FASTING STATUS PATIENT QL REPORTED: YES
GLOBULIN PLAS-MCNC: 2.4 G/DL (ref 2–3.5)
GLUCOSE BLD-MCNC: 97 MG/DL (ref 70–99)
HCT VFR BLD AUTO: 36.9 %
HDLC SERPL-MCNC: 64 MG/DL (ref 40–59)
HGB BLD-MCNC: 12.1 G/DL
LDLC SERPL CALC-MCNC: 103 MG/DL (ref ?–100)
MCH RBC QN AUTO: 28.9 PG (ref 26–34)
MCHC RBC AUTO-ENTMCNC: 32.8 G/DL (ref 31–37)
MCV RBC AUTO: 88.1 FL
NONHDLC SERPL-MCNC: 122 MG/DL (ref ?–130)
OSMOLALITY SERPL CALC.SUM OF ELEC: 291 MOSM/KG (ref 275–295)
PLATELET # BLD AUTO: 193 10(3)UL (ref 150–450)
POTASSIUM SERPL-SCNC: 4.3 MMOL/L (ref 3.5–5.1)
PROT SERPL-MCNC: 7.5 G/DL (ref 5.7–8.2)
RBC # BLD AUTO: 4.19 X10(6)UL
SODIUM SERPL-SCNC: 140 MMOL/L (ref 136–145)
TRIGL SERPL-MCNC: 108 MG/DL (ref 30–149)
TSI SER-ACNC: 3.27 UIU/ML (ref 0.55–4.78)
VIT D+METAB SERPL-MCNC: 41.7 NG/ML (ref 30–100)
VLDLC SERPL CALC-MCNC: 18 MG/DL (ref 0–30)
WBC # BLD AUTO: 8.1 X10(3) UL (ref 4–11)

## 2024-12-18 PROCEDURE — 80053 COMPREHEN METABOLIC PANEL: CPT

## 2024-12-18 PROCEDURE — 80061 LIPID PANEL: CPT

## 2024-12-18 PROCEDURE — 85027 COMPLETE CBC AUTOMATED: CPT

## 2024-12-18 PROCEDURE — 36415 COLL VENOUS BLD VENIPUNCTURE: CPT

## 2024-12-18 PROCEDURE — 82306 VITAMIN D 25 HYDROXY: CPT

## 2024-12-18 PROCEDURE — 84443 ASSAY THYROID STIM HORMONE: CPT

## 2024-12-23 ENCOUNTER — TELEPHONE (OUTPATIENT)
Age: 84
End: 2024-12-23

## 2024-12-23 NOTE — TELEPHONE ENCOUNTER
Pt called stated she would like Dr. Grigsby to prescribe her a high dose vitamin D replacement     Please give the pt a call back

## 2025-01-22 DIAGNOSIS — F51.01 PRIMARY INSOMNIA: ICD-10-CM

## 2025-01-25 DIAGNOSIS — M25.551 RIGHT HIP PAIN: ICD-10-CM

## 2025-01-25 DIAGNOSIS — M54.31 SCIATICA OF RIGHT SIDE: ICD-10-CM

## 2025-01-25 NOTE — TELEPHONE ENCOUNTER
Patient called for an update on the following medication request:      Disp Refills Start End    zolpidem 5 MG Oral Tab 30 tablet 1 11/25/2024 --    Sig - Route: Take 1 tablet (5 mg total) by mouth nightly as needed for Sleep. - Oral    Sent to pharmacy as: Zolpidem Tartrate 5 MG Oral Tablet (Ambien)    E-Prescribing Status: Receipt confirmed by pharmacy (11/25/2024  3:01 PM CST)       Disp Refills Start End    cyclobenzaprine 10 MG Oral Tab 30 tablet 1 11/26/2024 --    Sig - Route: Take 1 tablet (10 mg total) by mouth nightly as needed. - Oral    Sent to pharmacy as: Cyclobenzaprine HCl 10 MG Oral Tablet (Flexeril)    E-Prescribing Status: Receipt confirmed by pharmacy (11/26/2024  9:29 AM CST)    Prior authorization: Closed - Other        Patient is out of the Zolpidem.     Pharmacy: [id number] - [address] [phone], [phone]

## 2025-01-27 DIAGNOSIS — F51.01 PRIMARY INSOMNIA: ICD-10-CM

## 2025-01-27 RX ORDER — ZOLPIDEM TARTRATE 5 MG/1
5 TABLET ORAL NIGHTLY PRN
Qty: 30 TABLET | Refills: 0 | Status: SHIPPED | OUTPATIENT
Start: 2025-01-27

## 2025-01-27 RX ORDER — CYCLOBENZAPRINE HCL 10 MG
10 TABLET ORAL NIGHTLY PRN
Qty: 30 TABLET | Refills: 0 | Status: SHIPPED | OUTPATIENT
Start: 2025-01-27

## 2025-01-27 NOTE — TELEPHONE ENCOUNTER
Please review. Protocol Failed; No Protocol      Recent fills: 11/25/2024, 12/23/2024  Last Rx written: 11/25/2024  Last office visit: 11/26/2024    Future Appointments  Date Type Provider Dept   02/27/25 Appointment Francesca Gamez MD [facility]   Showing future appointments within next 150 days with a meds authorizing provider and meeting all other requirements        Requested Prescriptions   Pending Prescriptions Disp Refills    ZOLPIDEM 5 MG Oral Tab [Pharmacy Med Name: Zolpidem Tartrate 5 Mg Tab Nort] 30 tablet 0     Sig: Take 1 tablet (5 mg total) by mouth nightly as needed for Sleep.       Controlled Substance Medication Failed - 1/27/2025  8:49 AM        Failed - This medication is a controlled substance - forward to provider to refill        Passed - Medication is active on med list               Future Appointments         Provider Department Appt Notes    In 1 month Francesca Gamez MD [facility] last AWV 01/04/2024    In 9 months Gonsalo Grigsby MD [facility] Hematology Oncology [place] follow up visit.cl  1y          Recent Outpatient Visits              2 months ago Stress    [facility] Francesca Gamez MD    Office Visit    3 months ago Malignant neoplasm of upper-outer quadrant of right breast in female, estrogen receptor positive ([facility])    [facility] Hematology Oncology Gonsalo Grigsby MD    Office Visit    3 months ago Carcinoma of upper-outer quadrant of right breast in female, estrogen receptor positive (HCC)    [facility]    Office Visit    8 months ago Dacryocystitis of right lacrimal sac    [facility] Renzo Munoz MD    Office Visit    9 months ago Monoclonal gammopathy    [facility] Hematology Oncology Marcos Ga MD    Office Visit

## 2025-01-27 NOTE — TELEPHONE ENCOUNTER
Patient following up on refill.  Patient states she is out of medication.  Please advise. Patient would like to  today.  See also zolpidem request.

## 2025-01-27 NOTE — TELEPHONE ENCOUNTER
Please review. Protocol Failed; No Protocol    Requested Prescriptions   Pending Prescriptions Disp Refills    CYCLOBENZAPRINE 10 MG Oral Tab [Pharmacy Med Name: Cyclobenzaprine Hydrochloride 10 Mg Tab Teva] 30 tablet 0     Sig: Take 1 tablet (10 mg total) by mouth nightly as needed.       There is no refill protocol information for this order            Future Appointments         Provider Department Appt Notes    In 1 month Francesca Gamez MD [facility] last AWV 01/04/2024    In 9 months Gonsalo Grigsby MD [facility] H[facility] On[facility] [place] follow up visit.cl  1y          Recent Outpatient Visits              2 months ago Stress    [facility] Francesca Gamez MD    Office Visit    3 months ago Malignant neoplasm of upper-outer quadrant of right breast in female, estrogen receptor positive (HCC)    [facility] Hematology Oncology Gonsalo Grigsby MD    Office Visit    3 months ago Carcinoma of upper-outer quadrant of right breast in female, estrogen receptor positive (HCC)    [facility] Suze Eason MD    Office Visit    8 months ago Dacryocystitis of right lacrimal sac    [facility] Renzo Munoz MD    Office Visit    9 months ago Monoclonal gammopathy    [facility] Hematology Oncology Marcos Ga MD    Office Visit

## 2025-01-31 RX ORDER — ZOLPIDEM TARTRATE 5 MG/1
5 TABLET ORAL NIGHTLY PRN
Qty: 30 TABLET | Refills: 0 | OUTPATIENT
Start: 2025-01-31

## 2025-01-31 NOTE — TELEPHONE ENCOUNTER
zolpidem 5 MG Oral Tab 30 tablet 0 1/27/2025 --    Sig - Route: Take 1 tablet (5 mg total) by mouth nightly as needed for Sleep. - Oral    Sent to pharmacy as: Zolpidem Tartrate 5 MG Oral Tablet (Ambien)    E-Prescribing Status: Receipt confirmed by pharmacy (1/27/2025  3:19 PM CST)    No prior authorization was found for this prescription.    Found prior authorization for another prescription for the same medication: Closed - Other      Associated Diagnoses    Primary insomnia        Pharmacy    [id number] - [place], [place] - [address] [phone], [phone]

## 2025-02-19 DIAGNOSIS — F51.01 PRIMARY INSOMNIA: ICD-10-CM

## 2025-02-19 DIAGNOSIS — M25.551 RIGHT HIP PAIN: ICD-10-CM

## 2025-02-19 DIAGNOSIS — M54.31 SCIATICA OF RIGHT SIDE: ICD-10-CM

## 2025-02-20 ENCOUNTER — TELEPHONE (OUTPATIENT)
Dept: INTERNAL MEDICINE CLINIC | Facility: CLINIC | Age: 85
End: 2025-02-20

## 2025-02-20 DIAGNOSIS — E78.5 HYPERLIPIDEMIA LDL GOAL <130: Primary | ICD-10-CM

## 2025-02-20 PROBLEM — C50.411 CARCINOMA OF UPPER-OUTER QUADRANT OF RIGHT BREAST IN FEMALE, ESTROGEN RECEPTOR POSITIVE (HCC): Status: RESOLVED | Noted: 2023-10-27 | Resolved: 2025-02-20

## 2025-02-20 PROBLEM — Z85.3 HISTORY OF BREAST CANCER: Status: ACTIVE | Noted: 2018-08-29

## 2025-02-20 PROBLEM — Z17.0 CARCINOMA OF UPPER-OUTER QUADRANT OF RIGHT BREAST IN FEMALE, ESTROGEN RECEPTOR POSITIVE (HCC): Status: RESOLVED | Noted: 2023-10-27 | Resolved: 2025-02-20

## 2025-02-20 NOTE — TELEPHONE ENCOUNTER
She ended up getting the labs done after I had ordered it so we will hold off on further blood work right now

## 2025-02-20 NOTE — TELEPHONE ENCOUNTER
Patient is scheduled for Medicare annual on 2/27/25.  Patient asking if she should get blood work done prior to her appointment.  Last office visit 11/26/24:  Fasting labs to be done before her  Medicare visit/ Medicare physical     Labs pended. Please review and sign if approved.  Diagnosis, hyperlipidemia

## 2025-02-24 NOTE — TELEPHONE ENCOUNTER
Please review.  Protocol Failed.    Safety concern: patient also uses Clonazepam 0.5mg #180 filled 11/14/24    Zolpidem 5mg Recent fills each # 30 : 10/24, 11/25, 12/23, 1/27  Last prescription written: 1/27/25    Cyclobenzaprine 10mg Recent fills each # 30 : 10/28, 11/26, 12/27, 1/27  Last prescription written: 1/27/25  Last office visit:  11/26/2024    Future Appointments   Date Time Provider Department Center   2/27/2025 12:00 PM Francesca Gamez MD [facility]         Requested Prescriptions   Pending Prescriptions Disp Refills    ZOLPIDEM 5 MG Oral Tab [Pharmacy Med Name: Zolpidem Tartrate 5 Mg Tab Nort] 30 tablet 0     Sig: Take 1 tablet (5 mg total) by mouth nightly as needed for Sleep.       Controlled Substance Medication Failed - 2/24/2025  3:38 PM        Failed - This medication is a controlled substance - forward to provider to refill        Passed - Medication is active on med list          CYCLOBENZAPRINE 10 MG Oral Tab [Pharmacy Med Name: Cyclobenzaprine Hydrochloride 10 Mg Tab Teva] 30 tablet 0     Sig: Take 1 tablet (10 mg total) by mouth nightly as needed.       There is no refill protocol information for this order

## 2025-02-25 RX ORDER — CYCLOBENZAPRINE HCL 10 MG
10 TABLET ORAL NIGHTLY PRN
Qty: 30 TABLET | Refills: 0 | Status: SHIPPED | OUTPATIENT
Start: 2025-02-25 | End: 2025-02-27

## 2025-02-25 RX ORDER — ZOLPIDEM TARTRATE 5 MG/1
5 TABLET ORAL NIGHTLY PRN
Qty: 30 TABLET | Refills: 0 | Status: SHIPPED | OUTPATIENT
Start: 2025-02-25

## 2025-02-26 ENCOUNTER — TELEPHONE (OUTPATIENT)
Dept: INTERNAL MEDICINE CLINIC | Facility: CLINIC | Age: 85
End: 2025-02-26

## 2025-02-26 NOTE — TELEPHONE ENCOUNTER
Dr. Ma,     Cyclobenzaprine DENIED    Would you like to try any alternatives? Medication was denied in October 2024 too.     Denied    Payer: [facility] Case ID: [id number]    [phone]  Electronic appeal: Supported  View History

## 2025-02-26 NOTE — TELEPHONE ENCOUNTER
Prior Authorization for Cyclobenzaprine completed through [employer].    Last prior auth was denied on 10-.    Awaiting outcome.

## 2025-02-27 ENCOUNTER — OFFICE VISIT (OUTPATIENT)
Dept: INTERNAL MEDICINE CLINIC | Facility: CLINIC | Age: 85
End: 2025-02-27
Payer: MEDICARE

## 2025-02-27 VITALS — BODY MASS INDEX: 21.51 KG/M2 | HEIGHT: 63 IN | WEIGHT: 121.38 LBS

## 2025-02-27 DIAGNOSIS — D47.2 GAMMOPATHY: ICD-10-CM

## 2025-02-27 DIAGNOSIS — F13.20 SEDATIVE, HYPNOTIC OR ANXIOLYTIC DEPENDENCE, UNCOMPLICATED (HCC): ICD-10-CM

## 2025-02-27 DIAGNOSIS — N20.0 STONE IN RENAL PELVIS: ICD-10-CM

## 2025-02-27 DIAGNOSIS — M54.31 SCIATICA OF RIGHT SIDE: ICD-10-CM

## 2025-02-27 DIAGNOSIS — Z85.3 HISTORY OF BREAST CANCER: ICD-10-CM

## 2025-02-27 DIAGNOSIS — N20.0 MULTIPLE KIDNEY STONES: ICD-10-CM

## 2025-02-27 DIAGNOSIS — M75.112 INCOMPLETE TEAR OF LEFT ROTATOR CUFF, UNSPECIFIED WHETHER TRAUMATIC: ICD-10-CM

## 2025-02-27 DIAGNOSIS — M25.551 RIGHT HIP PAIN: ICD-10-CM

## 2025-02-27 DIAGNOSIS — M41.56 SCOLIOSIS OF LUMBAR REGION DUE TO DEGENERATIVE DISEASE OF SPINE IN ADULT: ICD-10-CM

## 2025-02-27 DIAGNOSIS — F41.9 ANXIETY DISORDER, UNSPECIFIED TYPE: ICD-10-CM

## 2025-02-27 DIAGNOSIS — M51.26 LUMBAR DISC HERNIATION: ICD-10-CM

## 2025-02-27 DIAGNOSIS — M81.0 AGE-RELATED OSTEOPOROSIS WITHOUT CURRENT PATHOLOGICAL FRACTURE: ICD-10-CM

## 2025-02-27 DIAGNOSIS — Z00.00 ENCOUNTER FOR ANNUAL HEALTH EXAMINATION: Primary | ICD-10-CM

## 2025-02-27 DIAGNOSIS — E78.5 HYPERLIPIDEMIA LDL GOAL <130: ICD-10-CM

## 2025-02-27 DIAGNOSIS — F51.01 PRIMARY INSOMNIA: ICD-10-CM

## 2025-02-27 RX ORDER — CYCLOBENZAPRINE HCL 10 MG
10 TABLET ORAL NIGHTLY PRN
Qty: 30 TABLET | Refills: 3 | Status: SHIPPED | OUTPATIENT
Start: 2025-02-27

## 2025-02-27 NOTE — PROGRESS NOTES
Subjective:   Sona Yan is a 84 year old female who presents for a MA AHA (Medicare Advantage Annual Health Assessment) and Subsequent Annual Wellness visit (Pt already had Initial Annual Wellness) and scheduled follow up of multiple significant but stable problems.     Pt comes for her  Medicare physical  Overall doing much better                 HISTORY 11/2024   Saw [facility] and was told to come back once a yr         HISTORY  Having difficulty dealing with her grandson who lives with her   She stopped the the lexapro - she feels she doesn't need it anymore      HISTORY  Has started taking the lexapro and feels better   Last visit saw physiatry and received a shot and feels better after that           HISTORY  Reviewed note from Ortho-- she was referred to physical therapy but she was not interested bc  PT made her pain worse per pt , took T#3 last night but she is still in pain so refer to physiatry for further evaluation and treatment options     Reviewed x-ray of the right hip and noted only mild arthritis on 11/2023   lumbar x-ray in 2021 did show multilevel DJD     Taking 2 in the evening and one ambien and stil wakes up at night   Has been like this her whole life per pt   The lexapro made her \"wired \" when she took it at night   She lost her son 6 mns yest , still has a hard time with it   Declines speaking to a therapist --had long discussion and even before that with even with  normal, bereavement she may benefit from speaking to therapist     Occ the pain shoots down her leg , didn't sleep all night due to pain    8/10   Worse with certain movements   No help with heat   Better with ice packs      Sleeps only 5 hrs a night and goes to  bed at 1 am and has been like that for a yrs   Son benjamin passed away one yr   Her current issues started the day after thanksgiving            HISTORY  She states she has never slept well and gets up at night , every night -- willing to decrease how she takes the  clonazepam and would like to try something for sleep         HISTORY   history reviewed from nurse and confirmed with pt   reports over the weekend, she took a train ride x 3 hrs with friends to [place], [place] (3 hours each way). Patient reports she did do a lot of walking--in and out of the car. Patient denies fall or trauma, but did notice multiple small bruises to bilateral shins and fatigue. \"I don't know if it's because I'm 81 or what, but why would bruises show up if I didn't hit anything?\"     Patient did apply Camphor calming balm to shins and bruises have dissapated--denies calf pain or swelling--no c/o of chest pain or shortness of breath--speaking in clear, full sentences.  ------------------------------  Pt states she is under a lot of stress - son lives with her and he has diabetes and   Still has severe back pain - saw dr toney her ortho and mri was ordered and was referred to a pain specialist   OP - couldn't toelrate fosamax and doesn't want any  meds -recommended Prolia by Endo but she is scared of side effects         HISTORY   Has h/o herniated disk of L 3 L4  And L5   Started PT nov 17th 2020 and still having a lot of pain   \"im not getting anywhere \"   PT told her she needed an MRI \"  She just saw PT last week   After sitting , when she gets up - \"he pain is out of the roof \"   Noted that this is complicated with the use of letrozole  Will go for dexa this week   Points to lower back and sometimes foes down the right leg , sometimes the rigth hip is affected as well  Better- ice and heat but it comes back, doesn't like to taking anything for it , has tried tyelnol    Worse - a lot of exertion even walking like after shopping , getting up after sitting   No urinary or stool incontinence or retention     upset as to why she isnt get the covid vaccine                  History   New pt - referred  By medardo zacarias her ex DIL   Used to see dr padron in [place]   C/c establish care   C/o    mammogram and next day has apt with dr moreno and dr hurtado     in 2017   Has encough refills   Has questions about her blood work and her her monoclonal      PMH  HL  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 Depression- used to be on lexapro which helped   H/o kidney stones         Destinee thomas Dr      Lives with son - son lives with son   Sad bc  3 yrs ago -  from copd    -----------------------------------------------------------------------------       History/Other:   Fall Risk Assessment:   She has been screened for Falls and is High Risk. Fall Prevention information provided to patient in After Visit Summary.    Do you feel unsteady when standing or walking?: No  Do you worry about falling?: Yes  Have you fallen in the past year?: No     Cognitive Assessment:   She had a completely normal cognitive assessment - see flowsheet entries     Functional Ability/Status:    Farrah has a completely normal functional assessment. See flowsheet for details.        Depression Screening (PHQ):  PHQ-2 SCORE: 0  , done 2025             Advanced Directives:   She does have a Living Will but we do NOT have it on file in Epic.    She does have a POA but we do NOT have it on file in Epic.    Discussed Advance Care Planning with patient (and family/surrogate if present). Standard forms made available to patient in After Visit Summary.      Patient Active Problem List   Diagnosis    Incomplete tear of left rotator cuff    Anxiety disorder    Hyperlipidemia LDL goal <130    History of breast cancer    Stone in renal pelvis    Multiple kidney stones    Lumbar disc herniation    Scoliosis of lumbar region due to degenerative disease of spine in adult    Sedative, hypnotic or anxiolytic dependence, uncomplicated (HCC)    Age-related osteoporosis without current  pathological fracture    Gammopathy    Primary insomnia     Allergies:  She is allergic to morphine.    Current Medications:  Outpatient Medications Marked as Taking for the 25 encounter (Office Visit) with Francesca Gamez MD   Medication Sig    cyclobenzaprine 10 MG Oral Tab Take 1 tablet (10 mg total) by mouth nightly as needed.    zolpidem 5 MG Oral Tab Take 1 tablet (5 mg total) by mouth nightly as needed for Sleep.    clonazePAM 0.5 MG Oral Tab Take 1 tablet (0.5 mg total) by mouth 2 (two) times daily as needed for Anxiety.    atorvastatin 20 MG Oral Tab Take 1 tablet (20 mg total) by mouth daily.    Multiple Vitamins-Minerals (WOMENS 50+ MULTI VITAMIN/MIN) Oral Tab Take by mouth.       Medical History:  She  has a past medical history of Anxiety, Breast CA (HCC), Calculus of kidney, Cancer (HCC) (2018), Colitis, Colon polyps, Depression, High cholesterol, Incontinence, Other general symptoms(780.99), and Visual impairment.  Surgical History:  She  has a past surgical history that includes colonoscopy (); ercp,diagnostic; cholecystectomy; fragmenting of kidney stone (Right, 2018); cystoscopy,insert ureteral stent; cysto/uretero w/lithotripsy (Right, 2019); lumpectomy right (2018); hysterectomy; and cataract.   Family History:  Her family history includes Breast Cancer in her paternal aunt and self; Cancer in her father; Heart Disorder in her brother and mother; Hypertension in her sister.  Social History:  She  reports that she quit smoking about 27 years ago. Her smoking use included cigarettes. She has never used smokeless tobacco. She reports current alcohol use of about 1.0 standard drink of alcohol per week. She reports that she does not use drugs.    Tobacco:  She smoked tobacco in the past but quit greater than 12 months ago.  Social History     Tobacco Use   Smoking Status Former    Current packs/day: 0.00    Types: Cigarettes    Quit date:     Years since quittin.    Smokeless Tobacco Never          CAGE Alcohol Screen:   CAGE screening score of 0 on 2/27/2025, showing low risk of alcohol abuse.      Patient Care Team:  Francesca Gamez MD as PCP - General (Internal Medicine)    Review of Systems  GENERAL: feels well otherwise  SKIN: denies any unusual skin lesions  EYES: denies blurred vision or double vision  HEENT: denies nasal congestion, sinus pain or ST  LUNGS: denies shortness of breath with exertion  CARDIOVASCULAR: denies chest pain on exertion  GI: denies abdominal pain, denies heartburn  : denies dysuria, vaginal discharge or itching, no complaint of urinary incontinence   MUSCULOSKELETAL:+ back pain  NEURO: denies headaches  PSYCHE: +  depression or anxiety  HEMATOLOGIC: denies hx of anemia  ENDOCRINE: denies thyroid history  ALL/ASTHMA: denies hx of allergy or asthma    Objective:   Physical Exam  GENERAL: well developed, well nourished,in no apparent distress  SKIN: no rashes,no suspicious lesions  HEENT: atraumatic, normocephalic,ears and throat are clear, no frontal or maxillary sinus tenderness, pupils equal reactive to light bilaterally, extraocular muscles intact  NECK: supple,no adenopathy, nontender   LUNGS: clear to auscultation, no wheeze  CARDIO: RRR without murmur  GI: good BS's,no masses or tenderness  EXTREMITIES: no cyanosis, or edema      Ht 5' 3\" (1.6 m)   Wt 121 lb 6.4 oz (55.1 kg)   BMI 21.51 kg/m²  Estimated body mass index is 21.51 kg/m² as calculated from the following:    Height as of this encounter: 5' 3\" (1.6 m).    Weight as of this encounter: 121 lb 6.4 oz (55.1 kg).    Medicare Hearing Assessment:   Hearing Screening    Screening Method: Questionnaire  I have a problem hearing over the telephone: No I have trouble following the conversations when two or more people are talking at the same time: No   I have trouble understanding things on the TV: No I have to strain to understand conversations: No   I have to worry about missing the  telephone ring or doorbell: No I have trouble hearing conversations in a noisy background such as a crowded room or restaurant: No   I get confused about where sounds come from: No I misunderstand some words in a sentence and need to ask people to repeat themselves: No   I especially have trouble understanding the speech of women and children: No I have trouble understanding the speaker in a large room such as at a meeting or place of [place]ars[place]: No   Many people I talk to seem to mumble (or don't speak clearly): Yes People get annoyed because I misunderstand what they say: No   I misunderstand what others are saying and make inappropriate responses: No I avoid social activities because I cannot hear well and fear I will reply improperly: No   Family members and friends have told me they think I may have hearing loss: No                   Assessment & Plan:   Sona Yan is a 84 year old female who presents for a Medicare Assessment.     1. Encounter for annual health examination (Primary)  Advised patient to watch what she eats and exercise as tolerated , seatbelt use no texting and driving, sunscreen use advised   2. Right hip pain  -     Cyclobenzaprine HCl; Take 1 tablet (10 mg total) by mouth nightly as needed.  Dispense: 30 tablet; Refill: 3  Will give flexeril as this has been helping her   3. Sciatica of right side  -     Cyclobenzaprine HCl; Take 1 tablet (10 mg total) by mouth nightly as needed.  Dispense: 30 tablet; Refill: 3  Will give flexeril as this has been helping her   4. Scoliosis of lumbar region due to degenerative disease of spine in adult  Stable   5. Lumbar disc herniation  Stable   6. Primary insomnia  Stable on meds   7. Anxiety disorder, unspecified type  Stable on meds   8. Sedative, hypnotic or anxiolytic dependence, uncomplicated (HCC)  Pt aware of the addictive properties of the meds she is taking    9. Incomplete tear of left rotator cuff, unspecified whether traumatic  Stable   10.  Stone in renal pelvis  Stable   11. Multiple kidney stones  Stable   12. History of breast cancer  Stable   13. Gammopathy  Overview:  IgM kappa and IgG kappa gammopathy's - being worked up -no evidence of endorgan dysfunction-Dr. Horne has ordered repeat blood work   Stable   14. Hyperlipidemia LDL goal <130  Advised patient to follow low-fat low-cholesterol diet and continue medications, monitor   15. Age-related osteoporosis without current pathological fracture  Overview:  Could not tolerate fosamax   Taking ca and vit d         Preventative medicine  Mammogram 10/2024  -dr landry and now dr klein   DEXA scan done 2/2021 and 11/2024 -- OP   Labs -12/2024 and 10/2024   Shingles shot -advises to asked the pharmacist  Pneumonia shot - declined     The patient indicates understanding of these issues and agrees to the plan.  Reinforced healthy diet, lifestyle, and exercise.      Return in 3 months (on 5/27/2025).     Francesca Gamez MD, 2/27/2025     Supplementary Documentation:   General Health:  In the past six months, have you lost more than 10 pounds without trying?: 2 - No  Has your appetite been poor?: No  Type of Diet: Balanced  How does the patient maintain a good energy level?: Appropriate Exercise  How would you describe your daily physical activity?: Moderate  How would you describe your current health state?: Good  How do you maintain positive mental well-being?: Puzzles;Games;Visiting Friends  On a scale of 0 to 10, with 0 being no pain and 10 being severe pain, what is your pain level?: 6 - (Moderate)  In the past six months, have you experienced urine leakage?: 0-No  At any time do you feel concerned for the safety/well-being of yourself and/or your children, in your home or elsewhere?: No  Have you had any immunizations at another office such as Influenza, Hepatitis B, Tetanus, or Pneumococcal?: No    Health Maintenance   Topic Date Due    Pneumococcal Vaccine: 50+ Years (2 of 2 - PPSV23) 01/09/2017     COVID-19 Vaccine (4 - 2024-25 season) 09/01/2024    Annual Well Visit  01/01/2025    Zoster Vaccines (1 of 2) 06/27/2025 (Originally 6/1/1959)    Influenza Vaccine (1) 06/30/2025 (Originally 10/1/2024)    Annual Depression Screening  Completed    Fall Risk Screening (Annual)  Completed    Meningococcal B Vaccine  Aged Out

## 2025-03-01 PROBLEM — I70.0 ATHEROSCLEROSIS OF AORTA: Status: RESOLVED | Noted: 2022-03-17 | Resolved: 2025-03-01

## 2025-03-01 PROBLEM — D69.2 SENILE PURPURA: Status: RESOLVED | Noted: 2022-03-20 | Resolved: 2025-03-01

## 2025-03-01 PROBLEM — Z90.49 S/P LAPAROSCOPIC CHOLECYSTECTOMY: Status: RESOLVED | Noted: 2017-07-02 | Resolved: 2025-03-01

## 2025-03-01 PROBLEM — L98.9 SKIN ABNORMALITY: Status: RESOLVED | Noted: 2024-04-17 | Resolved: 2025-03-01

## 2025-03-22 DIAGNOSIS — F51.01 PRIMARY INSOMNIA: ICD-10-CM

## 2025-03-22 DIAGNOSIS — M54.31 SCIATICA OF RIGHT SIDE: ICD-10-CM

## 2025-03-22 DIAGNOSIS — M25.551 RIGHT HIP PAIN: ICD-10-CM

## 2025-03-27 RX ORDER — CYCLOBENZAPRINE HCL 10 MG
10 TABLET ORAL NIGHTLY PRN
Qty: 30 TABLET | Refills: 3 | Status: SHIPPED | OUTPATIENT
Start: 2025-03-27

## 2025-03-27 NOTE — TELEPHONE ENCOUNTER
Patient checking on refill request. She is requesting cyclobenzaprine and zolpidem. She states it was ordered in February but insurance wouldn't cover it. However she spoke to [facility] today and they told her there would no charge as long as prescription was sent. Cyclobenzaprine sent as written order to [facility]. Zolpidem pending.

## 2025-03-27 NOTE — TELEPHONE ENCOUNTER
Please review.  Protocol Failed.    Zolpidem 5mg Recent fills each # 30 : 12/23, 1/27, 2/25  Last prescription written: 2/25/25  Last office visit:  2/27/2025    Requested Prescriptions   Pending Prescriptions Disp Refills    ZOLPIDEM 5 MG Oral Tab [Pharmacy Med Name: Zolpidem Tartrate 5 Mg Tab Nort] 30 tablet 0     Sig: Take 1 tablet (5 mg total) by mouth nightly as needed for Sleep.       Controlled Substance Medication Failed - 3/27/2025 11:51 AM        Failed - This medication is a controlled substance - forward to provider to refill        Passed - Medication is active on med list

## 2025-03-28 RX ORDER — ZOLPIDEM TARTRATE 5 MG/1
5 TABLET ORAL NIGHTLY PRN
Qty: 30 TABLET | Refills: 0 | Status: SHIPPED | OUTPATIENT
Start: 2025-03-28

## 2025-04-20 DIAGNOSIS — F51.01 PRIMARY INSOMNIA: ICD-10-CM

## 2025-04-23 RX ORDER — ZOLPIDEM TARTRATE 5 MG/1
5 TABLET ORAL NIGHTLY PRN
Qty: 30 TABLET | Refills: 0 | Status: SHIPPED | OUTPATIENT
Start: 2025-04-23

## 2025-04-23 NOTE — TELEPHONE ENCOUNTER
Please review; protocol failed/No Protocol      Recent Fills: 01/27/2025, 02/25/2025, 03/28/2025 #30 for 30 day supply     Last Rx Written: 03/28/2025    Last Office Visit: 02/27/2025

## 2025-04-25 ENCOUNTER — TELEPHONE (OUTPATIENT)
Dept: INTERNAL MEDICINE CLINIC | Facility: CLINIC | Age: 85
End: 2025-04-25

## 2025-04-25 NOTE — TELEPHONE ENCOUNTER
The patient is requesting a refill on the Cyclobenzaprine.  I stated that on 3/27/25 30 tablets and 3 refills were sent to the pharmacy.  I stated to call [facility] for the refill.  She stated her bottle stated she has refills available.  She will call [facility].

## 2025-05-01 NOTE — PROGRESS NOTES
Breast Surgery Surveillance Visit    Diagnosis: Right breast cancer status post lumpectomy on August 13, 2018.     Stage: Cancer Staging  Malignant neoplasm of right breast in female, estrogen receptor positive ([facility] [address]  Staging form: Breast, AJCC 8th Edition • Cancer [facility]) 08/2018    right breast Ca   • Colitis     completed antibiotic therapy for C. Diff colitis   • Colon polyps    • Depression    • High cholesterol    • Incontinence    • Other general symptoms(780.99)     torn rotator cuff left arm   • Visu Smokeless tobacco: Never Used   The patient is . She has 2 children. She is employed part-time at [employer] as a .     Review of Systems:  General:   The patient denies, fever, chills, night sweats, fatigue, generalized weakness, change in ap joint pain, stiff joints, neck pain, back pain or bone pain.     Neuropsychiatric:  There is no history of migraines or severe headaches, seizure/epilepsy, speech problems, coordination problems, trembling/tremors, fainting/black outs, dizziness, memory pro with no signs of new or recurrent disease. I personally reviewed her recent imaging which shows her to be healing well since surgery with no new concerns. She is tolerating her endocrine therapy with no significant side effects.   I anticipate she will be [de-identified] : Examination of the left foot and ankle is as follows: Inspection: swelling, ecchymosis of foot and ankle, moderate swelling of dorsal foot and lateral ankle, but no abrasion, laceration, no erythema Palpation: lateral malleolus tenderness, lateral ligament tenderness ROM: plantarflexion 20 degrees, inversion 15 degrees, eversion 10 degrees, dorsiflexion 10 degrees Strength: dorsiflexion 4/5, plantar flexion 4/5, inversion 4/5, eversion 4/5, EHL 5/5, FHL 5/5 Vascular: dorsalis pedis pulse: 2+, posterior tibialis pulse: 2+ Neuro: Sensation present to light touch in all distributions Gait: antalgic, LLE short leg splint, patient use axillary crutches for ambulation   X-rays of the left ankle is as follows: Ankle 3 view AP/Lateral/Oblique: nondisplaced, lateral malleolus fracture below the level of the syndesmosis, mortise intact without medial clear space widening

## 2025-05-04 DIAGNOSIS — F41.9 ANXIETY DISORDER, UNSPECIFIED TYPE: ICD-10-CM

## 2025-05-05 NOTE — TELEPHONE ENCOUNTER
11/14/2024  LAST WRITTEN: 11/14/2024  Quantity: 180    Recent Visits  Date Type Provider Dept   02/27/25 Office Visit Francesca Gamez MD [facility]

## 2025-05-06 RX ORDER — CLONAZEPAM 0.5 MG/1
0.5 TABLET ORAL 2 TIMES DAILY PRN
Qty: 180 TABLET | Refills: 0 | Status: SHIPPED | OUTPATIENT
Start: 2025-05-06

## 2025-05-23 DIAGNOSIS — F51.01 PRIMARY INSOMNIA: ICD-10-CM

## 2025-05-24 RX ORDER — ZOLPIDEM TARTRATE 5 MG/1
5 TABLET ORAL NIGHTLY PRN
Qty: 30 TABLET | Refills: 0 | Status: SHIPPED | OUTPATIENT
Start: 2025-05-24

## 2025-06-22 DIAGNOSIS — F51.01 PRIMARY INSOMNIA: ICD-10-CM

## 2025-06-23 RX ORDER — ZOLPIDEM TARTRATE 5 MG/1
5 TABLET ORAL NIGHTLY PRN
Qty: 30 TABLET | Refills: 0 | Status: SHIPPED | OUTPATIENT
Start: 2025-06-23

## 2025-06-23 NOTE — TELEPHONE ENCOUNTER
Please review; protocol failed/ has no protocol      Recent fills: 05/25/2025,04/26/2025,03/28/2025  Last Rx written: 05/24/2025  Last Office Visit: 02/27/2025    Recent Visits  Date Type Provider Dept   02/27/25 Office Visit Francesca Gamez MD [facility]

## 2025-06-29 ENCOUNTER — HOSPITAL ENCOUNTER (OUTPATIENT)
Age: 85
Discharge: HOME | End: 2025-06-29
Payer: MEDICARE

## 2025-06-29 VITALS
OXYGEN SATURATION: 99 % | DIASTOLIC BLOOD PRESSURE: 76 MMHG | HEART RATE: 87 BPM | SYSTOLIC BLOOD PRESSURE: 158 MMHG | RESPIRATION RATE: 20 BRPM | TEMPERATURE: 98 F

## 2025-06-29 DIAGNOSIS — H61.21 IMPACTED CERUMEN OF RIGHT EAR: Primary | ICD-10-CM

## 2025-06-29 DIAGNOSIS — H92.01 DISCOMFORT OF RIGHT EAR: ICD-10-CM

## 2025-06-29 NOTE — ED PROVIDER NOTES
Patient Seen in: [facility]        History  Chief Complaint   Patient presents with    Ear Problem Pain     Stated Complaint: ear pain    Subjective:   HPI            This is an 85-year-old female with a history of anxiety high cholesterol depression cancer presenting with ear pain.  Patient states she is having some right ear discomfort denies any recent injury or trauma but does occasionally use Q-tips no recent flights no swimming.  No outer ear swelling redness or warmth.  No recent cough cold symptoms      Objective:     No pertinent past medical history.            No pertinent past surgical history.              No pertinent social history.            Review of Systems    Positive for stated complaint: ear pain  Other systems are as noted in HPI.  Constitutional and vital signs reviewed.      All other systems reviewed and negative except as noted above.                  Physical Exam    ED Triage Vitals [06/29/25 0908]   /76   Pulse 87   Resp 20   Temp 98.1 °F (36.7 °C)   Temp src Oral   SpO2 99 %   O2 Device None (Room air)       Current Vitals:   Vital Signs  BP: 158/76  Pulse: 87  Resp: 20  Temp: 98.1 °F (36.7 °C)  Temp src: Oral    Oxygen Therapy  SpO2: 99 %  O2 Device: None (Room air)            Physical Exam  Vitals and nursing note reviewed.   Constitutional:       Appearance: Normal appearance.   HENT:      Right Ear: There is impacted cerumen. No mastoid tenderness.      Left Ear: Tympanic membrane normal.      Nose: Nose normal.      Mouth/Throat:      Mouth: Mucous membranes are moist.      Pharynx: Oropharynx is clear.   Eyes:      Conjunctiva/sclera: Conjunctivae normal.   Musculoskeletal:         General: Normal range of motion.      Cervical back: Normal range of motion. No rigidity or tenderness.   Lymphadenopathy:      Cervical: No cervical adenopathy.   Skin:     General: Skin is warm.      Capillary Refill: Capillary refill takes less than 2 seconds.   Neurological:       General: No focal deficit present.      Mental Status: She is alert and oriented to person, place, and time.                 ED Course  Labs Reviewed - No data to display               MDM          Medical Decision Making  85-year-old female well-appearing and nontoxic presenting with ear pain.  DDx OM versus OE versus otalgia versus cerumen impaction versus mastoiditis physical exam is not consistent with mastoiditis no clinical indication for labs or imaging but she is found to have a cerumen impaction Debrox will be placed in the ear and will irrigate the right ear and reevaluate.  Patient is agreeable with this plan of care and gives verbal consent for procedure.    Procedure: Right ear Debrox placed in the ear for 10 minutes ear irrigated by the tech on reevaluation cerumen still blocking the canal with this provider did additional irrigation and using alligator forceps was able to remove remaining cerumen in the ear the TM is not erythematous nonbulging the canal was clear no mastoid TTP patient with no complaint of pain after irrigation.    Discussed with the patient avoiding Q-tips discussed warm water in the ear and over-the-counter Debrox to help with earwax buildup provided a resource for ears nose and throat specialist to follow-up with if needed all education instructions placed in discharge paperwork.  Patient acknowledges understanding discharge instructions.    Problems Addressed:  Discomfort of right ear: acute illness or injury  Impacted cerumen of right ear: acute illness or injury    Risk  OTC drugs.        Disposition and Plan     Clinical Impression:  1. Impacted cerumen of right ear    2. Discomfort of right ear         Disposition:  Discharge  6/29/2025  9:50 am    Follow-up:  Francesca Gamez MD  [address] [place] [address] [id number] [phone]      As needed    Yvonne Gabriel MD  [address] [id number] [phone]      If symptoms worsen resource for ears nose and throat  specialist          Medications Prescribed:  Current Discharge Medication List                Supplementary Documentation:

## 2025-06-29 NOTE — DISCHARGE INSTRUCTIONS
Avoid using Q-tips as this pushes wax further in the ear you may gently wash the ear with a warm washcloth you may allow water to run in the ear when you shower or wash your hair to help with earwax buildup if you develop ear pain outer ear swelling redness or warmth fever headache nausea or vomiting or any new or worsening symptoms go to the nearest emergency department otherwise if you continue to have earwax buildup or some ear discomfort you may follow-up with ears nose and throat specialist.

## 2025-07-11 NOTE — TELEPHONE ENCOUNTER
Phoned patient to discuss f/u appointment previously scheduled with Dr. Ga for October.  Patient shares she was notified of Dr. Ga no longer being with [email or web address].  Acknowledged patient's feedback and concern.  Discussed scheduling with either Dr. Grigsby or Dr. Ambrocio.  Patient agreeable to meet with Dr. Grigsby.  Coordinated appointments.  All information provided to patient.  
normal...

## 2025-07-22 DIAGNOSIS — F51.01 PRIMARY INSOMNIA: ICD-10-CM

## 2025-07-24 RX ORDER — ZOLPIDEM TARTRATE 5 MG/1
5 TABLET ORAL NIGHTLY PRN
Qty: 30 TABLET | Refills: 0 | Status: SHIPPED | OUTPATIENT
Start: 2025-07-24

## 2025-07-24 NOTE — TELEPHONE ENCOUNTER
Please review.  Protocol failed/has no protocol.    Recent fills each # 30 : 06/23/2025,05/25/2025  Last prescription written: 06/23/2025  Last office visit:  2/27/2025

## 2025-08-24 DIAGNOSIS — F51.01 PRIMARY INSOMNIA: ICD-10-CM

## 2025-08-27 RX ORDER — ZOLPIDEM TARTRATE 5 MG/1
5 TABLET ORAL NIGHTLY PRN
Qty: 30 TABLET | Refills: 0 | Status: SHIPPED | OUTPATIENT
Start: 2025-08-27

## (undated) DEVICE — RETRIEVAL BALLOON CATHETER: Brand: EXTRACTOR™ PRO RX

## (undated) DEVICE — GAMMEX® PI HYBRID SIZE 7.5, STERILE POWDER-FREE SURGICAL GLOVE, POLYISOPRENE AND NEOPRENE BLEND: Brand: GAMMEX

## (undated) DEVICE — SNARE CAPTIFLEX MICRO-OVL OLY

## (undated) DEVICE — DECANTER SPIKE TRANSFLOW

## (undated) DEVICE — STERILE LATEX POWDER-FREE SURGICAL GLOVESWITH NITRILE COATING: Brand: PROTEXIS

## (undated) DEVICE — TISSUE RETRIEVAL SYSTEM: Brand: INZII RETRIEVAL SYSTEM

## (undated) DEVICE — SOL  .9 1000ML BTL

## (undated) DEVICE — MEDI-VAC NON-CONDUCTIVE SUCTION TUBING: Brand: CARDINAL HEALTH

## (undated) DEVICE — CAUTERY BLADE 2IN INS E1455

## (undated) DEVICE — CLIP MED INTNL HMCLP TNTLM

## (undated) DEVICE — Device: Brand: DEFENDO AIR/WATER/SUCTION AND BIOPSY VALVE

## (undated) DEVICE — SOLO HYBRID WIRE 35 FLEX STR

## (undated) DEVICE — SOL  .9 3000ML

## (undated) DEVICE — SUTURE SILK 2-0 685H

## (undated) DEVICE — VEST SRG 3 MED CUP R/L

## (undated) DEVICE — STANDARD HYPODERMIC NEEDLE,POLYPROPYLENE HUB: Brand: MONOJECT

## (undated) DEVICE — FLEXIBLE YANKAUER,MEDIUM TIP, NO VACUUM CONTROL: Brand: ARGYLE

## (undated) DEVICE — ISOVUE 300 10X100ML VIAL

## (undated) DEVICE — 200 HOLMIUM FIBER-REUSABLE

## (undated) DEVICE — OPEN-END URETERAL CATHETER SOF-FLEX: Brand: SOF-FLEX

## (undated) DEVICE — LOCKING DEVICE RX & BIOPSY CAP

## (undated) DEVICE — DUAL LUMEN CATHETER

## (undated) DEVICE — SUTURE MONOCRYL 4-0 PS-2

## (undated) DEVICE — CLIP SM INTNL HMCLP TNTLM ESCP

## (undated) DEVICE — GOWN SURG AERO BLUE PERF LG

## (undated) DEVICE — LAP CHOLE: Brand: MEDLINE INDUSTRIES, INC.

## (undated) DEVICE — TIGERTAIL 5F FLXTIP 70CM

## (undated) DEVICE — SUTURE VICRYL 0 UR-6

## (undated) DEVICE — ABDOMINAL PAD: Brand: CURITY

## (undated) DEVICE — MINOR GENERAL: Brand: MEDLINE INDUSTRIES, INC.

## (undated) DEVICE — CASED DISP BIPOLAR CORD

## (undated) DEVICE — SOL H2O 1000ML BTL

## (undated) DEVICE — CYSTO PACK: Brand: MEDLINE INDUSTRIES, INC.

## (undated) DEVICE — TROCARS: Brand: KII® BLUNT TIP ACCESS SYSTEM

## (undated) DEVICE — ADHESIVE MASTISOL 2/3CC VL

## (undated) DEVICE — ENDOSCOPIC VALVE WITH ADAPTER.: Brand: SURSEAL® II

## (undated) DEVICE — 3M™ STERI-STRIP™ REINFORCED ADHESIVE SKIN CLOSURES, R1547, 1/2 IN X 4 IN (12 MM X 100 MM), 6 STRIPS/ENVELOPE: Brand: 3M™ STERI-STRIP™

## (undated) DEVICE — 3 ML SYRINGE LUER-LOCK TIP: Brand: MONOJECT

## (undated) DEVICE — UNDYED BRAIDED (POLYGLACTIN 910), SYNTHETIC ABSORBABLE SUTURE: Brand: COATED VICRYL

## (undated) DEVICE — SPHINCTEROTOME: Brand: HYDRATOME RX 44

## (undated) DEVICE — DRAPE PACK CHEST & U BAR

## (undated) DEVICE — UROLOGY DRAIN BAG

## (undated) DEVICE — 9534HP TRANSPARENT DRSG W/FRAME: Brand: 3M™ TEGADERM™

## (undated) DEVICE — LIGACLIP 10-M/L, 10MM ENDOSCOPIC ROTATING MULTIPLE CLIP APPLIERS: Brand: LIGACLIP

## (undated) DEVICE — X-STREAM LAPAROSCOPIC IRRIGATION TUBING SET WITH NEZHAT-DORSEY TRUMPET VALVE, AND COJOINED SUCTION/IRRIGATION TUBING, WITHOUT PROBE TIP: Brand: X-STREAM

## (undated) DEVICE — ENCORE® LATEX ACCLAIM SIZE 7.5, STERILE LATEX POWDER-FREE SURGICAL GLOVE: Brand: ENCORE

## (undated) DEVICE — Device: Brand: JELCO

## (undated) DEVICE — TOWEL OR BLU 16X26 STRL

## (undated) DEVICE — ENDOSCOPY PACK UPPER: Brand: MEDLINE INDUSTRIES, INC.

## (undated) DEVICE — REM POLYHESIVE ADULT PATIENT RETURN ELECTRODE: Brand: VALLEYLAB

## (undated) DEVICE — SUTURE PDS II 3-0 Z683G

## (undated) DEVICE — 6 ML SYRINGE LUER-LOCK TIP: Brand: MONOJECT

## (undated) DEVICE — HYDRA JAGWIRE

## (undated) DEVICE — NITINOL STONE RETRIEVAL BASKET: Brand: ZERO TIP

## (undated) DEVICE — DRAPE TAPE: Brand: CONVERTORS

## (undated) DEVICE — NEEDLE HPO 18GA 1.5IN ECLPS

## (undated) DEVICE — TROCAR: Brand: KII® SLEEVE

## (undated) DEVICE — NITINOL WIRE ANGLED 035

## (undated) DEVICE — FLEXOR, URETERAL ACCESS SHEATH WITH AQ, HYDROPHILIC COATING: Brand: FLEXOR

## (undated) DEVICE — SUTURE VICRYL 3-0 SH

## (undated) DEVICE — [HIGH FLOW INSUFFLATOR,  DO NOT USE IF PACKAGE IS DAMAGED,  KEEP DRY,  KEEP AWAY FROM SUNLIGHT,  PROTECT FROM HEAT AND RADIOACTIVE SOURCES.]: Brand: PNEUMOSURE

## (undated) DEVICE — TROCAR: Brand: KII FIOS FIRST ENTRY

## (undated) NOTE — LETTER
[facility]  Administration of Anesthesia  1.    I Sona E Jignesh Deep, or _________________________________ acting on his/her behalf, (Patient) (Dependent/Representative) request to receive anesthesia for my pending procedure/operation/treatment pressure, difficulty urinating, slowing of the baby's heart rate and headache. Rare risks include infections, high spinal         block, spinal bleeding, seizure, cardiac arrest and death.   7.   AWARENESS: I understand that it is possible (but unlike ________________________________________________  (Date) (Time)                                                                                                  (Responsible person in case of minor/ unconscious pt) /Relationship    My signature below affir

## (undated) NOTE — ED AVS SNAPSHOT
Sona Peck   MRN: [id number]    Department:  [facility] Emergency Department   Date of Visit:  10/17/2018           Disclosure     Insurance plans vary and the physician(s) referred by the ER may not be covered by your plan.  Please contact within the next three months to obtain basic health screening including reassessment of your blood pressure.     IF THERE IS ANY CHANGE OR WORSENING OF YOUR CONDITION, CALL YOUR PRIMARY CARE PHYSICIAN AT ONCE OR RETURN IMMEDIATELY TO THE EMERGENCY DEPARTMEN

## (undated) NOTE — LETTER
[address] [place] [address]  Authorization for Surgical Operation or Procedure  Date: ______________       Time: _______________  1.  I hereby authorize Dr. Earline Lee , my physician and the assistant, to perform the following operation and/or p 5. I consent to the photographing of the operations or procedures to be performed for the purposes of advancing medicine, science, and/or education, provided my identity is not revealed.  If the procedure has been videotaped, the physician/surgeon will obta risks and benefits involved in the proposed treatment and any reasonable alternative to the proposed treatment. I have also explained the risks and benefits involved in the refusal of the proposed treatment and have answered the patient's questions.  If I h

## (undated) NOTE — ED AVS SNAPSHOT
Sona Pérez   MRN: [id number]    Department:  [facility] Emergency Department   Date of Visit:  10/18/2018           Disclosure     Insurance plans vary and the physician(s) referred by the ER may not be covered by your plan.  Please contact within the next three months to obtain basic health screening including reassessment of your blood pressure.     IF THERE IS ANY CHANGE OR WORSENING OF YOUR CONDITION, CALL YOUR PRIMARY CARE PHYSICIAN AT ONCE OR RETURN IMMEDIATELY TO THE EMERGENCY DEPARTMEN

## (undated) NOTE — LETTER
[facility], [place]  Authorization for Invasive Procedures  1.  I hereby authorize Dr. Paxton Coronado , my physician and whomever may be designated as the doctor's assistant, to perform the following operation and/or procedure:  Endoscopi 5. I consent to the photographing of the operations or procedures to be performed for the purposes of advancing medicine, science, and/or education, provided my identity is not revealed.  If the procedure has been videotaped, the physician/surgeon will obta __________ Time: ___________    Statement of Physician  My signature below affirms that prior to the time of the procedure, I have explained to the patient and/or her legal representative, the risks and benefits involved in the proposed treatment and any r

## (undated) NOTE — Clinical Note
Thank you for the consult. I saw Ms. Brock Roman in the endocrine/diabetes clinic today. Please see attached my note. Please feel free to contact me with any questions. Thanks!

## (undated) NOTE — LETTER
[address], [place], [place]     AUTHORIZATION FOR SURGICAL OPERATION OR PROCEDURE    I hereby authorize Dr. Don Jewell MD, my Physician(s) and whomever may be designated as the doctor's Assistant, to perform the f 4. I consent to the photographing of procedure(s) to be performed for the purposes of advancing medicine, science and/or education, provided my identity is not revealed.  If the procedure has been videotaped, the physician/surgeon will obtain the original v (Witness signature)                                                                                                  (Date)                                (Time)  STATEMENT OF PHYSICIAN My signature below affirms that prior to the time of the procedure;  I

## (undated) NOTE — IP AVS SNAPSHOT
[address]  [address] [facility] [phone]                Discharge Summary   5/17/2017    [facility] [facility]           Admission Information        Provider Department    5/17/2017 Kedar Maloney MD Em No immunizations on file.       Recent Hematology Lab Results  (Last 3 results in the past 90 days)    WBC RBC Hemoglobin Hematocrit MCV MCH MCHC RDW Platelet MPV    (01/58/84)  6.3 (05/21/17)  3.50 (L) (05/21/17)  10.1 (L) (05/21/17)  29.5 (L) (05/21/17) Medications Sent Home None to return    Medications Returned:           Additional Information       We are concerned for your overall well being:    - If you are a smoker or have smoked in the last 12 months, we encourage you to explore options for quitti prescribed to take and their potential SIDE EFFECTS. Your nurse will review your medications with you before you are discharged, and can provide you with additional printed information.  Not all patients will experience these side effects or respond to [facility]

## (undated) NOTE — ED AVS SNAPSHOT
Sona Maxwell   MRN: [id number]    Department:  [facility] Emergency Department   Date of Visit:  2/25/2020           Disclosure     Insurance plans vary and the physician(s) referred by the ER may not be covered by your plan.  Please contact y within the next three months to obtain basic health screening including reassessment of your blood pressure.     IF THERE IS ANY CHANGE OR WORSENING OF YOUR CONDITION, CALL YOUR PRIMARY CARE PHYSICIAN AT ONCE OR RETURN IMMEDIATELY TO THE EMERGENCY DEPARTMEN

## (undated) NOTE — LETTER
[address], [place], [place]     AUTHORIZATION FOR SURGICAL OPERATION OR PROCEDURE    I hereby authorize                                                            my Physician(s) and whomever may be designated as the Bridgette Stanford 4. I consent to the photographing of procedure(s) to be performed for the purposes of advancing medicine, science and/or education, provided my identity is not revealed.  If the procedure has been videotaped, the physician/surgeon will obtain the original v (Witness signature)                                                                                                  (Date)                                (Time)  STATEMENT OF PHYSICIAN My signature below affirms that prior to the time of the procedure;  I

## (undated) NOTE — LETTER
AUTHORIZATION FOR SURGICAL OPERATION OR OTHER PROCEDURE    1. I hereby authorize Dr. Chely Waldron and the [facility] staff assigned to my case to perform the following operation and/or procedure at the [facility]:    Right gluteus medius trigger point injection with corticosteroid     2. My physician has explained the nature and purpose of the operation or other procedure, possible alternative methods of treatment, the risks involved, and the possibility of complication to me. I acknowledge that no guarantee has been made as to the result that may be obtained. 3.  I recognize that, during the course of this operation, or other procedure, unforseen conditions may necessitate additional or different procedure than those listed above. I, therefore, further authorize and request that the above named physician, his/her physician assistants or designees perform such procedures as are, in his/her professional opinion, necessary and desirable. 4.  Any tissue or organs removed in the operation or other procedure may be disposed of by and at the discretion of the [facility] staff and [facility]. 5.  I understand that in the event of a medical emergency, I will be transported by local paramedics to [facility] or other [facility] emergency department. 6.  I certify that I have read and fully understand the above consent to operation and/or other procedure. 7.  I acknowledge that my physician has explained sedation/analgesia administration to me including the risks and benefits. I consent to the administration of sedation/analgesia as may be necessary or desirable in the judgement of my physician. Witness signature: ___________________________________________________ Date:  ______/______/_____                    Time:  ________ A. M.  P.M.        Patient Name:  Madie Dowling [id number] 6/1/1940              Patient signature: ___________________________________________________             Relationship to Patient:           []  Parent    Responsible person                          []  Spouse  In case of minor or                    [] Other  _____________   Incompetent name:  __________________________________________________                               (please print)      _____________      Responsible person  In case of minor or  Incompetent signature:  _______________________________________________    Statement of Physician  My signature below affirms that prior to the time of the procedure, I have explained to the patient and/or his/her guardian, the risks and benefits involved in the proposed treatment and any reasonable alternative to the proposed treatment. I have also explained the risks and benefits involved in the refusal of the proposed treatment and have answered the patient's questions.                         Date:  ______/______/_______  Provider                      Signature:  __________________________________________________________       Time:  ___________ A.M    P.M.